# Patient Record
Sex: FEMALE | Race: WHITE | Employment: UNEMPLOYED | URBAN - METROPOLITAN AREA
[De-identification: names, ages, dates, MRNs, and addresses within clinical notes are randomized per-mention and may not be internally consistent; named-entity substitution may affect disease eponyms.]

---

## 2017-01-05 ENCOUNTER — CLINICAL SUPPORT (OUTPATIENT)
Dept: ONCOLOGY | Age: 37
End: 2017-01-05

## 2017-01-05 ENCOUNTER — HOSPITAL ENCOUNTER (OUTPATIENT)
Dept: ONCOLOGY | Age: 37
Discharge: HOME | End: 2017-01-05

## 2017-01-05 ENCOUNTER — HOSPITAL ENCOUNTER (OUTPATIENT)
Dept: INFUSION THERAPY | Age: 37
Discharge: HOME | End: 2017-01-05
Payer: COMMERCIAL

## 2017-01-05 VITALS
SYSTOLIC BLOOD PRESSURE: 128 MMHG | RESPIRATION RATE: 16 BRPM | TEMPERATURE: 98.9 F | HEART RATE: 87 BPM | DIASTOLIC BLOOD PRESSURE: 85 MMHG

## 2017-01-05 DIAGNOSIS — D75.1 SECONDARY POLYCYTHEMIA: ICD-10-CM

## 2017-01-05 DIAGNOSIS — D75.1 SECONDARY POLYCYTHEMIA: Primary | ICD-10-CM

## 2017-01-05 LAB
BASO+EOS+MONOS # BLD AUTO: 0.6 K/UL (ref 0–2.3)
BASO+EOS+MONOS # BLD AUTO: 11 % (ref 0.1–17)
DIFFERENTIAL METHOD BLD: NORMAL
ERYTHROCYTE [DISTWIDTH] IN BLOOD BY AUTOMATED COUNT: 12.1 % (ref 11.5–14.5)
HCT VFR BLD AUTO: 45.6 % (ref 36–48)
HGB BLD-MCNC: 15.3 G/DL (ref 12–16)
LYMPHOCYTES # BLD AUTO: 35 % (ref 14–44)
LYMPHOCYTES # BLD: 2 K/UL (ref 1.1–5.9)
MCH RBC QN AUTO: 31.3 PG (ref 25–35)
MCHC RBC AUTO-ENTMCNC: 33.6 G/DL (ref 31–37)
MCV RBC AUTO: 93.3 FL (ref 78–102)
NEUTS SEG # BLD: 3.1 K/UL (ref 1.8–9.5)
NEUTS SEG NFR BLD AUTO: 55 % (ref 40–70)
PLATELET # BLD AUTO: 178 K/UL (ref 140–440)
RBC # BLD AUTO: 4.89 M/UL (ref 4.1–5.1)
WBC # BLD AUTO: 5.7 K/UL (ref 4.5–13)

## 2017-01-05 PROCEDURE — 74011250636 HC RX REV CODE- 250/636: Performed by: INTERNAL MEDICINE

## 2017-01-05 PROCEDURE — 99195 PHLEBOTOMY: CPT

## 2017-01-05 PROCEDURE — 36415 COLL VENOUS BLD VENIPUNCTURE: CPT

## 2017-01-05 RX ORDER — SODIUM CHLORIDE 9 MG/ML
500 INJECTION, SOLUTION INTRAVENOUS CONTINUOUS
Status: DISPENSED | OUTPATIENT
Start: 2017-01-05 | End: 2017-01-06

## 2017-01-05 RX ADMIN — SODIUM CHLORIDE 500 ML: 0.9 INJECTION, SOLUTION INTRAVENOUS at 14:52

## 2017-01-05 NOTE — PROGRESS NOTES
[address] [facility] Progress Note    Date: 2017    Name: Chirag Ruiz    MRN: [id number]         : 1980      Ms. Luis Arriaga was assessed and education was provided. Ms. Christina Gonzáles vitals were reviewed and patient was observed for 5 minutes prior to treatment. Visit Vitals    /84 (BP 1 Location: Left arm, BP Patient Position: At rest;Sitting)    Pulse 86    Temp 97 °F (36.1 °C)    Resp 16    Breastfeeding No       Lab results were obtained and reviewed. Recent Results (from the past 12 hour(s))   CBC WITH 3 PART DIFF    Collection Time: 17  2:12 PM   Result Value Ref Range    WBC 5.7 4.5 - 13.0 K/uL    RBC 4.89 4. 10 - 5.10 M/uL    HGB 15.3 12.0 - 16.0 g/dL    HCT 45.6 36 - 48 %    MCV 93.3 78 - 102 FL    MCH 31.3 25.0 - 35.0 PG    MCHC 33.6 31 - 37 g/dL    RDW 12.1 11.5 - 14.5 %    PLATELET 402 960 - 324 K/uL    NEUTROPHILS 55 40 - 70 %    MIXED CELLS 11 0.1 - 17 %    LYMPHOCYTES 35 14 - 44 %    ABS. NEUTROPHILS 3.1 1.8 - 9.5 K/UL    ABS. MIXED CELLS 0.6 0.0 - 2.3 K/uL    ABS. LYMPHOCYTES 2.0 1.1 - 5.9 K/UL    DF AUTOMATED         Therapeutic phlebotomy started at 1430 and ended at 1452 for HCT 45.6. Approx 500 ml blood removed from patient and 500 ml NS given IV. Patient armband removed and shredded. Ms. Luis Arriaga was discharged from [place] [address] in stable condition at 1530. She is to return on 17 at 1300 for her next appointment for CBC/phlebotomy (weekly status).     Maria Gabriel RN  2017

## 2017-01-12 ENCOUNTER — HOSPITAL ENCOUNTER (OUTPATIENT)
Dept: INFUSION THERAPY | Age: 37
Discharge: HOME | End: 2017-01-12
Payer: COMMERCIAL

## 2017-01-12 ENCOUNTER — CLINICAL SUPPORT (OUTPATIENT)
Dept: ONCOLOGY | Age: 37
End: 2017-01-12

## 2017-01-12 ENCOUNTER — HOSPITAL ENCOUNTER (OUTPATIENT)
Dept: ONCOLOGY | Age: 37
Discharge: HOME | End: 2017-01-12

## 2017-01-12 VITALS
HEART RATE: 86 BPM | TEMPERATURE: 97.9 F | SYSTOLIC BLOOD PRESSURE: 117 MMHG | RESPIRATION RATE: 16 BRPM | DIASTOLIC BLOOD PRESSURE: 79 MMHG

## 2017-01-12 DIAGNOSIS — D75.1 POLYCYTHEMIA, SECONDARY: Primary | ICD-10-CM

## 2017-01-12 DIAGNOSIS — D75.1 POLYCYTHEMIA, SECONDARY: ICD-10-CM

## 2017-01-12 LAB
BASOPHILS # BLD AUTO: 0.1 K/UL (ref 0–0.1)
BASOPHILS # BLD: 1 % (ref 0–2)
DIFFERENTIAL METHOD BLD: NORMAL
EOSINOPHIL # BLD: 0.4 K/UL (ref 0–0.4)
EOSINOPHIL NFR BLD: 5 % (ref 0–5)
ERYTHROCYTE [DISTWIDTH] IN BLOOD BY AUTOMATED COUNT: 12.6 % (ref 11.6–14.5)
HCT VFR BLD AUTO: 39.7 % (ref 35–45)
HGB BLD-MCNC: 13.4 G/DL (ref 12–16)
LYMPHOCYTES # BLD AUTO: 27 % (ref 21–52)
LYMPHOCYTES # BLD: 1.9 K/UL (ref 0.9–3.6)
MCH RBC QN AUTO: 31.8 PG (ref 24–34)
MCHC RBC AUTO-ENTMCNC: 33.8 G/DL (ref 31–37)
MCV RBC AUTO: 94.3 FL (ref 74–97)
MONOCYTES # BLD: 0.5 K/UL (ref 0.05–1.2)
MONOCYTES NFR BLD AUTO: 7 % (ref 3–10)
NEUTS SEG # BLD: 4.1 K/UL (ref 1.8–8)
NEUTS SEG NFR BLD AUTO: 60 % (ref 40–73)
PLATELET # BLD AUTO: 199 K/UL (ref 135–420)
PMV BLD AUTO: 10.2 FL (ref 9.2–11.8)
RBC # BLD AUTO: 4.21 M/UL (ref 4.2–5.3)
WBC # BLD AUTO: 6.8 K/UL (ref 4.6–13.2)

## 2017-01-12 PROCEDURE — 36415 COLL VENOUS BLD VENIPUNCTURE: CPT

## 2017-01-12 NOTE — PROGRESS NOTES
[facility] Progress Note    Date: 2017    Name: Deon Frye    MRN: [id number]         : 1980      Ms. Srinath Gallardo was assessed and education was provided. Ms. Gui Avery vitals were reviewed and patient was observed for 5 minutes prior to treatment. Visit Vitals    /79 (BP 1 Location: Right arm, BP Patient Position: At rest;Sitting)    Pulse 86    Temp 97.9 °F (36.6 °C)    Resp 16       Lab results were obtained and reviewed. Recent Results (from the past 12 hour(s))   CBC WITH AUTOMATED DIFF    Collection Time: 17  1:21 PM   Result Value Ref Range    WBC 6.8 4.6 - 13.2 K/uL    RBC 4.21 4.20 - 5.30 M/uL    HGB 13.4 12.0 - 16.0 g/dL    HCT 39.7 35.0 - 45.0 %    MCV 94.3 74.0 - 97.0 FL    MCH 31.8 24.0 - 34.0 PG    MCHC 33.8 31.0 - 37.0 g/dL    RDW 12.6 11.6 - 14.5 %    PLATELET 217 717 - 095 K/uL    MPV 10.2 9.2 - 11.8 FL    NEUTROPHILS 60 40 - 73 %    LYMPHOCYTES 27 21 - 52 %    MONOCYTES 7 3 - 10 %    EOSINOPHILS 5 0 - 5 %    BASOPHILS 1 0 - 2 %    ABS. NEUTROPHILS 4.1 1.8 - 8.0 K/UL    ABS. LYMPHOCYTES 1.9 0.9 - 3.6 K/UL    ABS. MONOCYTES 0.5 0.05 - 1.2 K/UL    ABS. EOSINOPHILS 0.4 0.0 - 0.4 K/UL    ABS. BASOPHILS 0.1 0.0 - 0.1 K/UL    DF AUTOMATED         Therapeutic phlebotomyheld for HCT 39.7. Patient armband removed and shredded. Ms. Srinath Gallardo was discharged from [place] [address] in stable condition at 1402. She is to return on 17 at 1300 for her next appointment for CBC/phlebotomy (monthly status).     Dangelo Llanos RN  2017  3:16 PM

## 2017-02-01 ENCOUNTER — OFFICE VISIT (OUTPATIENT)
Dept: ORTHOPEDIC SURGERY | Facility: CLINIC | Age: 37
End: 2017-02-01

## 2017-02-01 VITALS
DIASTOLIC BLOOD PRESSURE: 83 MMHG | SYSTOLIC BLOOD PRESSURE: 129 MMHG | HEART RATE: 93 BPM | BODY MASS INDEX: 23.49 KG/M2 | TEMPERATURE: 97.4 F | WEIGHT: 155 LBS | HEIGHT: 68 IN

## 2017-02-01 DIAGNOSIS — M65.4 DE QUERVAIN'S DISEASE (TENOSYNOVITIS): ICD-10-CM

## 2017-02-01 DIAGNOSIS — M79.641 RIGHT HAND PAIN: Primary | ICD-10-CM

## 2017-02-01 RX ORDER — PREDNISONE 10 MG/1
TABLET ORAL
Qty: 20 TAB | Refills: 0 | Status: SHIPPED | OUTPATIENT
Start: 2017-02-01 | End: 2017-06-13 | Stop reason: ALTCHOICE

## 2017-02-01 NOTE — PROGRESS NOTES
Patient: Divya Alvarez                MRN: [id number]       SSN: [id number]  YOB: 1980        AGE: 43 y.o. SEX: female  There is no height or weight on file to calculate BMI. PCP: Micaela Ellis DO  02/01/17      No chief complaint on file. HISTORY OF PRESENT ILLNESS: Divya Alvarez is a 43 y.o. female who presents to the office for bilateral hand pain greater in the right. She works typing all day long. She denies any numbness or paresthesia but she is right hand dominant. Her main concern came from whether or not she had carpal tunnel. She has tried Motrin with no relief. Review of Systems   Constitutional: Negative. HENT: Negative. Eyes: Negative. Respiratory: Negative. Cardiovascular: Negative. Gastrointestinal: Negative. Genitourinary: Negative. Musculoskeletal: Positive for myalgias (bilateral hand). Skin: Negative. Neurological: Negative. Endo/Heme/Allergies: Negative. Psychiatric/Behavioral: Negative. Social History     Social History    Marital status:      Spouse name: N/A    Number of children: N/A    Years of education: N/A     Occupational History          Social History Main Topics    Smoking status: Current Every Day Smoker     Packs/day: 0.50     Years: 15.00     Types: Cigarettes    Smokeless tobacco: Never Used      Comment: 1998    Alcohol use 0.0 oz/week     1 - 4 Glasses of wine per week      Comment: occ    Drug use: No    Sexual activity: Yes     Partners: Male     Other Topics Concern    Not on file     Social History Narrative        Past Medical History   Diagnosis Date    Discoid lupus      skin lupus    Hypertension      during pregnancy/6 months after    Menorrhagia      hysterectomy    Polycythemia (HCC)         No Known Allergies      Current Outpatient Prescriptions   Medication Sig    montelukast (SINGULAIR) 10 mg tablet Take 10 mg by mouth daily.     ALPRAZolam (XANAX) 0.25 mg tablet Take 1 Tab by mouth daily as needed for Anxiety.  escitalopram oxalate (LEXAPRO) 10 mg tablet Take 1 Tab by mouth daily.  hyoscyamine sulfate (CYSTOSPAZ) 0.125 mg tablet 0.125 mg every four (4) hours as needed.  naproxen (NAPROSYN) 500 mg tablet Take 1 Tab by mouth two (2) times daily (with meals).  fexofenadine (ALLEGRA) 180 mg tablet Take 1 Tab by mouth daily. No current facility-administered medications for this visit. Physical Exam  Constitutional: she is oriented to person, place, and time and well-developed, well-nourished, and in no distress. No distress. HENT:   Head: Normocephalic and atraumatic. Right Ear: Hearing normal.   Left Ear: Hearing normal.   Nose: Nose normal.   Eyes: Conjunctivae, EOM and lids are normal. Pupils are equal, round, and reactive to light. Neck: Trachea normal.   Pulmonary/Chest: Effort normal and breath sounds normal. No respiratory distress. Abdominal: Soft. Neurological: she is alert and oriented to person, place, and time. Skin: Skin is warm, dry and intact. she is not diaphoretic. Psychiatric: Affect normal.   Nursing note and vitals reviewed. Ortho Exam   Majority of her pain was along the course of the thumb extensor tendons. She had a positive finkelstein's test. She had no TTP over the MCP joint of the thumb. There was no swelling or skin discoloration at any part of the left or right hands.  strength was normal and pain free of both hands. Sensation and circulation were normal. There was a negative Tinel sign when tapping over the right wrist.     RADIOGRAPHS: X-rays show 3 views show no arthritis or bony abnormalities. 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 She was fitted with a splint and give na modified medrol satnam and Aleve 2 times a day and apply moist heat 2 times daily. She will call us with an update on her response.        Scribed by [facility] ([facility]) as dictated by Giles Acosta MD.

## 2017-02-09 ENCOUNTER — HOSPITAL ENCOUNTER (OUTPATIENT)
Dept: ONCOLOGY | Age: 37
Discharge: HOME | End: 2017-02-09

## 2017-02-09 ENCOUNTER — CLINICAL SUPPORT (OUTPATIENT)
Dept: ONCOLOGY | Age: 37
End: 2017-02-09

## 2017-02-09 ENCOUNTER — HOSPITAL ENCOUNTER (OUTPATIENT)
Dept: INFUSION THERAPY | Age: 37
Discharge: HOME | End: 2017-02-09
Payer: COMMERCIAL

## 2017-02-09 VITALS
TEMPERATURE: 96.6 F | RESPIRATION RATE: 16 BRPM | HEART RATE: 86 BPM | SYSTOLIC BLOOD PRESSURE: 120 MMHG | DIASTOLIC BLOOD PRESSURE: 80 MMHG

## 2017-02-09 DIAGNOSIS — D75.1 SECONDARY POLYCYTHEMIA: Primary | ICD-10-CM

## 2017-02-09 DIAGNOSIS — D75.1 SECONDARY POLYCYTHEMIA: ICD-10-CM

## 2017-02-09 LAB
BASO+EOS+MONOS # BLD AUTO: 0.8 K/UL (ref 0–2.3)
BASO+EOS+MONOS # BLD AUTO: 13 % (ref 0.1–17)
DIFFERENTIAL METHOD BLD: NORMAL
ERYTHROCYTE [DISTWIDTH] IN BLOOD BY AUTOMATED COUNT: 11.9 % (ref 11.5–14.5)
HCT VFR BLD AUTO: 42.4 % (ref 36–48)
HGB BLD-MCNC: 14.3 G/DL (ref 12–16)
LYMPHOCYTES # BLD AUTO: 31 % (ref 14–44)
LYMPHOCYTES # BLD: 1.9 K/UL (ref 1.1–5.9)
MCH RBC QN AUTO: 31.2 PG (ref 25–35)
MCHC RBC AUTO-ENTMCNC: 33.7 G/DL (ref 31–37)
MCV RBC AUTO: 92.4 FL (ref 78–102)
NEUTS SEG # BLD: 3.6 K/UL (ref 1.8–9.5)
NEUTS SEG NFR BLD AUTO: 56 % (ref 40–70)
PLATELET # BLD AUTO: 204 K/UL (ref 140–440)
RBC # BLD AUTO: 4.59 M/UL (ref 4.1–5.1)
WBC # BLD AUTO: 6.3 K/UL (ref 4.5–13)

## 2017-02-09 PROCEDURE — 36415 COLL VENOUS BLD VENIPUNCTURE: CPT

## 2017-02-09 NOTE — PROGRESS NOTES
[facility] Progress Note    Date: 2017    Name: May Anton    MRN: [id number]         : 1980      Ms. Judith Oh was assessed and education was provided. Ms. Franchesca Rodríguez vitals were reviewed and patient was observed for 5 minutes prior to treatment. Visit Vitals    /80 (BP 1 Location: Right arm, BP Patient Position: At rest;Sitting)    Pulse 86    Temp 96.6 °F (35.9 °C)    Resp 16    Breastfeeding No       Lab results were obtained and reviewed. Recent Results (from the past 12 hour(s))   CBC WITH 3 PART DIFF    Collection Time: 17  1:12 PM   Result Value Ref Range    WBC 6.3 4.5 - 13.0 K/uL    RBC 4.59 4. 10 - 5.10 M/uL    HGB 14.3 12.0 - 16.0 g/dL    HCT 42.4 36 - 48 %    MCV 92.4 78 - 102 FL    MCH 31.2 25.0 - 35.0 PG    MCHC 33.7 31 - 37 g/dL    RDW 11.9 11.5 - 14.5 %    PLATELET 134 254 - 917 K/uL    NEUTROPHILS 56 40 - 70 %    MIXED CELLS 13 0.1 - 17 %    LYMPHOCYTES 31 14 - 44 %    ABS. NEUTROPHILS 3.6 1.8 - 9.5 K/UL    ABS. MIXED CELLS 0.8 0.0 - 2.3 K/uL    ABS. LYMPHOCYTES 1.9 1.1 - 5.9 K/UL    DF AUTOMATED         Therapeutic phlebotomy held for HCT 42.4. Patient armband removed and shredded. Ms. Judith Oh was discharged from [place] [address] in stable condition at 1335. She is to return on 3/9/17 at 1300 for her next appointment for CBC/phlebotomy (monthly).     Riri Saab RN  2017  1:55 PM

## 2017-03-09 ENCOUNTER — HOSPITAL ENCOUNTER (OUTPATIENT)
Dept: LAB | Age: 37
Discharge: HOME | End: 2017-03-09
Payer: COMMERCIAL

## 2017-03-09 ENCOUNTER — CLINICAL SUPPORT (OUTPATIENT)
Dept: ONCOLOGY | Age: 37
End: 2017-03-09

## 2017-03-09 ENCOUNTER — HOSPITAL ENCOUNTER (OUTPATIENT)
Dept: ONCOLOGY | Age: 37
Discharge: HOME | End: 2017-03-09

## 2017-03-09 ENCOUNTER — HOSPITAL ENCOUNTER (OUTPATIENT)
Dept: INFUSION THERAPY | Age: 37
Discharge: HOME | End: 2017-03-09
Payer: COMMERCIAL

## 2017-03-09 ENCOUNTER — OFFICE VISIT (OUTPATIENT)
Dept: FAMILY MEDICINE CLINIC | Age: 37
End: 2017-03-09

## 2017-03-09 VITALS
HEART RATE: 90 BPM | TEMPERATURE: 98.5 F | HEIGHT: 68 IN | OXYGEN SATURATION: 99 % | SYSTOLIC BLOOD PRESSURE: 125 MMHG | BODY MASS INDEX: 25.58 KG/M2 | WEIGHT: 168.8 LBS | RESPIRATION RATE: 18 BRPM | DIASTOLIC BLOOD PRESSURE: 86 MMHG

## 2017-03-09 VITALS
RESPIRATION RATE: 16 BRPM | DIASTOLIC BLOOD PRESSURE: 77 MMHG | HEART RATE: 90 BPM | SYSTOLIC BLOOD PRESSURE: 125 MMHG | TEMPERATURE: 97.3 F

## 2017-03-09 DIAGNOSIS — F41.9 ANXIETY: Primary | ICD-10-CM

## 2017-03-09 DIAGNOSIS — D75.1 SECONDARY POLYCYTHEMIA: ICD-10-CM

## 2017-03-09 DIAGNOSIS — Z13.220 SCREENING FOR LIPID DISORDERS: ICD-10-CM

## 2017-03-09 DIAGNOSIS — R53.83 FATIGUE, UNSPECIFIED TYPE: ICD-10-CM

## 2017-03-09 DIAGNOSIS — F41.9 ANXIETY: ICD-10-CM

## 2017-03-09 DIAGNOSIS — D75.1 SECONDARY POLYCYTHEMIA: Primary | ICD-10-CM

## 2017-03-09 LAB
BASO+EOS+MONOS # BLD AUTO: 0.6 K/UL (ref 0–2.3)
BASO+EOS+MONOS # BLD AUTO: 10 % (ref 0.1–17)
DIFFERENTIAL METHOD BLD: NORMAL
ERYTHROCYTE [DISTWIDTH] IN BLOOD BY AUTOMATED COUNT: 11.6 % (ref 11.5–14.5)
HCT VFR BLD AUTO: 43.7 % (ref 36–48)
HGB BLD-MCNC: 14.8 G/DL (ref 12–16)
LYMPHOCYTES # BLD AUTO: 28 % (ref 14–44)
LYMPHOCYTES # BLD: 1.7 K/UL (ref 1.1–5.9)
MCH RBC QN AUTO: 30.6 PG (ref 25–35)
MCHC RBC AUTO-ENTMCNC: 33.9 G/DL (ref 31–37)
MCV RBC AUTO: 90.5 FL (ref 78–102)
NEUTS SEG # BLD: 3.7 K/UL (ref 1.8–9.5)
NEUTS SEG NFR BLD AUTO: 63 % (ref 40–70)
PLATELET # BLD AUTO: 191 K/UL (ref 140–440)
RBC # BLD AUTO: 4.83 M/UL (ref 4.1–5.1)
WBC # BLD AUTO: 6 K/UL (ref 4.5–13)

## 2017-03-09 PROCEDURE — 80307 DRUG TEST PRSMV CHEM ANLYZR: CPT | Performed by: PHYSICIAN ASSISTANT

## 2017-03-09 PROCEDURE — 36415 COLL VENOUS BLD VENIPUNCTURE: CPT

## 2017-03-09 RX ORDER — ALPRAZOLAM 0.5 MG/1
TABLET ORAL
Qty: 30 TAB | Refills: 0 | Status: SHIPPED | OUTPATIENT
Start: 2017-03-09 | End: 2017-10-26 | Stop reason: SDUPTHER

## 2017-03-09 RX ORDER — DICYCLOMINE HYDROCHLORIDE 20 MG/1
20 TABLET ORAL
COMMUNITY
End: 2019-07-26

## 2017-03-09 NOTE — PROGRESS NOTES
[facility] Progress Note    Date: 2017    Name: Diana Gamez    MRN: [id number]         : 1980      Ms. Andrea Garcias was assessed and education was provided. Ms. Jeanne Leslie vitals were reviewed and patient was observed for 5 minutes prior to treatment. Visit Vitals    /77 (BP 1 Location: Left arm, BP Patient Position: At rest;Sitting)    Pulse 90    Temp 97.3 °F (36.3 °C)    Resp 16    Breastfeeding No       Lab results were obtained and reviewed. Recent Results (from the past 12 hour(s))   CBC WITH 3 PART DIFF    Collection Time: 17  1:14 PM   Result Value Ref Range    WBC 6.0 4.5 - 13.0 K/uL    RBC 4.83 4.10 - 5.10 M/uL    HGB 14.8 12.0 - 16.0 g/dL    HCT 43.7 36 - 48 %    MCV 90.5 78 - 102 FL    MCH 30.6 25.0 - 35.0 PG    MCHC 33.9 31 - 37 g/dL    RDW 11.6 11.5 - 14.5 %    PLATELET 471 915 - 056 K/uL    NEUTROPHILS 63 40 - 70 %    MIXED CELLS 10 0.1 - 17 %    LYMPHOCYTES 28 14 - 44 %    ABS. NEUTROPHILS 3.7 1.8 - 9.5 K/UL    ABS. MIXED CELLS 0.6 0.0 - 2.3 K/uL    ABS. LYMPHOCYTES 1.7 1.1 - 5.9 K/UL    DF AUTOMATED         Phlebotomy held for HCT 43.7. Patient armband removed and shredded. Ms. Andrea Garcias was discharged from [place] [address] in stable condition at 1336. She is to return on 17 at 1300 for her next appointment for CBC/phlebotomy (monthly status).     Mayra Carrillo RN  2017  1:51 PM

## 2017-03-09 NOTE — PROGRESS NOTES
Patient: Shavon Rice MRN: [id number]  SSN: [id number]    YOB: 1980  Age: 43 y.o. Sex: female      Date of Service: 3/9/2017   Provider: MARKUS Garcia         REASON FOR VISIT:   Chief Complaint   Patient presents with    Anxiety     follow up, lexapro she doesn't like the way it makes her filled  so she stopped it    Abdominal Pain     on going issue has IBS, dairy upsets her     Establish Care        VITALS:   Visit Vitals    /86 (BP 1 Location: Right arm, BP Patient Position: Sitting)    Pulse 90    Temp 98.5 °F (36.9 °C) (Oral)    Resp 18    Ht 5' 8\" (1.727 m)    Wt 168 lb 12.8 oz (76.6 kg)    LMP 10/29/2014    SpO2 99%    BMI 25.67 kg/m2       MEDICATIONS:   Current Outpatient Prescriptions on File Prior to Visit   Medication Sig Dispense Refill    montelukast (SINGULAIR) 10 mg tablet Take 10 mg by mouth daily.  ALPRAZolam (XANAX) 0.25 mg tablet Take 1 Tab by mouth daily as needed for Anxiety. 10 Tab 0    naproxen (NAPROSYN) 500 mg tablet Take 1 Tab by mouth two (2) times daily (with meals). 60 Tab 3    fexofenadine (ALLEGRA) 180 mg tablet Take 1 Tab by mouth daily. 90 Tab 3    predniSONE (DELTASONE) 10 mg tablet Day 1: Take 4 tablets  Day 2,3,4: Take 3 tablets per day  Day 5,6,7: Take 2 tablets per day  Day 8: Take 1 tablet    Total: 20 tablets 20 Tab 0    escitalopram oxalate (LEXAPRO) 10 mg tablet Take 1 Tab by mouth daily. 30 Tab 1    hyoscyamine sulfate (CYSTOSPAZ) 0.125 mg tablet 0.125 mg every four (4) hours as needed. No current facility-administered medications on file prior to visit.          ALLERGIES:   No Known Allergies     ACTIVE MEDICAL PROBLEMS:  Patient Active Problem List   Diagnosis Code    Difficulty concentrating R41.840    Tobacco abuse Z72.0    Tobacco abuse counseling Z71.6    Fatigue R53.83    Allergic rhinitis, seasonal J30.2    Polycythemia ([address] D75.1    Adjustment disorder with mixed anxiety and depressed mood F43.23        MEDICAL/SURGICAL HISTORY:  Past Medical History:   Diagnosis Date    Discoid lupus     skin lupus    Hypertension     during pregnancy/6 months after    IBS (irritable bowel syndrome) 2016    Menorrhagia     hysterectomy    Polycythemia ([address]       Past Surgical History:   Procedure Laterality Date    DELIVERY       HX DILATION AND CURETTAGE      HX GYN      laproscopy    HX HYSTERECTOMY          FAMILY HISTORY:  Family History   Problem Relation Age of Onset    Alcohol abuse Mother     Drug Abuse Mother    [employer] Arthritis-rheumatoid Mother     Hypertension Father     Hypertension Sister     High Cholesterol Sister     Dementia Sister     Stroke Sister     Arthritis-rheumatoid Maternal Grandmother     Alcohol abuse Maternal Grandfather     Arthritis-rheumatoid Maternal Grandfather     Other Maternal Grandfather      ruptured bowel    Stroke Sister         SOCIAL HISTORY:  Social History   Substance Use Topics    Smoking status: Current Every Day Smoker     Packs/day: 0.50     Years: 15.00     Types: Cigarettes    Smokeless tobacco: Never Used      Comment:     Alcohol use 0.0 oz/week     1 - 4 Glasses of wine per week      Comment: occ             HISTORY OF PRESENT ILLNESS: Adrian Dougherty is a 43 y.o. female who presents to the office for a routine follow up visit. Anxiety -   Patient was started on Lexapro at her last visit, but admits she stopped the medication after one dose due to an adverse reaction. She describes that her pupils became dilated and she felt very anxious after taking the medication. Anxiety has overall been stable. Patient reports she typically is able to manage her anxiety, but when symptoms become severe, she uses Xanax . 0.25 mg. Has been using Xanax infrequently - once a week or less - however she notes that she generally has to take 2 to achieve adequate relief of symptoms. She is requesting a prescription for 0.5 mg.    Patient is nervous about trying another daily medication, but states she is now open minded to considering therapy as well for management of anxiety. IBS -   Diagnosed by GI last year following an extensive workup precipitated by an acute episode of diarrhea that landed her in the emergency room. Patient reports she has been having issues with intermittent abdominal cramping and diarrhea ever since. She uses Bentyl as needed, but tries to use this sparingly as it makes her tired. She also wonders about possible lactose intolerance, as she has noticed an association of her symptoms with eating cheese. She has not yet tried any dietary modifications     REVIEW OF SYSTEMS:  Review of Systems   Constitutional: Positive for malaise/fatigue. Negative for chills and fever. Respiratory: Negative for shortness of breath. Cardiovascular: Negative for chest pain. Gastrointestinal: Positive for abdominal pain and diarrhea. Negative for blood in stool, constipation, nausea and vomiting. Psychiatric/Behavioral: Negative for depression and substance abuse. The patient is nervous/anxious. PHYSICAL EXAMINATION:  Physical Exam not performed. Counseling visit only. RESULTS:  No results found for this visit on 03/09/17. ASSESSMENT/PLAN:  Sam Lopez was seen today for anxiety, abdominal pain and establish care. Diagnoses and all orders for this visit:    Anxiety  - Have agreed to prescribe patient's Xanax provided that she continues to use it properly (sparingly and only as needed for severe symptoms)  - Controlled med agreement signed today, sent to be scanned into chart  -  reviewed  Orders:  -     ALPRAZolam (XANAX) 0.5 mg tablet; Take 1/2 to 1 tab daily as needed for anxiety MDD: 1 tab  -     TSH 3RD GENERATION; Future  -     COMPLIANCE DRUG SCREEN/PRESCRIPTION MONITORING;  Future    Fatigue, unspecified type  - Check routine labs as below to rule out underlying causes of fatigue and GI upset   -     TSH 3RD GENERATION; Future  -     VITAMIN D, 25 HYDROXY; Future  -     METABOLIC PANEL, COMPREHENSIVE; Future    Screening for lipid disorders  -     LIPID PANEL; Future    Follow up in 3 months or sooner as needed  No medication refills without visit    Patient expresses understanding and is agreeable with the above plan. A total of 20 minutes was spent with the patient, of which >50% was spent in counseling/coordinating care regarding anxiety and IBS symptoms and management.         MARKUS Gay   March 9, 2017    1:09 PM

## 2017-03-10 ENCOUNTER — HOSPITAL ENCOUNTER (OUTPATIENT)
Dept: LAB | Age: 37
Discharge: HOME | End: 2017-03-10
Payer: COMMERCIAL

## 2017-03-10 DIAGNOSIS — R53.83 FATIGUE, UNSPECIFIED TYPE: ICD-10-CM

## 2017-03-10 DIAGNOSIS — Z13.220 SCREENING FOR LIPID DISORDERS: ICD-10-CM

## 2017-03-10 DIAGNOSIS — F41.9 ANXIETY: ICD-10-CM

## 2017-03-10 LAB
25(OH)D3 SERPL-MCNC: 13.3 NG/ML (ref 30–100)
ALBUMIN SERPL BCP-MCNC: 4 G/DL (ref 3.4–5)
ALBUMIN/GLOB SERPL: 1.2 {RATIO} (ref 0.8–1.7)
ALP SERPL-CCNC: 78 U/L (ref 45–117)
ALT SERPL-CCNC: 18 U/L (ref 13–56)
ANION GAP BLD CALC-SCNC: 9 MMOL/L (ref 3–18)
AST SERPL W P-5'-P-CCNC: 18 U/L (ref 15–37)
BILIRUB SERPL-MCNC: 0.4 MG/DL (ref 0.2–1)
BUN SERPL-MCNC: 8 MG/DL (ref 7–18)
BUN/CREAT SERPL: 11 (ref 12–20)
CALCIUM SERPL-MCNC: 8.9 MG/DL (ref 8.5–10.1)
CHLORIDE SERPL-SCNC: 102 MMOL/L (ref 100–108)
CHOLEST SERPL-MCNC: 224 MG/DL
CO2 SERPL-SCNC: 26 MMOL/L (ref 21–32)
CREAT SERPL-MCNC: 0.72 MG/DL (ref 0.6–1.3)
GLOBULIN SER CALC-MCNC: 3.3 G/DL (ref 2–4)
GLUCOSE SERPL-MCNC: 68 MG/DL (ref 74–99)
HDLC SERPL-MCNC: 122 MG/DL (ref 40–60)
HDLC SERPL: 1.8 {RATIO} (ref 0–5)
LDLC SERPL CALC-MCNC: 92.8 MG/DL (ref 0–100)
LIPID PROFILE,FLP: ABNORMAL
POTASSIUM SERPL-SCNC: 5 MMOL/L (ref 3.5–5.5)
PROT SERPL-MCNC: 7.3 G/DL (ref 6.4–8.2)
SODIUM SERPL-SCNC: 137 MMOL/L (ref 136–145)
TRIGL SERPL-MCNC: 46 MG/DL (ref ?–150)
TSH SERPL DL<=0.05 MIU/L-ACNC: 2.5 UIU/ML (ref 0.36–3.74)
VLDLC SERPL CALC-MCNC: 9.2 MG/DL

## 2017-03-10 PROCEDURE — 36415 COLL VENOUS BLD VENIPUNCTURE: CPT | Performed by: PHYSICIAN ASSISTANT

## 2017-03-10 PROCEDURE — 80061 LIPID PANEL: CPT | Performed by: PHYSICIAN ASSISTANT

## 2017-03-10 PROCEDURE — 82306 VITAMIN D 25 HYDROXY: CPT | Performed by: PHYSICIAN ASSISTANT

## 2017-03-10 PROCEDURE — 84443 ASSAY THYROID STIM HORMONE: CPT | Performed by: PHYSICIAN ASSISTANT

## 2017-03-10 PROCEDURE — 80053 COMPREHEN METABOLIC PANEL: CPT | Performed by: PHYSICIAN ASSISTANT

## 2017-03-13 ENCOUNTER — TELEPHONE (OUTPATIENT)
Dept: FAMILY MEDICINE CLINIC | Age: 37
End: 2017-03-13

## 2017-03-13 RX ORDER — ERGOCALCIFEROL 1.25 MG/1
50000 CAPSULE ORAL
Qty: 4 CAP | Refills: 2 | Status: SHIPPED | OUTPATIENT
Start: 2017-03-13 | End: 2017-04-11 | Stop reason: SDUPTHER

## 2017-03-13 NOTE — TELEPHONE ENCOUNTER
Please call patient to notify her that labs were all normal, with the exception of her Vitamin D level. I will send rx for Vitamin D 50,000 units once weekly to her pharmacy. We can repeat this test at her next office visit. thanks!

## 2017-03-13 NOTE — TELEPHONE ENCOUNTER
Call made to Pt using two identifiers. Pt made aware that her labs were normal with the exception of the Vit D, and that the provider called in Vit D 50,000 units weekly to her Pharmacy and she verbalized understanding.

## 2017-03-15 LAB
DRUGS UR: NORMAL
PDF, 451356: NORMAL

## 2017-04-11 RX ORDER — ERGOCALCIFEROL 1.25 MG/1
50000 CAPSULE ORAL
Qty: 4 CAP | Refills: 2 | Status: SHIPPED | OUTPATIENT
Start: 2017-04-11 | End: 2018-09-13

## 2017-04-13 ENCOUNTER — CLINICAL SUPPORT (OUTPATIENT)
Dept: ONCOLOGY | Age: 37
End: 2017-04-13

## 2017-04-13 ENCOUNTER — HOSPITAL ENCOUNTER (OUTPATIENT)
Dept: ONCOLOGY | Age: 37
Discharge: HOME | End: 2017-04-13

## 2017-04-13 ENCOUNTER — HOSPITAL ENCOUNTER (OUTPATIENT)
Dept: INFUSION THERAPY | Age: 37
Discharge: HOME | End: 2017-04-13
Payer: COMMERCIAL

## 2017-04-13 VITALS
SYSTOLIC BLOOD PRESSURE: 138 MMHG | RESPIRATION RATE: 16 BRPM | DIASTOLIC BLOOD PRESSURE: 77 MMHG | TEMPERATURE: 97.6 F | HEART RATE: 92 BPM

## 2017-04-13 DIAGNOSIS — D75.1 POLYCYTHEMIA, SECONDARY: Primary | ICD-10-CM

## 2017-04-13 DIAGNOSIS — D75.1 POLYCYTHEMIA, SECONDARY: ICD-10-CM

## 2017-04-13 LAB
BASO+EOS+MONOS # BLD AUTO: 0.8 K/UL (ref 0–2.3)
BASO+EOS+MONOS # BLD AUTO: 14 % (ref 0.1–17)
DIFFERENTIAL METHOD BLD: NORMAL
ERYTHROCYTE [DISTWIDTH] IN BLOOD BY AUTOMATED COUNT: 12.1 % (ref 11.5–14.5)
HCT VFR BLD AUTO: 44.2 % (ref 36–48)
HGB BLD-MCNC: 15 G/DL (ref 12–16)
LYMPHOCYTES # BLD AUTO: 32 % (ref 14–44)
LYMPHOCYTES # BLD: 1.9 K/UL (ref 1.1–5.9)
MCH RBC QN AUTO: 31.1 PG (ref 25–35)
MCHC RBC AUTO-ENTMCNC: 33.9 G/DL (ref 31–37)
MCV RBC AUTO: 91.5 FL (ref 78–102)
NEUTS SEG # BLD: 3.3 K/UL (ref 1.8–9.5)
NEUTS SEG NFR BLD AUTO: 54 % (ref 40–70)
PLATELET # BLD AUTO: 180 K/UL (ref 140–440)
RBC # BLD AUTO: 4.83 M/UL (ref 4.1–5.1)
WBC # BLD AUTO: 6 K/UL (ref 4.5–13)

## 2017-04-13 PROCEDURE — 36415 COLL VENOUS BLD VENIPUNCTURE: CPT

## 2017-05-18 ENCOUNTER — HOSPITAL ENCOUNTER (OUTPATIENT)
Dept: ONCOLOGY | Age: 37
Discharge: HOME | End: 2017-05-18

## 2017-05-18 ENCOUNTER — HOSPITAL ENCOUNTER (OUTPATIENT)
Dept: INFUSION THERAPY | Age: 37
Discharge: HOME | End: 2017-05-18
Payer: COMMERCIAL

## 2017-05-18 ENCOUNTER — CLINICAL SUPPORT (OUTPATIENT)
Dept: ONCOLOGY | Age: 37
End: 2017-05-18

## 2017-05-18 VITALS
DIASTOLIC BLOOD PRESSURE: 83 MMHG | HEART RATE: 93 BPM | SYSTOLIC BLOOD PRESSURE: 130 MMHG | RESPIRATION RATE: 16 BRPM | TEMPERATURE: 98.1 F

## 2017-05-18 DIAGNOSIS — D75.1 POLYCYTHEMIA, SECONDARY: Primary | ICD-10-CM

## 2017-05-18 DIAGNOSIS — D75.1 POLYCYTHEMIA, SECONDARY: ICD-10-CM

## 2017-05-18 LAB
BASO+EOS+MONOS # BLD AUTO: 0.8 K/UL (ref 0–2.3)
BASO+EOS+MONOS # BLD AUTO: 12 % (ref 0.1–17)
DIFFERENTIAL METHOD BLD: NORMAL
ERYTHROCYTE [DISTWIDTH] IN BLOOD BY AUTOMATED COUNT: 12.2 % (ref 11.5–14.5)
HCT VFR BLD AUTO: 41.7 % (ref 36–48)
HGB BLD-MCNC: 14.5 G/DL (ref 12–16)
LYMPHOCYTES # BLD AUTO: 29 % (ref 14–44)
LYMPHOCYTES # BLD: 1.8 K/UL (ref 1.1–5.9)
MCH RBC QN AUTO: 32.5 PG (ref 25–35)
MCHC RBC AUTO-ENTMCNC: 34.8 G/DL (ref 31–37)
MCV RBC AUTO: 93.5 FL (ref 78–102)
NEUTS SEG # BLD: 3.8 K/UL (ref 1.8–9.5)
NEUTS SEG NFR BLD AUTO: 59 % (ref 40–70)
PLATELET # BLD AUTO: 161 K/UL (ref 140–440)
RBC # BLD AUTO: 4.46 M/UL (ref 4.1–5.1)
WBC # BLD AUTO: 6.4 K/UL (ref 4.5–13)

## 2017-05-18 PROCEDURE — 36415 COLL VENOUS BLD VENIPUNCTURE: CPT

## 2017-05-18 NOTE — PROGRESS NOTES
[facility] Progress Note    Date: May 18, 2017    Name: Felisha Taylor    MRN: [id number]         : 1980      Ms. Kang Jaeger arrived in the [facility] today, at , in stable condition, here for CBC/Phlebotomy. She was assessed and education was provided. Ms. Leyva Feeling vitals were reviewed. Visit Vitals    /83 (BP 1 Location: Left arm, BP Patient Position: At rest;Sitting)    Pulse 93    Temp 98.1 °F (36.7 °C)    Resp 16    Breastfeeding No           CBC was drawn from her left AC, at 1518, without incident. Lab results were obtained and reviewed. Recent Results (from the past 12 hour(s))   CBC WITH 3 PART DIFF    Collection Time: 17  3:18 PM   Result Value Ref Range    WBC 6.4 4.5 - 13.0 K/uL    RBC 4.46 4.10 - 5.10 M/uL    HGB 14.5 12.0 - 16.0 g/dL    HCT 41.7 36 - 48 %    MCV 93.5 78 - 102 FL    MCH 32.5 25.0 - 35.0 PG    MCHC 34.8 31 - 37 g/dL    RDW 12.2 11.5 - 14.5 %    PLATELET 888 014 - 840 K/uL    NEUTROPHILS 59 40 - 70 %    MIXED CELLS 12 0.1 - 17 %    LYMPHOCYTES 29 14 - 44 %    ABS. NEUTROPHILS 3.8 1.8 - 9.5 K/UL    ABS. MIXED CELLS 0.8 0.0 - 2.3 K/uL    ABS. LYMPHOCYTES 1.8 1.1 - 5.9 K/UL    DF AUTOMATED               Phlebotomy was HELD today, per order, for HCT < 45.0. Ms. Kang Jaeger tolerated well, and had no complaints. Ms. Kang Jaeger was discharged from [place] [address] in stable condition at 1530. Laurita Kim She is to return in 1 month, on Thursday, 6-15-17, at 1300,  for her next appointment, for CBC/Phlebotomy.      Kat Mast RN  May 18, 2017  3:26 PM

## 2017-06-13 ENCOUNTER — OFFICE VISIT (OUTPATIENT)
Dept: FAMILY MEDICINE CLINIC | Age: 37
End: 2017-06-13

## 2017-06-13 VITALS
SYSTOLIC BLOOD PRESSURE: 134 MMHG | TEMPERATURE: 98.1 F | HEIGHT: 68 IN | WEIGHT: 168 LBS | RESPIRATION RATE: 18 BRPM | BODY MASS INDEX: 25.46 KG/M2 | OXYGEN SATURATION: 98 % | HEART RATE: 90 BPM | DIASTOLIC BLOOD PRESSURE: 80 MMHG

## 2017-06-13 DIAGNOSIS — F41.9 ANXIETY: Primary | ICD-10-CM

## 2017-06-13 DIAGNOSIS — E55.9 VITAMIN D DEFICIENCY: ICD-10-CM

## 2017-06-13 RX ORDER — ALPRAZOLAM 0.5 MG/1
TABLET ORAL
Qty: 30 TAB | Refills: 0 | Status: CANCELLED | OUTPATIENT
Start: 2017-06-13

## 2017-06-13 NOTE — PROGRESS NOTES
Patient: Aretha Robbins MRN: [id number]  SSN: [id number]    YOB: 1980  Age: 42 y.o. Sex: female      Date of Service: 6/13/2017   Provider: MARKUS Fernandez         REASON FOR VISIT:   Chief Complaint   Patient presents with    Follow-up     pt presents for follow up for Anxiety  pt is currently taking Sallyann Blonder which was prescribe by Dermatology yesterday pt has no concerns today     Medication Refill     Xanax         VITALS:   Visit Vitals    /80    Pulse 90    Temp 98.1 °F (36.7 °C) (Oral)    Resp 18    Ht 5' 8\" (1.727 m)    Wt 168 lb (76.2 kg)    LMP 10/29/2014    SpO2 98%    BMI 25.54 kg/m2       MEDICATIONS:   Current Outpatient Prescriptions on File Prior to Visit   Medication Sig Dispense Refill    ergocalciferol (VITAMIN D2) 50,000 unit capsule Take 1 Cap by mouth every seven (7) days. 4 Cap 2    dicyclomine (BENTYL) 20 mg tablet Take 20 mg by mouth every six (6) hours.  ALPRAZolam (XANAX) 0.5 mg tablet Take 1/2 to 1 tab daily as needed for anxiety MDD: 1 tab 30 Tab 0    montelukast (SINGULAIR) 10 mg tablet Take 10 mg by mouth daily.  naproxen (NAPROSYN) 500 mg tablet Take 1 Tab by mouth two (2) times daily (with meals). 60 Tab 3    fexofenadine (ALLEGRA) 180 mg tablet Take 1 Tab by mouth daily. 90 Tab 3    escitalopram oxalate (LEXAPRO) 10 mg tablet Take 1 Tab by mouth daily. 30 Tab 1     No current facility-administered medications on file prior to visit.          ALLERGIES:   No Known Allergies     ACTIVE MEDICAL PROBLEMS:  Patient Active Problem List   Diagnosis Code    Difficulty concentrating R41.840    Tobacco abuse Z72.0    Tobacco abuse counseling Z71.6    Fatigue R53.83    Allergic rhinitis, seasonal J30.2    Polycythemia ([address] D75.1    Adjustment disorder with mixed anxiety and depressed mood F43.23        MEDICAL/SURGICAL HISTORY:  Past Medical History:   Diagnosis Date    Discoid lupus     skin lupus    Hypertension     during pregnancy/6 months after    IBS (irritable bowel syndrome) 2016    Menorrhagia     hysterectomy    Polycythemia ([address]       Past Surgical History:   Procedure Laterality Date    DELIVERY       HX DILATION AND CURETTAGE      HX GYN      laproscopy    HX HYSTERECTOMY          FAMILY HISTORY:  Family History   Problem Relation Age of Onset    Alcohol abuse Mother     Drug Abuse Mother    [facility] Arthritis-rheumatoid Mother     Hypertension Father     Hypertension Sister     High Cholesterol Sister     Dementia Sister     Stroke Sister     Arthritis-rheumatoid Maternal Grandmother     Alcohol abuse Maternal Grandfather     Arthritis-rheumatoid Maternal Grandfather     Other Maternal Grandfather      ruptured bowel    Stroke Sister         SOCIAL HISTORY:  Social History   Substance Use Topics    Smoking status: Current Every Day Smoker     Packs/day: 0.50     Years: 15.00     Types: Cigarettes    Smokeless tobacco: Never Used      Comment:     Alcohol use 0.0 oz/week     1 - 4 Glasses of wine per week      Comment: occ             HISTORY OF PRESENT ILLNESS: Chicho Mcginnis is a 42 y.o. female who presents to the office for a routine follow up visit. Anxiety -   Doing well on Xanax PRN. Has 0.5 mg tablets, but reports more often than not, she only takes 1/2 tab as needed. Xanax use varies in frequency from once every 2-3 weeks, to once daily for 2-3 days at a time if she is feeling particularly stressed. She still has several tablets left from when her prescription was last filled 3 months ago and does not need a refill yet. She was unable to tolerate Lexapro due to adverse side effects. Does not feel that she really needs a daily medication at this time. Vitamin D Deficiency -   Started on weekly Vitamin D supplementation 50,000 units a few months ago  Due for re-check today       REVIEW OF SYSTEMS:  Review of Systems   Constitutional: Negative for chills and fever. Respiratory: Negative for cough, shortness of breath and wheezing. Cardiovascular: Negative for chest pain and palpitations. Gastrointestinal: Negative for abdominal pain, nausea and vomiting. Psychiatric/Behavioral: Negative for depression, substance abuse and suicidal ideas. The patient is nervous/anxious ( intermittently, resolves with xanax). The patient does not have insomnia. PHYSICAL EXAMINATION:  Physical Exam   Constitutional: She is oriented to person, place, and time and well-developed, well-nourished, and in no distress. Cardiovascular: Normal rate, regular rhythm and normal heart sounds. Exam reveals no gallop and no friction rub. No murmur heard. Pulmonary/Chest: Effort normal and breath sounds normal. She has no wheezes. She has no rales. Neurological: She is alert and oriented to person, place, and time. Skin: Skin is warm and dry. Psychiatric: Mood, memory and affect normal.        RESULTS:  No results found for this visit on 06/13/17. ASSESSMENT/PLAN:  Laura Sandy was seen today for follow-up and medication refill. Diagnoses and all orders for this visit:    Anxiety  - Stable on current regimen  -  reviewed. No controlled medications other than xanax prescribed by me  - Advised patient that she may call for a refill if needed before her next appointment     Vitamin D deficiency  - Will re-check vitamin D level to assess need for ongoing supplementation   -     VITAMIN D, 25 HYDROXY; Future    Other orders  -     Cancel: ALPRAZolam (XANAX) 0.5 mg tablet;  Take 1/2 to 1 tab daily as needed for anxiety MDD: 1 tab    Follow up 3 months  sooner MARKUS Hinton   June 13, 2017    11:57 AM

## 2017-06-15 ENCOUNTER — HOSPITAL ENCOUNTER (OUTPATIENT)
Dept: LAB | Age: 37
Discharge: HOME | End: 2017-06-15
Payer: COMMERCIAL

## 2017-06-15 ENCOUNTER — HOSPITAL ENCOUNTER (OUTPATIENT)
Dept: INFUSION THERAPY | Age: 37
Discharge: HOME | End: 2017-06-15
Payer: COMMERCIAL

## 2017-06-15 ENCOUNTER — HOSPITAL ENCOUNTER (OUTPATIENT)
Dept: ONCOLOGY | Age: 37
Discharge: HOME | End: 2017-06-15

## 2017-06-15 ENCOUNTER — CLINICAL SUPPORT (OUTPATIENT)
Dept: ONCOLOGY | Age: 37
End: 2017-06-15

## 2017-06-15 VITALS
TEMPERATURE: 97.7 F | DIASTOLIC BLOOD PRESSURE: 72 MMHG | HEART RATE: 87 BPM | SYSTOLIC BLOOD PRESSURE: 128 MMHG | RESPIRATION RATE: 16 BRPM | OXYGEN SATURATION: 100 %

## 2017-06-15 DIAGNOSIS — E55.9 VITAMIN D DEFICIENCY: ICD-10-CM

## 2017-06-15 DIAGNOSIS — D75.1 POLYCYTHEMIA, SECONDARY: Primary | ICD-10-CM

## 2017-06-15 DIAGNOSIS — D75.1 POLYCYTHEMIA, SECONDARY: ICD-10-CM

## 2017-06-15 LAB
BASO+EOS+MONOS # BLD AUTO: 0.4 K/UL (ref 0–2.3)
BASO+EOS+MONOS # BLD AUTO: 8 % (ref 0.1–17)
DIFFERENTIAL METHOD BLD: NORMAL
ERYTHROCYTE [DISTWIDTH] IN BLOOD BY AUTOMATED COUNT: 12.4 % (ref 11.5–14.5)
HCT VFR BLD AUTO: 43.1 % (ref 36–48)
HGB BLD-MCNC: 15.1 G/DL (ref 12–16)
LYMPHOCYTES # BLD AUTO: 32 % (ref 14–44)
LYMPHOCYTES # BLD: 1.7 K/UL (ref 1.1–5.9)
MCH RBC QN AUTO: 33 PG (ref 25–35)
MCHC RBC AUTO-ENTMCNC: 35 G/DL (ref 31–37)
MCV RBC AUTO: 94.3 FL (ref 78–102)
NEUTS SEG # BLD: 3.2 K/UL (ref 1.8–9.5)
NEUTS SEG NFR BLD AUTO: 61 % (ref 40–70)
PLATELET # BLD AUTO: 165 K/UL (ref 140–440)
RBC # BLD AUTO: 4.57 M/UL (ref 4.1–5.1)
WBC # BLD AUTO: 5.3 K/UL (ref 4.5–13)

## 2017-06-15 PROCEDURE — 82306 VITAMIN D 25 HYDROXY: CPT | Performed by: PHYSICIAN ASSISTANT

## 2017-06-15 PROCEDURE — 36415 COLL VENOUS BLD VENIPUNCTURE: CPT

## 2017-06-15 NOTE — PROGRESS NOTES
[facility] Progress Note    Date: Kierra 15, 2017    Name: Mayte Jama    MRN: [id number]         : 1980      Ms. Fuad Porter was assessed and education was provided. Ms. Steph Ge vitals were reviewed and patient was observed for 5 minutes prior to treatment. Visit Vitals    /72 (BP 1 Location: Left arm, BP Patient Position: At rest;Sitting)    Pulse 87    Temp 97.7 °F (36.5 °C)    Resp 16    SpO2 100%    Breastfeeding No     CBC venipuncture to left AC x 1 attempt    Lab results were obtained and reviewed. Recent Results (from the past 12 hour(s))   CBC WITH 3 PART DIFF    Collection Time: 06/15/17  1:28 PM   Result Value Ref Range    WBC 5.3 4.5 - 13.0 K/uL    RBC 4.57 4.10 - 5.10 M/uL    HGB 15.1 12.0 - 16.0 g/dL    HCT 43.1 36 - 48 %    MCV 94.3 78 - 102 FL    MCH 33.0 25.0 - 35.0 PG    MCHC 35.0 31 - 37 g/dL    RDW 12.4 11.5 - 14.5 %    PLATELET 971 429 - 596 K/uL    NEUTROPHILS 61 40 - 70 %    MIXED CELLS 8 0.1 - 17 %    LYMPHOCYTES 32 14 - 44 %    ABS. NEUTROPHILS 3.2 1.8 - 9.5 K/UL    ABS. MIXED CELLS 0.4 0.0 - 2.3 K/uL    ABS. LYMPHOCYTES 1.7 1.1 - 5.9 K/UL    DF AUTOMATED       Therapeutic phlebotomy held for HCT 43.1. Patient armband removed and shredded. Ms. Fuad Porter was discharged from [place] [address] in stable condition at 1340. She is to return on 16 at 1000 for her next appointment for CBC phlebotomy.     Pura Gerber RN  Kierra 15, 2017

## 2017-06-16 LAB — 25(OH)D3 SERPL-MCNC: 40.6 NG/ML (ref 30–100)

## 2017-07-13 ENCOUNTER — HOSPITAL ENCOUNTER (OUTPATIENT)
Dept: ONCOLOGY | Age: 37
Discharge: HOME | End: 2017-07-13

## 2017-07-13 ENCOUNTER — CLINICAL SUPPORT (OUTPATIENT)
Dept: ONCOLOGY | Age: 37
End: 2017-07-13

## 2017-07-13 ENCOUNTER — HOSPITAL ENCOUNTER (OUTPATIENT)
Dept: INFUSION THERAPY | Age: 37
Discharge: HOME | End: 2017-07-13
Payer: COMMERCIAL

## 2017-07-13 VITALS
DIASTOLIC BLOOD PRESSURE: 81 MMHG | RESPIRATION RATE: 16 BRPM | TEMPERATURE: 98 F | HEART RATE: 87 BPM | SYSTOLIC BLOOD PRESSURE: 138 MMHG

## 2017-07-13 DIAGNOSIS — D75.1 SECONDARY POLYCYTHEMIA: ICD-10-CM

## 2017-07-13 DIAGNOSIS — D75.1 SECONDARY POLYCYTHEMIA: Primary | ICD-10-CM

## 2017-07-13 LAB
BASO+EOS+MONOS # BLD AUTO: 0.6 K/UL (ref 0–2.3)
BASO+EOS+MONOS # BLD AUTO: 10 % (ref 0.1–17)
DIFFERENTIAL METHOD BLD: NORMAL
ERYTHROCYTE [DISTWIDTH] IN BLOOD BY AUTOMATED COUNT: 11.8 % (ref 11.5–14.5)
HCT VFR BLD AUTO: 44.9 % (ref 36–48)
HGB BLD-MCNC: 15.2 G/DL (ref 12–16)
LYMPHOCYTES # BLD AUTO: 26 % (ref 14–44)
LYMPHOCYTES # BLD: 1.6 K/UL (ref 1.1–5.9)
MCH RBC QN AUTO: 32.3 PG (ref 25–35)
MCHC RBC AUTO-ENTMCNC: 33.9 G/DL (ref 31–37)
MCV RBC AUTO: 95.5 FL (ref 78–102)
NEUTS SEG # BLD: 3.9 K/UL (ref 1.8–9.5)
NEUTS SEG NFR BLD AUTO: 64 % (ref 40–70)
PLATELET # BLD AUTO: 177 K/UL (ref 140–440)
RBC # BLD AUTO: 4.7 M/UL (ref 4.1–5.1)
WBC # BLD AUTO: 6.1 K/UL (ref 4.5–13)

## 2017-07-13 PROCEDURE — 36415 COLL VENOUS BLD VENIPUNCTURE: CPT

## 2017-08-10 ENCOUNTER — CLINICAL SUPPORT (OUTPATIENT)
Dept: ONCOLOGY | Age: 37
End: 2017-08-10

## 2017-08-10 ENCOUNTER — HOSPITAL ENCOUNTER (OUTPATIENT)
Dept: ONCOLOGY | Age: 37
Discharge: HOME | End: 2017-08-10

## 2017-08-10 ENCOUNTER — HOSPITAL ENCOUNTER (OUTPATIENT)
Dept: INFUSION THERAPY | Age: 37
Discharge: HOME | End: 2017-08-10
Payer: COMMERCIAL

## 2017-08-10 VITALS
TEMPERATURE: 97.5 F | SYSTOLIC BLOOD PRESSURE: 128 MMHG | HEART RATE: 82 BPM | RESPIRATION RATE: 18 BRPM | OXYGEN SATURATION: 100 % | DIASTOLIC BLOOD PRESSURE: 79 MMHG

## 2017-08-10 DIAGNOSIS — D75.1 POLYCYTHEMIA, SECONDARY: ICD-10-CM

## 2017-08-10 DIAGNOSIS — D75.1 POLYCYTHEMIA, SECONDARY: Primary | ICD-10-CM

## 2017-08-10 LAB
BASO+EOS+MONOS # BLD AUTO: 0.8 K/UL (ref 0–2.3)
BASO+EOS+MONOS # BLD AUTO: 11 % (ref 0.1–17)
DIFFERENTIAL METHOD BLD: NORMAL
ERYTHROCYTE [DISTWIDTH] IN BLOOD BY AUTOMATED COUNT: 11.5 % (ref 11.5–14.5)
HCT VFR BLD AUTO: 42.9 % (ref 36–48)
HGB BLD-MCNC: 15 G/DL (ref 12–16)
LYMPHOCYTES # BLD AUTO: 26 % (ref 14–44)
LYMPHOCYTES # BLD: 1.9 K/UL (ref 1.1–5.9)
MCH RBC QN AUTO: 33.1 PG (ref 25–35)
MCHC RBC AUTO-ENTMCNC: 35 G/DL (ref 31–37)
MCV RBC AUTO: 94.7 FL (ref 78–102)
NEUTS SEG # BLD: 4.5 K/UL (ref 1.8–9.5)
NEUTS SEG NFR BLD AUTO: 63 % (ref 40–70)
PLATELET # BLD AUTO: 170 K/UL (ref 140–440)
RBC # BLD AUTO: 4.53 M/UL (ref 4.1–5.1)
WBC # BLD AUTO: 7.2 K/UL (ref 4.5–13)

## 2017-08-10 PROCEDURE — 36415 COLL VENOUS BLD VENIPUNCTURE: CPT

## 2017-08-10 RX ORDER — SODIUM CHLORIDE 0.9 % (FLUSH) 0.9 %
10-40 SYRINGE (ML) INJECTION AS NEEDED
Status: DISCONTINUED | OUTPATIENT
Start: 2017-08-10 | End: 2017-08-14 | Stop reason: HOSPADM

## 2017-08-10 RX ORDER — SODIUM CHLORIDE 9 MG/ML
500 INJECTION, SOLUTION INTRAVENOUS AS NEEDED
Status: DISCONTINUED | OUTPATIENT
Start: 2017-08-10 | End: 2017-08-14 | Stop reason: HOSPADM

## 2017-09-01 DIAGNOSIS — Z76.0 ENCOUNTER FOR MEDICATION REFILL: ICD-10-CM

## 2017-09-01 RX ORDER — NAPROXEN 500 MG/1
500 TABLET ORAL
Qty: 60 TAB | Refills: 0 | Status: SHIPPED | OUTPATIENT
Start: 2017-09-01 | End: 2017-10-02 | Stop reason: SDUPTHER

## 2017-09-01 NOTE — TELEPHONE ENCOUNTER
9/1/2017  5:55 PM    Chief Complaint   Patient presents with    Medication Refill       Noted refill request for Naproxen. PCP THOMAS Hastings PA-C. Refill completed. Noted upcoming and last visits.

## 2017-09-01 NOTE — TELEPHONE ENCOUNTER
Requested Prescriptions     Pending Prescriptions Disp Refills    naproxen (NAPROSYN) 500 mg tablet 60 Tab 3     Sig: Take 1 Tab by mouth two (2) times daily (with meals).      Patient is completely out of medication

## 2017-09-14 ENCOUNTER — HOSPITAL ENCOUNTER (OUTPATIENT)
Dept: ONCOLOGY | Age: 37
Discharge: HOME | End: 2017-09-14

## 2017-09-14 ENCOUNTER — CLINICAL SUPPORT (OUTPATIENT)
Dept: ONCOLOGY | Age: 37
End: 2017-09-14

## 2017-09-14 ENCOUNTER — HOSPITAL ENCOUNTER (OUTPATIENT)
Dept: INFUSION THERAPY | Age: 37
Discharge: HOME | End: 2017-09-14
Payer: COMMERCIAL

## 2017-09-14 VITALS
SYSTOLIC BLOOD PRESSURE: 120 MMHG | RESPIRATION RATE: 16 BRPM | DIASTOLIC BLOOD PRESSURE: 86 MMHG | TEMPERATURE: 98.1 F | HEART RATE: 90 BPM

## 2017-09-14 DIAGNOSIS — D75.1 SECONDARY POLYCYTHEMIA: Primary | ICD-10-CM

## 2017-09-14 DIAGNOSIS — D75.1 SECONDARY POLYCYTHEMIA: ICD-10-CM

## 2017-09-14 LAB
BASO+EOS+MONOS # BLD AUTO: 0.7 K/UL (ref 0–2.3)
BASO+EOS+MONOS # BLD AUTO: 11 % (ref 0.1–17)
DIFFERENTIAL METHOD BLD: NORMAL
ERYTHROCYTE [DISTWIDTH] IN BLOOD BY AUTOMATED COUNT: 11.6 % (ref 11.5–14.5)
HCT VFR BLD AUTO: 42.7 % (ref 36–48)
HGB BLD-MCNC: 14.8 G/DL (ref 12–16)
LYMPHOCYTES # BLD: 1.7 K/UL (ref 1.1–5.9)
LYMPHOCYTES NFR BLD: 27 % (ref 14–44)
MCH RBC QN AUTO: 32.7 PG (ref 25–35)
MCHC RBC AUTO-ENTMCNC: 34.7 G/DL (ref 31–37)
MCV RBC AUTO: 94.3 FL (ref 78–102)
NEUTS SEG # BLD: 4 K/UL (ref 1.8–9.5)
NEUTS SEG NFR BLD: 62 % (ref 40–70)
PLATELET # BLD AUTO: 166 K/UL (ref 140–440)
RBC # BLD AUTO: 4.53 M/UL (ref 4.1–5.1)
WBC # BLD AUTO: 6.4 K/UL (ref 4.5–13)

## 2017-09-14 PROCEDURE — 36415 COLL VENOUS BLD VENIPUNCTURE: CPT

## 2017-09-14 NOTE — PROGRESS NOTES
[facility] Progress Note    Date: 2017    Name: Felisha Taylor    MRN: [id number]         : 1980      Ms. Kang Jaeger was assessed and education was provided. Ms. Leyva Feeling vitals were reviewed and patient was observed for 5 minutes prior to treatment. Visit Vitals    /86 (BP 1 Location: Right arm, BP Patient Position: At rest;Sitting)    Pulse 90    Temp 98.1 °F (36.7 °C)    Resp 16    Breastfeeding No       Lab results were obtained and reviewed. Recent Results (from the past 12 hour(s))   CBC WITH 3 PART DIFF    Collection Time: 17 11:24 AM   Result Value Ref Range    WBC 6.4 4.5 - 13.0 K/uL    RBC 4.53 4.10 - 5.10 M/uL    HGB 14.8 12.0 - 16.0 g/dL    HCT 42.7 36 - 48 %    MCV 94.3 78 - 102 FL    MCH 32.7 25.0 - 35.0 PG    MCHC 34.7 31 - 37 g/dL    RDW 11.6 11.5 - 14.5 %    PLATELET 116 100 - 902 K/uL    NEUTROPHILS 62 40 - 70 %    MIXED CELLS 11 0.1 - 17 %    LYMPHOCYTES 27 14 - 44 %    ABS. NEUTROPHILS 4.0 1.8 - 9.5 K/UL    ABS. MIXED CELLS 0.7 0.0 - 2.3 K/uL    ABS. LYMPHOCYTES 1.7 1.1 - 5.9 K/UL    DF AUTOMATED         Therapeutic phlebotomy held for HCT 42.7. Patient armband removed and shredded. Ms. Kang Jaeger was discharged from [place] [address] in stable condition at 1155. She is to return on 10/12/17 at 1300 for her next appointment for CBC/phlebotomy monthly status.     Aneesh Marcus RN  2017  12:02 PM

## 2017-09-21 DIAGNOSIS — F43.23 ADJUSTMENT DISORDER WITH MIXED ANXIETY AND DEPRESSED MOOD: ICD-10-CM

## 2017-09-21 RX ORDER — MONTELUKAST SODIUM 10 MG/1
10 TABLET ORAL DAILY
Qty: 30 TAB | Refills: 1 | Status: SHIPPED | OUTPATIENT
Start: 2017-09-21 | End: 2017-10-26 | Stop reason: SDUPTHER

## 2017-09-21 NOTE — TELEPHONE ENCOUNTER
Requested Prescriptions     Pending Prescriptions Disp Refills    montelukast (SINGULAIR) 10 mg tablet       Sig: Take 1 Tab by mouth daily. [facility] [address]  Patient aware of 24-48 hour turn around.

## 2017-09-21 NOTE — TELEPHONE ENCOUNTER
I'll approve the refill, but I had wanted to see her back in about 3 months (around now) so please have her schedule a routine follow up with me at her convenience. thanks!

## 2017-09-29 ENCOUNTER — OFFICE VISIT (OUTPATIENT)
Dept: ONCOLOGY | Age: 37
End: 2017-09-29

## 2017-09-29 ENCOUNTER — HOSPITAL ENCOUNTER (OUTPATIENT)
Dept: ONCOLOGY | Age: 37
Discharge: HOME | End: 2017-09-29

## 2017-09-29 ENCOUNTER — HOSPITAL ENCOUNTER (OUTPATIENT)
Dept: LAB | Age: 37
Discharge: HOME | End: 2017-09-29
Payer: COMMERCIAL

## 2017-09-29 VITALS
DIASTOLIC BLOOD PRESSURE: 80 MMHG | HEART RATE: 79 BPM | SYSTOLIC BLOOD PRESSURE: 124 MMHG | WEIGHT: 174 LBS | BODY MASS INDEX: 26.46 KG/M2 | TEMPERATURE: 98.2 F

## 2017-09-29 DIAGNOSIS — E55.9 VITAMIN D DEFICIENCY: ICD-10-CM

## 2017-09-29 DIAGNOSIS — F41.9 ANXIETY: ICD-10-CM

## 2017-09-29 DIAGNOSIS — D75.1 POLYCYTHEMIA: Primary | ICD-10-CM

## 2017-09-29 DIAGNOSIS — D75.1 POLYCYTHEMIA: ICD-10-CM

## 2017-09-29 LAB
ALBUMIN SERPL-MCNC: 4 G/DL (ref 3.4–5)
ALBUMIN/GLOB SERPL: 1.4 {RATIO} (ref 0.8–1.7)
ALP SERPL-CCNC: 79 U/L (ref 45–117)
ALT SERPL-CCNC: 21 U/L (ref 13–56)
ANION GAP SERPL CALC-SCNC: 8 MMOL/L (ref 3–18)
AST SERPL-CCNC: 20 U/L (ref 15–37)
BASO+EOS+MONOS # BLD AUTO: 0.5 K/UL (ref 0–2.3)
BASO+EOS+MONOS # BLD AUTO: 9 % (ref 0.1–17)
BILIRUB SERPL-MCNC: 0.6 MG/DL (ref 0.2–1)
BUN SERPL-MCNC: 7 MG/DL (ref 7–18)
BUN/CREAT SERPL: 10 (ref 12–20)
CALCIUM SERPL-MCNC: 9 MG/DL (ref 8.5–10.1)
CHLORIDE SERPL-SCNC: 104 MMOL/L (ref 100–108)
CO2 SERPL-SCNC: 25 MMOL/L (ref 21–32)
CREAT SERPL-MCNC: 0.71 MG/DL (ref 0.6–1.3)
DIFFERENTIAL METHOD BLD: NORMAL
ERYTHROCYTE [DISTWIDTH] IN BLOOD BY AUTOMATED COUNT: 11.5 % (ref 11.5–14.5)
FERRITIN SERPL-MCNC: 20 NG/ML (ref 8–388)
GLOBULIN SER CALC-MCNC: 2.9 G/DL (ref 2–4)
GLUCOSE SERPL-MCNC: 78 MG/DL (ref 74–99)
HCT VFR BLD AUTO: 44 % (ref 36–48)
HGB BLD-MCNC: 15.4 G/DL (ref 12–16)
IRON SATN MFR SERPL: 33 %
IRON SERPL-MCNC: 125 UG/DL (ref 50–175)
LYMPHOCYTES # BLD: 1.4 K/UL (ref 1.1–5.9)
LYMPHOCYTES NFR BLD: 24 % (ref 14–44)
MCH RBC QN AUTO: 32.9 PG (ref 25–35)
MCHC RBC AUTO-ENTMCNC: 35 G/DL (ref 31–37)
MCV RBC AUTO: 94 FL (ref 78–102)
NEUTS SEG # BLD: 4 K/UL (ref 1.8–9.5)
NEUTS SEG NFR BLD: 67 % (ref 40–70)
PLATELET # BLD AUTO: 170 K/UL (ref 140–440)
POTASSIUM SERPL-SCNC: 5.1 MMOL/L (ref 3.5–5.5)
PROT SERPL-MCNC: 6.9 G/DL (ref 6.4–8.2)
RBC # BLD AUTO: 4.68 M/UL (ref 4.1–5.1)
SODIUM SERPL-SCNC: 137 MMOL/L (ref 136–145)
TIBC SERPL-MCNC: 378 UG/DL (ref 250–450)
WBC # BLD AUTO: 5.9 K/UL (ref 4.5–13)

## 2017-09-29 PROCEDURE — 36415 COLL VENOUS BLD VENIPUNCTURE: CPT | Performed by: NURSE PRACTITIONER

## 2017-09-29 PROCEDURE — 82728 ASSAY OF FERRITIN: CPT | Performed by: NURSE PRACTITIONER

## 2017-09-29 PROCEDURE — 80053 COMPREHEN METABOLIC PANEL: CPT | Performed by: NURSE PRACTITIONER

## 2017-09-29 PROCEDURE — 82306 VITAMIN D 25 HYDROXY: CPT | Performed by: NURSE PRACTITIONER

## 2017-09-29 PROCEDURE — 83540 ASSAY OF IRON: CPT | Performed by: NURSE PRACTITIONER

## 2017-09-29 NOTE — MR AVS SNAPSHOT
Visit Information Date & Time Provider Department Dept. Phone Encounter #  
 9/29/2017 10:00 AM Marybeth Cruz [address] Office [phone] [id number] Follow-up Instructions Return in about 4 months (around 1/29/2018). Your Appointments 1/26/2018 10:15 AM  
Office Visit with MD Jolene Vallesgriffin 77 [address] [facility]) Appt Note: 4 mo fu  
 [facility] [address] [facility] [address] [place] [address]  
[phone]  
  
   
 [facility] [address] Upcoming Health Maintenance Date Due Pneumococcal 19-64 Medium Risk (1 of 1 - PPSV23) 5/30/1997 INFLUENZA AGE 9 TO ADULT 8/1/2017 PAP AKA CERVICAL CYTOLOGY 6/13/2020 DTaP/Tdap/Td series (2 - Td) 12/20/2022 Allergies as of 9/29/2017  Review Complete On: 9/14/2017 By: Savanah Kate RN No Known Allergies Current Immunizations  Reviewed on 9/14/2017 Name Date Influenza Vaccine 9/12/2015 Tdap 12/20/2012 Not reviewed this visit You Were Diagnosed With   
  
 Codes Comments Polycythemia    -  Primary ICD-10-CM: D75.1 ICD-9-CM: 238.4 Anxiety     ICD-10-CM: F41.9 ICD-9-CM: 300.00 Vitamin D deficiency     ICD-10-CM: E55.9 ICD-9-CM: 268.9 Vitals BP Pulse Temp Weight(growth percentile) LMP BMI  
 124/80 (BP 1 Location: Left arm, BP Patient Position: Sitting) 79 98.2 °F (36.8 °C) (Oral) 174 lb (78.9 kg) 10/29/2014 26.46 kg/m2 OB Status Smoking Status Hysterectomy Current Every Day Smoker BMI and BSA Data Body Mass Index Body Surface Area  
 26.46 kg/m 2 1.95 m 2 Preferred Pharmacy Pharmacy Name Phone [id number] [place] [address], [address] [facility] [facility] [phone] Your Updated Medication List  
  
   
This list is accurate as of: 9/29/17 10:57 AM.  Always use your most recent med list.  
  
  
  
  
 ALPRAZolam 0.5 mg tablet Commonly known as:  Billy Powell Take 1/2 to 1 tab daily as needed for anxiety MDD: 1 tab  
  
 dicyclomine 20 mg tablet Commonly known as:  BENTYL Take 20 mg by mouth every six (6) hours. ergocalciferol 50,000 unit capsule Commonly known as:  VITAMIN D2 Take 1 Cap by mouth every seven (7) days. fexofenadine 180 mg tablet Commonly known as:  Gucci Dix Take 1 Tab by mouth daily. montelukast 10 mg tablet Commonly known as:  SINGULAIR Take 1 Tab by mouth daily. naproxen 500 mg tablet Commonly known as:  NAPROSYN Take 1 Tab by mouth every twelve (12) hours as needed. OTEZLA STARTER PO Take  by mouth. We Performed the Following COMPLETE CBC & AUTO DIFF WBC [24555 CPT(R)] Follow-up Instructions Return in about 4 months (around 1/29/2018). To-Do List   
 09/29/2017 Lab:  CBC WITH 3 PART DIFF   
  
 10/12/2017 1:00 PM  
  Appointment with [address] at [place] [address] ([phone]) Patient Instructions Polycythemia: Care Instructions Your Care Instructions Polycythemia (say \"paw-serge-sy-THEE-idalia-uh) is an abnormal increase in red blood cells. It happens when the tissue inside your bones (bone marrow) makes too much blood. It also can occur if your blood does not have enough liquid, or plasma. This can make the number of red blood cells seem higher than normal. The extra red blood cells make your blood thicker than normal. This may raise your risk for blood clots that can cause heart attacks or strokes. Clots can form in the deep veins of the body, a condition called deep vein thrombosis. Or, a clot can travel through the blood to a lung (a pulmonary embolism). Your doctor may treat you by taking out some of your blood (phlebotomy). The process is like donating blood. Your doctor may even recommend that you donate blood.  You may take pills to stop your body from making red blood cells. You also will get treatment for any other conditions that may cause your body to make too many red blood cells. Follow-up care is a key part of your treatment and safety. Be sure to make and go to all appointments, and call your doctor if you are having problems. It's also a good idea to know your test results and keep a list of the medicines you take. How can you care for yourself at home? · Take your medicines exactly as prescribed. Call your doctor if you think you are having a problem with your medicine. · Drink plenty of fluids, enough so that your urine is light yellow or clear like water, before and after you have blood removed. If you have kidney, heart, or liver disease and have to limit fluids, talk with your doctor before you increase the amount of fluids you drink. · Take it easy after you have had blood removed. Do not do vigorous exercise. · If your doctor recommends aspirin, take it exactly as prescribed. Call your doctor if you think you are having a problem with your medicine. · Do not smoke. Smoking increases the risk of blood clots and may reduce the amount of oxygen in your blood. If you need help quitting, talk to your doctor about stop-smoking programs and medicines. These can increase your chances of quitting for good. · Take an antihistamine such as diphenhydramine (Benadryl) if your skin is itchy. Some people who have this condition have itching. · Wear medical alert jewelry that lists your clotting problem. You can buy this at most drugstores. When should you call for help? Call 911 anytime you think you may need emergency care. For example, call if: 
· You have sudden chest pain and shortness of breath, or you cough up blood. · You have symptoms of a stroke. These may include: 
¨ Sudden numbness, tingling, weakness, or loss of movement in your face, arm, or leg, especially on only one side of your body. ¨ Sudden vision changes. ¨ Sudden trouble speaking. ¨ Sudden confusion or trouble understanding simple statements. ¨ Sudden problems with walking or balance. ¨ A sudden, severe headache that is different from past headaches. · You have symptoms of a heart attack. These may include: ¨ Chest pain or pressure, or a strange feeling in the chest. 
¨ Sweating. ¨ Shortness of breath. ¨ Nausea or vomiting. ¨ Pain, pressure, or a strange feeling in the back, neck, jaw, or upper belly or in one or both shoulders or arms. ¨ Lightheadedness or sudden weakness. ¨ A fast or irregular heartbeat. After you call 911, the  may tell you to chew 1 adult-strength or 2 to 4 low-dose aspirin. Wait for an ambulance. Do not try to drive yourself. Call your doctor now or seek immediate medical care if: 
· You have signs of a blood clot, such as: 
¨ Pain in your calf, back of knee, thigh, or groin. ¨ Redness and swelling in your leg or groin. Watch closely for changes in your health, and be sure to contact your doctor if you have any problems. Where can you learn more? Go to http://naviValkyrie Computer Systemszofia.info/. Enter T001 in the search box to learn more about \"Polycythemia: Care Instructions. \" Current as of: October 13, 2016 Content Version: 11.3 © 7591-5150 [employer]. Care instructions adapted under license by [employer] (which disclaims liability or warranty for this information). If you have questions about a medical condition or this instruction, always ask your healthcare professional. [place] [address] any warranty or liability for your use of this information. Learning About Vitamin D Why is it important to get enough vitamin D? Your body needs vitamin D to absorb calcium. Calcium keeps your bones and muscles, including your heart, healthy and strong. If your muscles don't get enough calcium, they can cramp, hurt, or feel weak. You may have long-term (chronic) muscle aches and pains. If you don't get enough vitamin D throughout life, you have an increased chance of having thin and brittle bones (osteoporosis) in your later years. Children who don't get enough vitamin D may not grow as much as others their age. They also have a chance of getting a rare disease called rickets. It causes weak bones. Vitamin D and calcium are added to many foods. And your body uses sunshine to make its own vitamin D. How much vitamin D do you need? The [place] of Medicine recommends that people ages 3 through 79 get 600 IU (international units) every day. Adults 71 and older need 800 IU every day. Blood tests for vitamin D can check your vitamin D level. But there is no standard normal range used by all laboratories. The [place] of Medicine recommends a blood level of 20 ng/mL of vitamin D for healthy bones. And most people in the [place] and [facility] ([facility]) meet this goal. 
How can you get more vitamin D? Foods that contain vitamin D include: 
· [place], tuna, and mackerel. These are some of the best foods to eat when you need to get more vitamin D. 
· Cheese, egg yolks, and beef liver. These foods have vitamin D in small amounts. · Milk, soy drinks, orange juice, yogurt, margarine, and some kinds of cereal have vitamin D added to them. Some people don't make vitamin D as well as others. They may have to take extra care in getting enough vitamin D. Things that reduce how much vitamin D your body makes include: · Dark skin, such as many  Americans have. · Age, especially if you are older than 72. · Digestive problems, such as Crohn's or celiac disease. · Liver and kidney disease. Some people who do not get enough vitamin D may need supplements. Are there any risks from taking vitamin D? 
· Too much vitamin D: 
¨ Can damage your kidneys. ¨ Can cause nausea and vomiting, constipation, and weakness. ¨ Raises the amount of calcium in your blood.  If this happens, you can get confused or have an irregular heart rhythm. · Vitamin D may interact with other medicines. Tell your doctor about all of the medicines you take, including over-the-counter drugs, herbs, and pills. Tell your doctor about all of your current medical problems. Where can you learn more? Go to http://castro.info/. Enter [id number] in the search box to learn more about \"Learning About Vitamin D.\" 
Current as of: July 26, 2016 Content Version: 11.3 © 1648-7697 [email or web address]. Care instructions adapted under license by [employer] (which disclaims liability or warranty for this information). If you have questions about a medical condition or this instruction, always ask your healthcare professional. [address] any warranty or liability for your use of this information. Introducing [facility]! Dear [address] [place] [address]: Thank you for requesting a [employer] account. Our records indicate that you already have an active [employer] account. You can access your account anytime at [email or web address] Did you know that you can access your hospital and ER discharge instructions at any time in [employer]? You can also review all of your test results from your hospital stay or ER visit. Additional Information If you have questions, please visit the Frequently Asked Questions section of the [employer] website at [email or web address] Remember, [employer] is NOT to be used for urgent needs. For medical emergencies, dial 911. Now available from your iPhone and Android! Please provide this summary of care documentation to your next provider. Your primary care clinician is listed as Aileen Erickson. If you have any questions after today's visit, please call [phone].

## 2017-09-29 NOTE — PATIENT INSTRUCTIONS
Polycythemia: Care Instructions  Your Care Instructions    Polycythemia (say \"paw-serge-sy-THEE-idalia-uh) is an abnormal increase in red blood cells. It happens when the tissue inside your bones (bone marrow) makes too much blood. It also can occur if your blood does not have enough liquid, or plasma. This can make the number of red blood cells seem higher than normal. The extra red blood cells make your blood thicker than normal. This may raise your risk for blood clots that can cause heart attacks or strokes. Clots can form in the deep veins of the body, a condition called deep vein thrombosis. Or, a clot can travel through the blood to a lung (a pulmonary embolism). Your doctor may treat you by taking out some of your blood (phlebotomy). The process is like donating blood. Your doctor may even recommend that you donate blood. You may take pills to stop your body from making red blood cells. You also will get treatment for any other conditions that may cause your body to make too many red blood cells. Follow-up care is a key part of your treatment and safety. Be sure to make and go to all appointments, and call your doctor if you are having problems. It's also a good idea to know your test results and keep a list of the medicines you take. How can you care for yourself at home? · Take your medicines exactly as prescribed. Call your doctor if you think you are having a problem with your medicine. · Drink plenty of fluids, enough so that your urine is light yellow or clear like water, before and after you have blood removed. If you have kidney, heart, or liver disease and have to limit fluids, talk with your doctor before you increase the amount of fluids you drink. · Take it easy after you have had blood removed. Do not do vigorous exercise. · If your doctor recommends aspirin, take it exactly as prescribed. Call your doctor if you think you are having a problem with your medicine. · Do not smoke.  Smoking increases the risk of blood clots and may reduce the amount of oxygen in your blood. If you need help quitting, talk to your doctor about stop-smoking programs and medicines. These can increase your chances of quitting for good. · Take an antihistamine such as diphenhydramine (Benadryl) if your skin is itchy. Some people who have this condition have itching. · Wear medical alert jewelry that lists your clotting problem. You can buy this at most drugstores. When should you call for help? Call 911 anytime you think you may need emergency care. For example, call if:  · You have sudden chest pain and shortness of breath, or you cough up blood. · You have symptoms of a stroke. These may include:  ¨ Sudden numbness, tingling, weakness, or loss of movement in your face, arm, or leg, especially on only one side of your body. ¨ Sudden vision changes. ¨ Sudden trouble speaking. ¨ Sudden confusion or trouble understanding simple statements. ¨ Sudden problems with walking or balance. ¨ A sudden, severe headache that is different from past headaches. · You have symptoms of a heart attack. These may include:  ¨ Chest pain or pressure, or a strange feeling in the chest.  ¨ Sweating. ¨ Shortness of breath. ¨ Nausea or vomiting. ¨ Pain, pressure, or a strange feeling in the back, neck, jaw, or upper belly or in one or both shoulders or arms. ¨ Lightheadedness or sudden weakness. ¨ A fast or irregular heartbeat. After you call 911, the  may tell you to chew 1 adult-strength or 2 to 4 low-dose aspirin. Wait for an ambulance. Do not try to drive yourself. Call your doctor now or seek immediate medical care if:  · You have signs of a blood clot, such as:  ¨ Pain in your calf, back of knee, thigh, or groin. ¨ Redness and swelling in your leg or groin. Watch closely for changes in your health, and be sure to contact your doctor if you have any problems. Where can you learn more?   Go to http://navi-zofia.info/. Enter B491 in the search box to learn more about \"Polycythemia: Care Instructions. \"  Current as of: October 13, 2016  Content Version: 11.3  © 9416-9335 [employer] Naked Song. Care instructions adapted under license by [employer]. (which disclaims liability or warranty for this information). If you have questions about a medical condition or this instruction, always ask your healthcare professional. [address] any warranty or liability for your use of this information. Learning About Vitamin D  Why is it important to get enough vitamin D? Your body needs vitamin D to absorb calcium. Calcium keeps your bones and muscles, including your heart, healthy and strong. If your muscles don't get enough calcium, they can cramp, hurt, or feel weak. You may have long-term (chronic) muscle aches and pains. If you don't get enough vitamin D throughout life, you have an increased chance of having thin and brittle bones (osteoporosis) in your later years. Children who don't get enough vitamin D may not grow as much as others their age. They also have a chance of getting a rare disease called rickets. It causes weak bones. Vitamin D and calcium are added to many foods. And your body uses sunshine to make its own vitamin D. How much vitamin D do you need? The [place] of Medicine recommends that people ages 3 through 79 get 600 IU (international units) every day. Adults 71 and older need 800 IU every day. Blood tests for vitamin D can check your vitamin D level. But there is no standard normal range used by all laboratories. The [place] of Medicine recommends a blood level of 20 ng/mL of vitamin D for healthy bones. And most people in the [place] and [facility] ([facility]) meet this goal.  How can you get more vitamin D? Foods that contain vitamin D include:  · [place], tuna, and mackerel.  These are some of the best foods to eat when you need to get more vitamin D.  · Cheese, egg yolks, and beef liver. These foods have vitamin D in small amounts. · Milk, soy drinks, orange juice, yogurt, margarine, and some kinds of cereal have vitamin D added to them. Some people don't make vitamin D as well as others. They may have to take extra care in getting enough vitamin D. Things that reduce how much vitamin D your body makes include:  · Dark skin, such as many  Americans have. · Age, especially if you are older than 72. · Digestive problems, such as Crohn's or celiac disease. · Liver and kidney disease. Some people who do not get enough vitamin D may need supplements. Are there any risks from taking vitamin D?  · Too much vitamin D:  ¨ Can damage your kidneys. ¨ Can cause nausea and vomiting, constipation, and weakness. ¨ Raises the amount of calcium in your blood. If this happens, you can get confused or have an irregular heart rhythm. · Vitamin D may interact with other medicines. Tell your doctor about all of the medicines you take, including over-the-counter drugs, herbs, and pills. Tell your doctor about all of your current medical problems. Where can you learn more? Go to http://navi-zofia.info/. Enter [id number] in the search box to learn more about \"Learning About Vitamin D.\"  Current as of: July 26, 2016  Content Version: 11.3  © 0286-6650 [employer]ric [employer]. Care instructions adapted under license by QuarterSpot (which disclaims liability or warranty for this information). If you have questions about a medical condition or this instruction, always ask your healthcare professional. [place] [address] any warranty or liability for your use of this information.

## 2017-09-29 NOTE — PROGRESS NOTES
Hematology/Oncology  Progress Note    Name: Jared Correa  Date : 2017  : 1980    PCP: Emani Vargas DO     Ms. Andrew Tinoco is a 42year old female who was seen for management of her secondary polycythemia. Current therapy: Therapeutic phlebotomy whenever the hematocrit is in excess of 45%. 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 She reports that she has significantly decreased her tobacco consumption and she is feeling much better. She has no new complaints or concerns to report at this time. The patient did not need therapeutic phlebotomy a week ago . The patient is requesting her frequency between infusion appointments be increased. Past medical history, family history, and social history: these were reviewed and remains unchanged.     Past Medical History:   Diagnosis Date    Discoid lupus     skin lupus    Hypertension     during pregnancy/6 months after    IBS (irritable bowel syndrome) 2016    Menorrhagia     hysterectomy    Polycythemia      Past Surgical History:   Procedure Laterality Date    DELIVERY       HX DILATION AND CURETTAGE      HX GYN      laproscopy    HX HYSTERECTOMY       Social History     Social History    Marital status:      Spouse name: N/A    Number of children: N/A    Years of education: N/A     Occupational History          Social History Main Topics    Smoking status: Current Every Day Smoker     Packs/day: 0.50     Years: 15.00     Types: Cigarettes    Smokeless tobacco: Never Used      Comment:     Alcohol use 0.0 oz/week     1 - 4 Glasses of wine per week      Comment: occ    Drug use: No    Sexual activity: Yes     Partners: Male     Birth control/ protection: None     Other Topics Concern    Not on file     Social History Narrative     Family History   Problem Relation Age of Onset    Alcohol abuse Mother     Drug Abuse Mother Rosemary Valle Arthritis-rheumatoid Mother     Hypertension Father     Hypertension Sister     High Cholesterol Sister     Dementia Sister     Stroke Sister     Arthritis-rheumatoid Maternal Grandmother     Alcohol abuse Maternal Grandfather     Arthritis-rheumatoid Maternal Grandfather     Other Maternal Grandfather      ruptured bowel    Stroke Sister      Current Outpatient Prescriptions   Medication Sig Dispense Refill    montelukast (SINGULAIR) 10 mg tablet Take 1 Tab by mouth daily. 30 Tab 1    naproxen (NAPROSYN) 500 mg tablet Take 1 Tab by mouth every twelve (12) hours as needed. 60 Tab 0    APREMILAST (OTEZLA STARTER PO) Take  by mouth.  ergocalciferol (VITAMIN D2) 50,000 unit capsule Take 1 Cap by mouth every seven (7) days. 4 Cap 2    dicyclomine (BENTYL) 20 mg tablet Take 20 mg by mouth every six (6) hours.  ALPRAZolam (XANAX) 0.5 mg tablet Take 1/2 to 1 tab daily as needed for anxiety MDD: 1 tab 30 Tab 0    fexofenadine (ALLEGRA) 180 mg tablet Take 1 Tab by mouth daily. 90 Tab 3       Review of Systems  Constitutional: The patient has no acute distress or discomfort. HEENT: The patient denies recent head trauma, eye pain, blurred vision,  hearing deficit, oropharyngeal mucosal pain or lesions, and the patient denies throat pain or discomfort. Lymphatics: The patient denies palpable peripheral lymphadenopathy. Hematologic: The patient denies having bruising, bleeding, or progressive fatigue. Respiratory: Patient denies having shortness of breath, cough, sputum production, fever, or dyspnea on exertion. Cardiovascular: The patient denies having leg pain, leg swelling, heart palpitations, chest permit, chest pain, or lightheadedness. The patient denies having dyspnea on exertion. Gastrointestinal: The patient denies having nausea, emesis, or diarrhea. The patient denies having any hematemesis or blood in the stool.   Genitourinary: Patient denies having urinary urgency, frequency, or dysuria. The patient denies having blood in the urine. Psychological: The patient denies having symptoms of nervousness, anxiety, depression, or thoughts of harming himself some of this. Skin: Patient denies having skin rashes, skin, ulcerations, or unexplained itching or pruritus. Musculoskeletal: The patient denies having pain in the joints, tenderness, or bones. Objective:     Visit Vitals    /80 (BP 1 Location: Left arm, BP Patient Position: Sitting)    Pulse 79    Temp 98.2 °F (36.8 °C) (Oral)    Wt 78.9 kg (174 lb)    LMP 10/29/2014    BMI 26.46 kg/m2     ECOG 0  Physical Exam:   Gen. Appearance: The patient is in no acute distress. Skin: There is no bruise or rash. HEENT: The exam is unremarkable. Neck: Supple without lymphadenopathy or thyromegaly. Lungs: Clear to auscultation and percussion; there are no wheezes or rhonchi. Heart: Regular rate and rhythm; there are no murmurs, gallops, or rubs. Abdomen: Bowel sounds are present and normal.  There is no guarding, tenderness, or hepatosplenomegaly. Extremities: There is no clubbing, cyanosis, or edema. Neurologic: There are no focal neurologic deficits. Lymphatics: There is no palpable peripheral lymphadenopathy. Musculoskeletal: The patient has full range of motion at all joints. There is no evidence of joint deformity or effusions. There is no focal joint tenderness. Psychological/psychiatric: There is no clinical evidence of anxiety, depression, or melancholy.     Lab data:      Results for orders placed or performed during the hospital encounter of 09/29/17   CBC WITH 3 PART DIFF     Status: None   Result Value Ref Range Status    WBC 5.9 4.5 - 13.0 K/uL Final    RBC 4.68 4.10 - 5.10 M/uL Final    HGB 15.4 12.0 - 16.0 g/dL Final    HCT 44.0 36 - 48 % Final    MCV 94.0 78 - 102 FL Final    MCH 32.9 25.0 - 35.0 PG Final    MCHC 35.0 31 - 37 g/dL Final    RDW 11.5 11.5 - 14.5 % Final    PLATELET 730 885 - 399 K/uL Final NEUTROPHILS 67 40 - 70 % Final    MIXED CELLS 9 0.1 - 17 % Final    LYMPHOCYTES 24 14 - 44 % Final    ABS. NEUTROPHILS 4.0 1.8 - 9.5 K/UL Final    ABS. MIXED CELLS 0.5 0.0 - 2.3 K/uL Final    ABS. LYMPHOCYTES 1.4 1.1 - 5.9 K/UL Final     Comment: Test performed at [place] [address] Location. Results Reviewed by Medical Director. DF AUTOMATED   Final           Assessment:     1. Polycythemia    2. Anxiety    3. Vitamin D deficiency        Plan:   Polycythemia: I have explained to the patient that the CBC from today shows a WBC count  is 5.9  hemoglobin is 15.4 g/dL, and the hematocrit is 44.0%. She did not require therapeutic Phlebolotemy  At the last [facility] appointment a week ago. The patient has been unable to  stopped using tobacco at this time and she states she has decrease the amount she smokes. I have explained to the patient that as long as her hematocrit stays below 45%, she will not need to undergo therapeutic phlebotomy. The comprehensive metabolic panel, iron profile, ferritin level, and liver function tests will be obtained at this time. We will increase her CBC/phlebotomy orders from every 4 weeks to every 6 weeks begin with the next appointment. Vitamin D Deficiency: the patient has a history of vitamin D deficiency. I will repeat a vitamin D level today. She will be started on oral therapy if indicated. Anxiety: The patient will continue Xanax as needed. The patient reports decreased use of this medication    I will have her return to the clinic again in 4 months.   Orders Placed This Encounter    COMPLETE CBC & AUTO DIFF WBC    InHouse CBC ([email or web address])     Standing Status:   Future     Number of Occurrences:   1     Standing Expiration Date:   10/6/2017    VITAMIN D, 25 HYDROXY     Standing Status:   Future     Standing Expiration Date:   0/07/3448    METABOLIC PANEL, COMPREHENSIVE     Standing Status:   Future     Standing Expiration Date:   9/30/2018    IRON PROFILE Standing Status:   Future     Standing Expiration Date:   9/30/2018    FERRITIN     Standing Status:   Future     Standing Expiration Date:   9/30/2018       Joanne Sewell MD  9/29/2017

## 2017-09-30 LAB — 25(OH)D3 SERPL-MCNC: 32 NG/ML (ref 30–100)

## 2017-10-02 DIAGNOSIS — Z76.0 ENCOUNTER FOR MEDICATION REFILL: ICD-10-CM

## 2017-10-02 RX ORDER — NAPROXEN 500 MG/1
500 TABLET ORAL
Qty: 180 TAB | Refills: 0 | Status: SHIPPED | OUTPATIENT
Start: 2017-10-02 | End: 2019-07-26

## 2017-10-12 ENCOUNTER — HOSPITAL ENCOUNTER (OUTPATIENT)
Dept: ONCOLOGY | Age: 37
Discharge: HOME | End: 2017-10-12

## 2017-10-12 ENCOUNTER — HOSPITAL ENCOUNTER (OUTPATIENT)
Dept: INFUSION THERAPY | Age: 37
Discharge: HOME | End: 2017-10-12
Payer: COMMERCIAL

## 2017-10-12 ENCOUNTER — CLINICAL SUPPORT (OUTPATIENT)
Dept: ONCOLOGY | Age: 37
End: 2017-10-12

## 2017-10-12 VITALS
SYSTOLIC BLOOD PRESSURE: 121 MMHG | DIASTOLIC BLOOD PRESSURE: 76 MMHG | RESPIRATION RATE: 16 BRPM | HEART RATE: 88 BPM | TEMPERATURE: 97 F

## 2017-10-12 DIAGNOSIS — D75.1 SECONDARY POLYCYTHEMIA: Primary | ICD-10-CM

## 2017-10-12 DIAGNOSIS — D75.1 SECONDARY POLYCYTHEMIA: ICD-10-CM

## 2017-10-12 LAB
BASO+EOS+MONOS # BLD AUTO: 0.7 K/UL (ref 0–2.3)
BASO+EOS+MONOS # BLD AUTO: 11 % (ref 0.1–17)
DIFFERENTIAL METHOD BLD: NORMAL
ERYTHROCYTE [DISTWIDTH] IN BLOOD BY AUTOMATED COUNT: 12.1 % (ref 11.5–14.5)
HCT VFR BLD AUTO: 40.9 % (ref 36–48)
HGB BLD-MCNC: 14.4 G/DL (ref 12–16)
LYMPHOCYTES # BLD: 1.4 K/UL (ref 1.1–5.9)
LYMPHOCYTES NFR BLD: 21 % (ref 14–44)
MCH RBC QN AUTO: 33 PG (ref 25–35)
MCHC RBC AUTO-ENTMCNC: 35.2 G/DL (ref 31–37)
MCV RBC AUTO: 93.8 FL (ref 78–102)
NEUTS SEG # BLD: 4.6 K/UL (ref 1.8–9.5)
NEUTS SEG NFR BLD: 68 % (ref 40–70)
PLATELET # BLD AUTO: 171 K/UL (ref 140–440)
RBC # BLD AUTO: 4.36 M/UL (ref 4.1–5.1)
WBC # BLD AUTO: 6.7 K/UL (ref 4.5–13)

## 2017-10-12 PROCEDURE — 36415 COLL VENOUS BLD VENIPUNCTURE: CPT

## 2017-10-12 NOTE — PROGRESS NOTES
[facility] Progress Note    Date: 2017    Name: May Anton    MRN: [id number]         : 1980      Ms. Judith Oh was assessed and education was provided. Ms. Franchesca Rodríguez vitals were reviewed and patient was observed for 5 minutes prior to treatment. Visit Vitals    /76 (BP 1 Location: Right arm, BP Patient Position: At rest;Sitting)    Pulse 88    Temp 97 °F (36.1 °C)    Resp 16    Breastfeeding No       Lab results were obtained and reviewed. Recent Results (from the past 12 hour(s))   CBC WITH 3 PART DIFF    Collection Time: 10/12/17  1:35 PM   Result Value Ref Range    WBC 6.7 4.5 - 13.0 K/uL    RBC 4.36 4.10 - 5.10 M/uL    HGB 14.4 12.0 - 16.0 g/dL    HCT 40.9 36 - 48 %    MCV 93.8 78 - 102 FL    MCH 33.0 25.0 - 35.0 PG    MCHC 35.2 31 - 37 g/dL    RDW 12.1 11.5 - 14.5 %    PLATELET 423 290 - 566 K/uL    NEUTROPHILS 68 40 - 70 %    MIXED CELLS 11 0.1 - 17 %    LYMPHOCYTES 21 14 - 44 %    ABS. NEUTROPHILS 4.6 1.8 - 9.5 K/UL    ABS. MIXED CELLS 0.7 0.0 - 2.3 K/uL    ABS. LYMPHOCYTES 1.4 1.1 - 5.9 K/UL    DF AUTOMATED         Therapeutic phlebotomy held for HCT 40.9. Patient armband removed and shredded. Ms. Judith Oh was discharged from [place] [address] in stable condition at 1405. She is to return on 17 at 1300 for her next appointment for CBC/phlebotomy.       Riri Saab RN  2017  2:17 PM

## 2017-10-13 ENCOUNTER — TELEPHONE (OUTPATIENT)
Dept: FAMILY MEDICINE CLINIC | Age: 37
End: 2017-10-13

## 2017-10-13 DIAGNOSIS — F41.1 GENERALIZED ANXIETY DISORDER: Primary | ICD-10-CM

## 2017-10-13 NOTE — TELEPHONE ENCOUNTER
I returned a call to pt and pt states \" that she would like a referral to Psych. I informed pt that MARKUS Fernandez is out of the office today but I would send her the message and she may require pt to have a visit . Pt had some concerns about being referred to [facility] pt states \" that she tried calling them but could never get an answer\" Pt just didn't know if you would refer her to them or another office. \" I old pt once referral is done someone will contact her with date and time of the appointment. Pt verbalized understanding and before hanging up pt states \" that pt was told at last visit that pt could just call when running low on medication Xanax pt is requesting a refill on this medication. I told pt I will forward this message . Pt verbalized understanding. \"

## 2017-10-13 NOTE — TELEPHONE ENCOUNTER
Patient called in reference to possibly having a referral to a Ps doc. She is requesting a callback.

## 2017-10-16 NOTE — TELEPHONE ENCOUNTER
Psych referral ordered. Will make note about concerns with [facility] for referral coordinator. I can refill the Xanax in the meantime, but she will need an office visit.  Looks like she cancelled her follow up that was scheduled for 9/13 and never rescheduled

## 2017-10-17 NOTE — TELEPHONE ENCOUNTER
I returned a call to pt to let pt know that the referral for Psych has been placed and someone should be contacting pt with date and time of appointment. And Pt will need to have an appointment for refill for Xanax pt verbalized understanding and was transferred to  to schedule and appointment.

## 2017-10-26 ENCOUNTER — OFFICE VISIT (OUTPATIENT)
Dept: FAMILY MEDICINE CLINIC | Age: 37
End: 2017-10-26

## 2017-10-26 VITALS
HEART RATE: 103 BPM | DIASTOLIC BLOOD PRESSURE: 84 MMHG | BODY MASS INDEX: 26.67 KG/M2 | OXYGEN SATURATION: 97 % | RESPIRATION RATE: 20 BRPM | WEIGHT: 176 LBS | TEMPERATURE: 98 F | SYSTOLIC BLOOD PRESSURE: 127 MMHG | HEIGHT: 68 IN

## 2017-10-26 DIAGNOSIS — J30.1 CHRONIC SEASONAL ALLERGIC RHINITIS DUE TO POLLEN: ICD-10-CM

## 2017-10-26 DIAGNOSIS — F41.9 ANXIETY: Primary | ICD-10-CM

## 2017-10-26 RX ORDER — ALPRAZOLAM 0.5 MG/1
TABLET ORAL
Qty: 30 TAB | Refills: 0 | Status: SHIPPED | OUTPATIENT
Start: 2017-10-26 | End: 2018-09-13 | Stop reason: SDUPTHER

## 2017-10-26 RX ORDER — MONTELUKAST SODIUM 10 MG/1
10 TABLET ORAL DAILY
Qty: 30 TAB | Refills: 1 | Status: SHIPPED | OUTPATIENT
Start: 2017-10-26 | End: 2018-03-30 | Stop reason: SDUPTHER

## 2017-10-26 NOTE — MR AVS SNAPSHOT
Visit Information Date & Time Provider Department Dept. Phone Encounter #  
 10/26/2017  3:30 PM Jesica Worthington [place] [address] [phone] [id number] Your Appointments 1/26/2018 10:15 AM  
Office Visit with MD Joanne Vee [phone] [facility]) Appt Note: 4 mo fu  
 [facility] [address] [facility] [address] [facility] [id number] [phone]  
  
   
 [facility] [address] Upcoming Health Maintenance Date Due Pneumococcal 19-64 Medium Risk (1 of 1 - PPSV23) 5/30/1997 PAP AKA CERVICAL CYTOLOGY 6/13/2020 DTaP/Tdap/Td series (2 - Td) 12/20/2022 Allergies as of 10/26/2017  Review Complete On: 10/26/2017 By: Agustín Ray No Known Allergies Current Immunizations  Reviewed on 10/12/2017 Name Date Influenza Vaccine 9/12/2015 Tdap 12/20/2012 Not reviewed this visit You Were Diagnosed With   
  
 Codes Comments Adjustment disorder with mixed anxiety and depressed mood     ICD-10-CM: F43.23 
ICD-9-CM: 309.28 Vitals BP Pulse Temp Resp Height(growth percentile) Weight(growth percentile) 127/84 (!) 103 98 °F (36.7 °C) (Oral) 20 5' 8\" (1.727 m) 176 lb (79.8 kg) LMP SpO2 BMI OB Status Smoking Status 10/29/2014 97% 26.76 kg/m2 Hysterectomy Current Every Day Smoker Vitals History BMI and BSA Data Body Mass Index Body Surface Area  
 26.76 kg/m 2 1.96 m 2 Preferred Pharmacy Pharmacy Name Phone [id number] [place] [facility],[address] [facility] [phone] Your Updated Medication List  
  
   
This list is accurate as of: 10/26/17  3:46 PM.  Always use your most recent med list.  
  
  
  
  
 ALPRAZolam 0.5 mg tablet Commonly known as:  Laurtabatha Kaska Take 1/2 to 1 tab daily as needed for anxiety MDD: 1 tab  
  
 dicyclomine 20 mg tablet Commonly known as:  BENTYL Take 20 mg by mouth every six (6) hours. ergocalciferol 50,000 unit capsule Commonly known as:  VITAMIN D2 Take 1 Cap by mouth every seven (7) days. fexofenadine 180 mg tablet Commonly known as:  Seretha Lever Take 1 Tab by mouth daily. montelukast 10 mg tablet Commonly known as:  SINGULAIR Take 1 Tab by mouth daily. naproxen 500 mg tablet Commonly known as:  NAPROSYN Take 1 Tab by mouth every twelve (12) hours as needed. OTEZLA STARTER PO Take  by mouth. Prescriptions Printed Refills ALPRAZolam (XANAX) 0.5 mg tablet 0 Sig: Take 1/2 to 1 tab daily as needed for anxiety MDD: 1 tab Class: Print Prescriptions Sent to Pharmacy Refills  
 montelukast (SINGULAIR) 10 mg tablet 1 Sig: Take 1 Tab by mouth daily. Class: Normal  
 Pharmacy: [facility]/pharmacy [address] [place] [address],[address] #: [phone] Route: Oral  
  
To-Do List   
 11/30/2017 1:00 PM  
  Appointment with [address] [place] [address] [address] at [place] [address] ([phone]) [facility]! Dear Ramesh Gamez: Thank you for requesting a [employer] account. Our records indicate that you already have an active Meebler account. You can access your account anytime at [email or web address] Did you know that you can access your hospital and ER discharge instructions at any time in Meebler? You can also review all of your test results from your hospital stay or ER visit. Additional Information If you have questions, please visit the Frequently Asked Questions section of the Meebler website at [email or web address] Remember, Meebler is NOT to be used for urgent needs. For medical emergencies, dial 911. Now available from your iPhone and Android! Please provide this summary of care documentation to your next provider. Your primary care clinician is listed as Jacqueline Mak.  If you have any questions after today's visit, please call [phone].

## 2017-10-26 NOTE — PROGRESS NOTES
Patient: Katty Brown MRN: [id number]  SSN: [id number]    YOB: 1980  Age: 42 y.o. Sex: female      Date of Service: 10/26/2017   Provider: MARKUS Mayer         REASON FOR VISIT:   Chief Complaint   Patient presents with    Follow-up     pt presents for follow up for Anxiety     Medication Refill     Xanax         VITALS:   Visit Vitals    /84    Pulse (!) 103    Temp 98 °F (36.7 °C) (Oral)    Resp 20    Ht 5' 8\" (1.727 m)    Wt 176 lb (79.8 kg)    LMP 10/29/2014    SpO2 97%    BMI 26.76 kg/m2       MEDICATIONS:   Current Outpatient Prescriptions on File Prior to Visit   Medication Sig Dispense Refill    naproxen (NAPROSYN) 500 mg tablet Take 1 Tab by mouth every twelve (12) hours as needed. 180 Tab 0    montelukast (SINGULAIR) 10 mg tablet Take 1 Tab by mouth daily. 30 Tab 1    APREMILAST (OTEZLA STARTER PO) Take  by mouth.  dicyclomine (BENTYL) 20 mg tablet Take 20 mg by mouth every six (6) hours.  ALPRAZolam (XANAX) 0.5 mg tablet Take 1/2 to 1 tab daily as needed for anxiety MDD: 1 tab 30 Tab 0    fexofenadine (ALLEGRA) 180 mg tablet Take 1 Tab by mouth daily. 90 Tab 3    ergocalciferol (VITAMIN D2) 50,000 unit capsule Take 1 Cap by mouth every seven (7) days. 4 Cap 2     No current facility-administered medications on file prior to visit.          ALLERGIES:   No Known Allergies     ACTIVE MEDICAL PROBLEMS:  Patient Active Problem List   Diagnosis Code    Difficulty concentrating R41.840    Tobacco abuse Z72.0    Tobacco abuse counseling Z71.6    Fatigue R53.83    Allergic rhinitis, seasonal J30.2    Polycythemia D75.1    Adjustment disorder with mixed anxiety and depressed mood F43.23    Anxiety F41.9    Vitamin D deficiency E55.9        MEDICAL/SURGICAL HISTORY:  Past Medical History:   Diagnosis Date    Discoid lupus     skin lupus    Hypertension     during pregnancy/6 months after    IBS (irritable bowel syndrome) 2016    Menorrhagia     hysterectomy    Polycythemia       Past Surgical History:   Procedure Laterality Date    DELIVERY       HX DILATION AND CURETTAGE      HX GYN      laproscopy    HX HYSTERECTOMY          FAMILY HISTORY:  Family History   Problem Relation Age of Onset    Alcohol abuse Mother     Drug Abuse Mother    Gail Carr Arthritis-rheumatoid Mother     Hypertension Father     Hypertension Sister     High Cholesterol Sister     Dementia Sister     Stroke Sister     Arthritis-rheumatoid Maternal Grandmother     Alcohol abuse Maternal Grandfather     Arthritis-rheumatoid Maternal Grandfather     Other Maternal Grandfather      ruptured bowel    Stroke Sister         SOCIAL HISTORY:  Social History   Substance Use Topics    Smoking status: Current Every Day Smoker     Packs/day: 0.50     Years: 15.00     Types: Cigarettes    Smokeless tobacco: Never Used      Comment:     Alcohol use 0.0 oz/week     1 - 4 Glasses of wine per week      Comment: occ           HISTORY OF PRESENT ILLNESS: Delmy Almodovar is a 42 y.o. female who presents to the office for a routine follow up visit. Anxiety -   Doing well on Xanax PRN. Has 0.5 mg tablets, but reports more often than not, she only takes 1/2 tab as needed. Xanax use varies in frequency from once every 2-3 weeks, to once daily for 2-3 days at a time if she is feeling particularly stressed. She notes that anxiety seems to come in waves. She tends to experience heightened anxiety for a few days to a week at a time, and then symptoms will improve without any significant change in her life stressors. She is beginning to wonder if there may be an association with her hormones, as she no longer gets a menstrual period (s/p hysterectomy) to correlate symptoms. Symptoms include excessive worry/nervousness, chest tightness, heart palpitations.   She was recently referred to [facility] for counseling and potentially med management if she so chooses. Appointment is not until next month. REVIEW OF SYSTEMS:  Review of Systems   Constitutional: Negative for chills and fever. Respiratory: Negative for cough, shortness of breath and wheezing. Cardiovascular: Positive for chest pain and palpitations. Neurological: Negative for dizziness and headaches. Psychiatric/Behavioral: The patient is nervous/anxious. PHYSICAL EXAMINATION:  Physical Exam   Constitutional: She is well-developed, well-nourished, and in no distress. Skin: Skin is warm and dry. Psychiatric: Mood, memory and affect normal.        RESULTS:  No results found for this visit on 10/26/17. ASSESSMENT/PLAN:  Diagnoses and all orders for this visit:    1. Anxiety  -  reviewed and appropriate, patient has been using Xanax sparingly as directed   - Xanax refilled today   -     ALPRAZolam (XANAX) 0.5 mg tablet; Take 1/2 to 1 tab daily as needed for anxiety MDD: 1 tab    2. Chronic seasonal allergic rhinitis due to pollen  - Refill provided at patient's request   -     montelukast (SINGULAIR) 10 mg tablet; Take 1 Tab by mouth daily.       A total of 15 minutes was spent with the patient, of which >50% was spent in counseling/coordinating care regarding anxiety symptoms and management, psych referral.      MARKUS Carcamo   October 26, 2017    4:14 PM

## 2017-11-30 ENCOUNTER — APPOINTMENT (OUTPATIENT)
Dept: INFUSION THERAPY | Age: 37
End: 2017-11-30

## 2017-12-05 ENCOUNTER — TELEPHONE (OUTPATIENT)
Dept: FAMILY MEDICINE CLINIC | Age: 37
End: 2017-12-05

## 2017-12-06 ENCOUNTER — TELEPHONE (OUTPATIENT)
Dept: FAMILY MEDICINE CLINIC | Age: 37
End: 2017-12-06

## 2017-12-06 ENCOUNTER — OFFICE VISIT (OUTPATIENT)
Dept: FAMILY MEDICINE CLINIC | Age: 37
End: 2017-12-06

## 2017-12-06 VITALS
WEIGHT: 173 LBS | HEART RATE: 99 BPM | HEIGHT: 68 IN | OXYGEN SATURATION: 98 % | DIASTOLIC BLOOD PRESSURE: 85 MMHG | SYSTOLIC BLOOD PRESSURE: 138 MMHG | RESPIRATION RATE: 18 BRPM | BODY MASS INDEX: 26.22 KG/M2 | TEMPERATURE: 98.2 F

## 2017-12-06 DIAGNOSIS — H65.93 OTITIS MEDIA WITH EFFUSION, BILATERAL: ICD-10-CM

## 2017-12-06 DIAGNOSIS — J01.90 ACUTE BACTERIAL SINUSITIS: Primary | ICD-10-CM

## 2017-12-06 DIAGNOSIS — B96.89 ACUTE BACTERIAL SINUSITIS: Primary | ICD-10-CM

## 2017-12-06 RX ORDER — AMOXICILLIN 500 MG/1
500 CAPSULE ORAL 2 TIMES DAILY
Qty: 10 CAP | Refills: 0 | Status: SHIPPED | OUTPATIENT
Start: 2017-12-06 | End: 2017-12-13

## 2017-12-06 NOTE — MR AVS SNAPSHOT
Visit Information Date & Time Provider Department Dept. Phone Encounter #  
 12/6/2017 10:30 AM Lorice Galeazzi, [address] [id number] Follow-up Instructions Return if symptoms worsen or fail to improve. Your Appointments 12/6/2017 10:30 AM  
SAME DAY with Lorice Galeazzi, PA [facility] ([address]) Appt Note: COUGH AND CONGESTION  
 [address] [facility] [address] [address]  
  
    
 1/26/2018 10:15 AM  
Office Visit with MD Jolene Garciagriffin Chaparro ([address]) Appt Note: 4 mo fu  
 [facility] [address] [facility] [address]  
[phone]  
  
   
 [facility] [address] Upcoming Health Maintenance Date Due Pneumococcal 19-64 Medium Risk (1 of 1 - PPSV23) 5/30/1997 PAP AKA CERVICAL CYTOLOGY 6/13/2020 DTaP/Tdap/Td series (2 - Td) 12/20/2022 Allergies as of 12/6/2017  Review Complete On: 12/6/2017 By: Lorice Galeazzi, PA No Known Allergies Current Immunizations  Reviewed on 10/12/2017 Name Date Influenza Vaccine 9/12/2015 Tdap 12/20/2012 Not reviewed this visit You Were Diagnosed With   
  
 Codes Comments Acute bacterial sinusitis    -  Primary ICD-10-CM: J01.90, B96.89 
ICD-9-CM: 461.9 Otitis media with effusion, bilateral     ICD-10-CM: H65.93 
ICD-9-CM: 381. 4 Vitals BP Pulse Temp Resp Height(growth percentile) Weight(growth percentile) 138/85 99 98.2 °F (36.8 °C) (Oral) 18 5' 8\" (1.727 m) 173 lb (78.5 kg) LMP SpO2 BMI OB Status Smoking Status 10/29/2014 98% 26.3 kg/m2 Hysterectomy Current Every Day Smoker Vitals History BMI and BSA Data Body Mass Index Body Surface Area  
 26.3 kg/m 2 1.94 m 2 Preferred Pharmacy Pharmacy Name Phone [facility] #[id number] [place], [address] [facility] [facility] [phone] Your Updated Medication List  
  
   
This list is accurate as of: 12/6/17 10:21 AM.  Always use your most recent med list.  
  
  
  
  
 ALPRAZolam 0.5 mg tablet Commonly known as:  Griggs Amend Take 1/2 to 1 tab daily as needed for anxiety MDD: 1 tab  
  
 amoxicillin 500 mg capsule Commonly known as:  AMOXIL Take 1 Cap by mouth two (2) times a day. dicyclomine 20 mg tablet Commonly known as:  BENTYL Take 20 mg by mouth every six (6) hours. ergocalciferol 50,000 unit capsule Commonly known as:  VITAMIN D2 Take 1 Cap by mouth every seven (7) days. fexofenadine 180 mg tablet Commonly known as:  Ltanya Cochran Take 1 Tab by mouth daily. montelukast 10 mg tablet Commonly known as:  SINGULAIR Take 1 Tab by mouth daily. naproxen 500 mg tablet Commonly known as:  NAPROSYN Take 1 Tab by mouth every twelve (12) hours as needed. OTEZLA STARTER PO Take  by mouth. Prescriptions Sent to Pharmacy Refills  
 amoxicillin (AMOXIL) 500 mg capsule 0 Sig: Take 1 Cap by mouth two (2) times a day. Class: Normal  
 Pharmacy: [facility] [id number] [facility], [address] [facility] #: [phone] Route: Oral  
  
Follow-up Instructions Return if symptoms worsen or fail to improve. To-Do List   
 12/08/2017  3:00 PM  
  Appointment with [address] [place] [address] [facility] at [place] [address] ([phone]) Patient Instructions Sinusitis: Care Instructions Your Care Instructions Sinusitis is an infection of the lining of the sinus cavities in your head. Sinusitis often follows a cold. It causes pain and pressure in your head and face. In most cases, sinusitis gets better on its own in 1 to 2 weeks. But some mild symptoms may last for several weeks. Sometimes antibiotics are needed. Follow-up care is a key part of your treatment and safety. Be sure to make and go to all appointments, and call your doctor if you are having problems. It's also a good idea to know your test results and keep a list of the medicines you take. How can you care for yourself at home? · Take an over-the-counter pain medicine, such as acetaminophen (Tylenol), ibuprofen (Advil, Motrin), or naproxen (Aleve). Read and follow all instructions on the label. · If the doctor prescribed antibiotics, take them as directed. Do not stop taking them just because you feel better. You need to take the full course of antibiotics. · Be careful when taking over-the-counter cold or flu medicines and Tylenol at the same time. Many of these medicines have acetaminophen, which is Tylenol. Read the labels to make sure that you are not taking more than the recommended dose. Too much acetaminophen (Tylenol) can be harmful. · Breathe warm, moist air from a steamy shower, a hot bath, or a sink filled with hot water. Avoid cold, dry air. Using a humidifier in your home may help. Follow the directions for cleaning the machine. · Use saline (saltwater) nasal washes to help keep your nasal passages open and wash out mucus and bacteria. You can buy saline nose drops at a grocery store or drugstore. Or you can make your own at home by adding 1 teaspoon of salt and 1 teaspoon of baking soda to 2 cups of distilled water. If you make your own, fill a bulb syringe with the solution, insert the tip into your nostril, and squeeze gently. Ozzy Diane your nose. · Put a hot, wet towel or a warm gel pack on your face 3 or 4 times a day for 5 to 10 minutes each time. · Try a decongestant nasal spray like oxymetazoline (Afrin). Do not use it for more than 3 days in a row. Using it for more than 3 days can make your congestion worse. When should you call for help? Call your doctor now or seek immediate medical care if: ? · You have new or worse swelling or redness in your face or around your eyes. ? · You have a new or higher fever. ? Watch closely for changes in your health, and be sure to contact your doctor if: 
? · You have new or worse facial pain. ? · The mucus from your nose becomes thicker (like pus) or has new blood in it. ? · You are not getting better as expected. Where can you learn more? Go to http://navi-zofia.info/. Enter P553 in the search box to learn more about \"Sinusitis: Care Instructions. \" Current as of: May 12, 2017 Content Version: 11.4 © 8537-3597 [employer]. Care instructions adapted under license by [employer] (which disclaims liability or warranty for this information). If you have questions about a medical condition or this instruction, always ask your healthcare professional. [address] any warranty or liability for your use of this information. Introducing [facility] & HEALTH SERVICES! Dear Crescencio Valdivia: Thank you for requesting a Hivel[employer] account. Our records indicate that you already have an active Hivel[employer] account. You can access your account anytime at [email or web address] Did you know that you can access your hospital and ER discharge instructions at any time in Hivelocity? You can also review all of your test results from your hospital stay or ER visit. Additional Information If you have questions, please visit the Frequently Asked Questions section of the [employer] website at [email or web address] Remember, Hivel[employer] is NOT to be used for urgent needs. For medical emergencies, dial 911. Now available from your iPhone and Android! Please provide this summary of care documentation to your next provider. Your primary care clinician is listed as Calixto Brown. If you have any questions after today's visit, please call [phone].

## 2017-12-06 NOTE — TELEPHONE ENCOUNTER
Patient called this morning. She states she failed to mention to Garfield Salinas that she saw [facility] last week. She states Dr.Tiffany Brionna Olsen prescribed prozac and xanax 0.5 mg to her. Please call patient with any questions.

## 2017-12-06 NOTE — PROGRESS NOTES
Patient: Damien Mendoza MRN: [id number]  SSN: [id number]    YOB: 1980  Age: 42 y.o. Sex: female      Date of Service: 12/6/2017   Provider: MARKUS Kilgore   Office Location:   [address] Guillaume Gu [facility], [address] [place] [address], [address]  Office Phone: [phone]  Office Fax: [phone]        REASON FOR VISIT:   Chief Complaint   Patient presents with    Nasal Congestion     pt presents for congestion and cough pt states \" that pt has had this for about 1 week pt has been using Singulair     Ear Pain     pt presents for left ear pain as well         VITALS:   Visit Vitals    /85    Pulse 99    Temp 98.2 °F (36.8 °C) (Oral)    Resp 18    Ht 5' 8\" (1.727 m)    Wt 173 lb (78.5 kg)    LMP 10/29/2014    SpO2 98%    BMI 26.3 kg/m2       MEDICATIONS:   Current Outpatient Prescriptions   Medication Sig Dispense Refill    ALPRAZolam (XANAX) 0.5 mg tablet Take 1/2 to 1 tab daily as needed for anxiety MDD: 1 tab 30 Tab 0    montelukast (SINGULAIR) 10 mg tablet Take 1 Tab by mouth daily. 30 Tab 1    naproxen (NAPROSYN) 500 mg tablet Take 1 Tab by mouth every twelve (12) hours as needed. 180 Tab 0    dicyclomine (BENTYL) 20 mg tablet Take 20 mg by mouth every six (6) hours.  fexofenadine (ALLEGRA) 180 mg tablet Take 1 Tab by mouth daily. 90 Tab 3    APREMILAST (OTEZLA STARTER PO) Take  by mouth.  ergocalciferol (VITAMIN D2) 50,000 unit capsule Take 1 Cap by mouth every seven (7) days. 4 Cap 2        ALLERGIES:   No Known Allergies     ACTIVE MEDICAL PROBLEMS:  Patient Active Problem List   Diagnosis Code    Tobacco abuse Z72.0    Allergic rhinitis, seasonal J30.2    Polycythemia D75.1    Anxiety F41.9    Vitamin D deficiency E55.9          HISTORY OF PRESENT ILLNESS:   Damien Mendoza is a 42 y.o. female who presents to the office for acute care.     Patient complains of persistent and progressively worsening nasal congestion, sinus pressure, and pain in b/l ears x about 1 week. Also with scratchy throat and dry cough. She has been taking her regular allergy medications with minimal relief. Admits to feeling tired, achy, and feverish, but no true fevers. Denies chest pain, SOB, wheezing, n/v/d    REVIEW OF SYSTEMS:  Review of Systems   Constitutional: Positive for malaise/fatigue. Negative for chills and fever. HENT: Positive for congestion, ear pain and sinus pain. Negative for ear discharge. Eyes: Negative for pain, discharge and redness. Respiratory: Positive for cough. Negative for hemoptysis, sputum production, shortness of breath, wheezing and stridor. Cardiovascular: Negative for chest pain, palpitations and leg swelling. Gastrointestinal: Negative for diarrhea, nausea and vomiting. PHYSICAL EXAMINATION:  Physical Exam   Constitutional: She is well-developed, well-nourished, and in no distress. HENT:   Head: Normocephalic and atraumatic. Right Ear: External ear and ear canal normal. No drainage. Tympanic membrane is not erythematous and not bulging. A middle ear effusion is present. Left Ear: External ear and ear canal normal. No drainage. Tympanic membrane is erythematous. Tympanic membrane is not bulging. A middle ear effusion is present. Nose: Mucosal edema present. No rhinorrhea. Right sinus exhibits maxillary sinus tenderness. Right sinus exhibits no frontal sinus tenderness. Left sinus exhibits maxillary sinus tenderness. Left sinus exhibits no frontal sinus tenderness. Mouth/Throat: Uvula is midline, oropharynx is clear and moist and mucous membranes are normal.   Neck: Neck supple. Cardiovascular: Normal rate, regular rhythm and normal heart sounds. Exam reveals no gallop and no friction rub. No murmur heard. Pulmonary/Chest: Effort normal and breath sounds normal. She has no wheezes. She has no rales. Lymphadenopathy:     She has cervical adenopathy ( anterior cervical).    Skin: Skin is warm and dry. No rash noted. RESULTS:  No results found for this visit on 12/06/17. ASSESSMENT/PLAN:  Diagnoses and all orders for this visit:    1. Acute bacterial sinusitis  2. Otitis media with effusion, bilateral  - Ear pain/effusion likely secondary to acute sinusitis  - Given duration of symptoms and relatively lack of improvement with supportive measures, will try abx as below  - Encouraged rest, hydration with clear liquids, and OTC Mucinex +/- decongestants  Orders:    -     amoxicillin (AMOXIL) 500 mg capsule; Take 1 Cap by mouth two (2) times a day. Follow up as scheduled or sooner as needed if symptoms persist or worsen    Patient expresses understanding and agrees to the above plan.       PATIENT CARE TEAM:   Patient Care Team:  MARKUS Evans as PCP - Stefania Camarillo MD (Obstetrics & Gynecology)  Phi Antonio MD (Orthopedic Surgery)  Jada Silveira MD (Surgical Oncology)  Josefina Jimenez MD as Physician (Dermatology)       Nigel Sam, [place]   December 6, 2017    10:25 AM

## 2017-12-06 NOTE — PATIENT INSTRUCTIONS
Sinusitis: Care Instructions  Your Care Instructions    Sinusitis is an infection of the lining of the sinus cavities in your head. Sinusitis often follows a cold. It causes pain and pressure in your head and face. In most cases, sinusitis gets better on its own in 1 to 2 weeks. But some mild symptoms may last for several weeks. Sometimes antibiotics are needed. Follow-up care is a key part of your treatment and safety. Be sure to make and go to all appointments, and call your doctor if you are having problems. It's also a good idea to know your test results and keep a list of the medicines you take. How can you care for yourself at home? · Take an over-the-counter pain medicine, such as acetaminophen (Tylenol), ibuprofen (Advil, Motrin), or naproxen (Aleve). Read and follow all instructions on the label. · If the doctor prescribed antibiotics, take them as directed. Do not stop taking them just because you feel better. You need to take the full course of antibiotics. · Be careful when taking over-the-counter cold or flu medicines and Tylenol at the same time. Many of these medicines have acetaminophen, which is Tylenol. Read the labels to make sure that you are not taking more than the recommended dose. Too much acetaminophen (Tylenol) can be harmful. · Breathe warm, moist air from a steamy shower, a hot bath, or a sink filled with hot water. Avoid cold, dry air. Using a humidifier in your home may help. Follow the directions for cleaning the machine. · Use saline (saltwater) nasal washes to help keep your nasal passages open and wash out mucus and bacteria. You can buy saline nose drops at a grocery store or drugstore. Or you can make your own at home by adding 1 teaspoon of salt and 1 teaspoon of baking soda to 2 cups of distilled water. If you make your own, fill a bulb syringe with the solution, insert the tip into your nostril, and squeeze gently. Lorrin Fraction your nose.   · Put a hot, wet towel or a warm gel pack on your face 3 or 4 times a day for 5 to 10 minutes each time. · Try a decongestant nasal spray like oxymetazoline (Afrin). Do not use it for more than 3 days in a row. Using it for more than 3 days can make your congestion worse. When should you call for help? Call your doctor now or seek immediate medical care if:  ? · You have new or worse swelling or redness in your face or around your eyes. ? · You have a new or higher fever. ? Watch closely for changes in your health, and be sure to contact your doctor if:  ? · You have new or worse facial pain. ? · The mucus from your nose becomes thicker (like pus) or has new blood in it. ? · You are not getting better as expected. Where can you learn more? Go to http://navi-zofia.info/. Enter Q552 in the search box to learn more about \"Sinusitis: Care Instructions. \"  Current as of: May 12, 2017  Content Version: 11.4  © 7873-9385 [employer]. Care instructions adapted under license by [employer] (which disclaims liability or warranty for this information). If you have questions about a medical condition or this instruction, always ask your healthcare professional. [address] any warranty or liability for your use of this information.

## 2017-12-07 ENCOUNTER — HOSPITAL ENCOUNTER (OUTPATIENT)
Dept: INFUSION THERAPY | Age: 37
End: 2017-12-07

## 2017-12-11 ENCOUNTER — TELEPHONE (OUTPATIENT)
Dept: FAMILY MEDICINE CLINIC | Age: 37
End: 2017-12-11

## 2017-12-11 DIAGNOSIS — B96.89 ACUTE BACTERIAL SINUSITIS: Primary | ICD-10-CM

## 2017-12-11 DIAGNOSIS — J01.90 ACUTE BACTERIAL SINUSITIS: Primary | ICD-10-CM

## 2017-12-11 RX ORDER — LEVOFLOXACIN 500 MG/1
500 TABLET, FILM COATED ORAL DAILY
Qty: 7 TAB | Refills: 0 | Status: SHIPPED | OUTPATIENT
Start: 2017-12-11 | End: 2017-12-13 | Stop reason: SINTOL

## 2017-12-11 RX ORDER — FLUCONAZOLE 150 MG/1
150 TABLET ORAL DAILY
Qty: 1 TAB | Refills: 0 | Status: SHIPPED | OUTPATIENT
Start: 2017-12-11 | End: 2017-12-12

## 2017-12-11 NOTE — TELEPHONE ENCOUNTER
Shawna Harrell will send a stronger antibiotic (Levaquin) to her pharmacy  If no improvement by the end of this week, she will need to be re-evaluated. thanks!

## 2017-12-11 NOTE — TELEPHONE ENCOUNTER
Pt states that she has finished all the amoxicillin and states that she is not feeling any better. Please advise.

## 2017-12-11 NOTE — TELEPHONE ENCOUNTER
Pt verbalized understanding and wants you to send in Diflucan as well since she has already been on an Antibiotic.

## 2017-12-14 NOTE — PROGRESS NOTES
Ms. Pramod Dyson called the [facility] today, Thursday, 12-14-17, and stated that she will be unable to come for her CBC/Phlebotomy appointment that she had rescheduled for next Thursday, 12-21-17, because she has no  for her children, and asked to reschedule the appointment to the first week in January. Therefore, she was made aware, that the appointment has been rescheduled to Thursday, 1-4-18, at 1400, and she agreed to the appointment.

## 2017-12-21 ENCOUNTER — HOSPITAL ENCOUNTER (OUTPATIENT)
Dept: INFUSION THERAPY | Age: 37
Discharge: HOME | End: 2017-12-21

## 2018-01-04 ENCOUNTER — HOSPITAL ENCOUNTER (OUTPATIENT)
Dept: INFUSION THERAPY | Age: 38
End: 2018-01-04
Payer: COMMERCIAL

## 2018-01-11 ENCOUNTER — CLINICAL SUPPORT (OUTPATIENT)
Dept: ONCOLOGY | Age: 38
End: 2018-01-11

## 2018-01-11 ENCOUNTER — HOSPITAL ENCOUNTER (OUTPATIENT)
Dept: ONCOLOGY | Age: 38
Discharge: HOME | End: 2018-01-11

## 2018-01-11 ENCOUNTER — HOSPITAL ENCOUNTER (OUTPATIENT)
Dept: INFUSION THERAPY | Age: 38
Discharge: HOME | End: 2018-01-11
Payer: COMMERCIAL

## 2018-01-11 VITALS
TEMPERATURE: 97 F | RESPIRATION RATE: 16 BRPM | SYSTOLIC BLOOD PRESSURE: 139 MMHG | HEART RATE: 80 BPM | DIASTOLIC BLOOD PRESSURE: 82 MMHG

## 2018-01-11 DIAGNOSIS — D75.1 POLYCYTHEMIA, SECONDARY: ICD-10-CM

## 2018-01-11 DIAGNOSIS — D75.1 POLYCYTHEMIA, SECONDARY: Primary | ICD-10-CM

## 2018-01-11 LAB
BASO+EOS+MONOS # BLD AUTO: 0.9 K/UL (ref 0–2.3)
BASO+EOS+MONOS # BLD AUTO: 16 % (ref 0.1–17)
DIFFERENTIAL METHOD BLD: NORMAL
ERYTHROCYTE [DISTWIDTH] IN BLOOD BY AUTOMATED COUNT: 12.4 % (ref 11.5–14.5)
HCT VFR BLD AUTO: 40.8 % (ref 36–48)
HGB BLD-MCNC: 14.2 G/DL (ref 12–16)
LYMPHOCYTES # BLD: 1.7 K/UL (ref 1.1–5.9)
LYMPHOCYTES NFR BLD: 32 % (ref 14–44)
MCH RBC QN AUTO: 32.4 PG (ref 25–35)
MCHC RBC AUTO-ENTMCNC: 34.8 G/DL (ref 31–37)
MCV RBC AUTO: 93.2 FL (ref 78–102)
NEUTS SEG # BLD: 2.7 K/UL (ref 1.8–9.5)
NEUTS SEG NFR BLD: 52 % (ref 40–70)
PLATELET # BLD AUTO: 143 K/UL (ref 140–440)
RBC # BLD AUTO: 4.38 M/UL (ref 4.1–5.1)
WBC # BLD AUTO: 5.3 K/UL (ref 4.5–13)

## 2018-01-11 PROCEDURE — 36415 COLL VENOUS BLD VENIPUNCTURE: CPT

## 2018-01-11 NOTE — PROGRESS NOTES
[facility] Progress Note    Date: 2018    Name: Migue Del Castillo    MRN: [id number]         : 1980      Ms. Kostas Cagle arrived in the [facility] today, at 97 70 84, in stable condition, here for CBC/Phlebotomy. She was assessed and education was provided. Ms. Karely Cardozo vitals were reviewed. Visit Vitals    /82 (BP 1 Location: Right arm, BP Patient Position: At rest;Sitting)    Pulse 80    Temp 97 °F (36.1 °C)    Resp 16    Breastfeeding No           CBC was drawn from her right AC at 1518, without incident. Lab results were obtained and reviewed. Recent Results (from the past 12 hour(s))   CBC WITH 3 PART DIFF    Collection Time: 18  3:18 PM   Result Value Ref Range    WBC 5.3 4.5 - 13.0 K/uL    RBC 4.38 4.10 - 5.10 M/uL    HGB 14.2 12.0 - 16.0 g/dL    HCT 40.8 36 - 48 %    MCV 93.2 78 - 102 FL    MCH 32.4 25.0 - 35.0 PG    MCHC 34.8 31 - 37 g/dL    RDW 12.4 11.5 - 14.5 %    PLATELET 252 413 - 435 K/uL    NEUTROPHILS 52 40 - 70 %    MIXED CELLS 16 0.1 - 17 %    LYMPHOCYTES 32 14 - 44 %    ABS. NEUTROPHILS 2.7 1.8 - 9.5 K/UL    ABS. MIXED CELLS 0.9 0.0 - 2.3 K/uL    ABS. LYMPHOCYTES 1.7 1.1 - 5.9 K/UL    DF AUTOMATED             Phlebotomy was HELD today per order, for HCT < 45.0. Ms. Kostas Cagle tolerated well, and had no complaints. Ms. Kostas Cagle was discharged from [place] [address] in stable condition at 1510. Cynthia Dao She is to return in 6 weeks, on Thursday, 18, at 1500,  for her next appointment, for CBC/Phlebotomy.      Eran Gonzalez RN  2018  3:33 PM

## 2018-01-26 ENCOUNTER — HOSPITAL ENCOUNTER (OUTPATIENT)
Dept: ONCOLOGY | Age: 38
Discharge: HOME | End: 2018-01-26

## 2018-01-26 ENCOUNTER — HOSPITAL ENCOUNTER (OUTPATIENT)
Dept: LAB | Age: 38
Discharge: HOME | End: 2018-01-26
Payer: COMMERCIAL

## 2018-01-26 ENCOUNTER — OFFICE VISIT (OUTPATIENT)
Dept: ONCOLOGY | Age: 38
End: 2018-01-26

## 2018-01-26 VITALS
WEIGHT: 176 LBS | TEMPERATURE: 98.5 F | BODY MASS INDEX: 26.76 KG/M2 | SYSTOLIC BLOOD PRESSURE: 119 MMHG | DIASTOLIC BLOOD PRESSURE: 81 MMHG | HEART RATE: 81 BPM

## 2018-01-26 DIAGNOSIS — E55.9 VITAMIN D DEFICIENCY: ICD-10-CM

## 2018-01-26 DIAGNOSIS — D75.1 POLYCYTHEMIA: Primary | ICD-10-CM

## 2018-01-26 DIAGNOSIS — D75.1 POLYCYTHEMIA: ICD-10-CM

## 2018-01-26 DIAGNOSIS — F41.9 ANXIETY: ICD-10-CM

## 2018-01-26 LAB
ALBUMIN SERPL-MCNC: 3.9 G/DL (ref 3.4–5)
ALBUMIN/GLOB SERPL: 1.3 {RATIO} (ref 0.8–1.7)
ALP SERPL-CCNC: 76 U/L (ref 45–117)
ALT SERPL-CCNC: 15 U/L (ref 13–56)
ANION GAP SERPL CALC-SCNC: 7 MMOL/L (ref 3–18)
AST SERPL-CCNC: 16 U/L (ref 15–37)
BASO+EOS+MONOS # BLD AUTO: 0.4 K/UL (ref 0–2.3)
BASO+EOS+MONOS # BLD AUTO: 7 % (ref 0.1–17)
BILIRUB SERPL-MCNC: 0.5 MG/DL (ref 0.2–1)
BUN SERPL-MCNC: 8 MG/DL (ref 7–18)
BUN/CREAT SERPL: 11 (ref 12–20)
CALCIUM SERPL-MCNC: 8.8 MG/DL (ref 8.5–10.1)
CHLORIDE SERPL-SCNC: 102 MMOL/L (ref 100–108)
CO2 SERPL-SCNC: 27 MMOL/L (ref 21–32)
CREAT SERPL-MCNC: 0.7 MG/DL (ref 0.6–1.3)
DIFFERENTIAL METHOD BLD: NORMAL
ERYTHROCYTE [DISTWIDTH] IN BLOOD BY AUTOMATED COUNT: 12.1 % (ref 11.5–14.5)
FERRITIN SERPL-MCNC: 29 NG/ML (ref 8–388)
GLOBULIN SER CALC-MCNC: 3 G/DL (ref 2–4)
GLUCOSE SERPL-MCNC: 87 MG/DL (ref 74–99)
HCT VFR BLD AUTO: 42.6 % (ref 36–48)
HGB BLD-MCNC: 14.8 G/DL (ref 12–16)
IRON SATN MFR SERPL: 36 %
IRON SERPL-MCNC: 124 UG/DL (ref 50–175)
LYMPHOCYTES # BLD: 1.3 K/UL (ref 1.1–5.9)
LYMPHOCYTES NFR BLD: 24 % (ref 14–44)
MCH RBC QN AUTO: 32.3 PG (ref 25–35)
MCHC RBC AUTO-ENTMCNC: 34.7 G/DL (ref 31–37)
MCV RBC AUTO: 93 FL (ref 78–102)
NEUTS SEG # BLD: 3.8 K/UL (ref 1.8–9.5)
NEUTS SEG NFR BLD: 69 % (ref 40–70)
PLATELET # BLD AUTO: 174 K/UL (ref 140–440)
POTASSIUM SERPL-SCNC: 4.2 MMOL/L (ref 3.5–5.5)
PROT SERPL-MCNC: 6.9 G/DL (ref 6.4–8.2)
RBC # BLD AUTO: 4.58 M/UL (ref 4.1–5.1)
SODIUM SERPL-SCNC: 136 MMOL/L (ref 136–145)
TIBC SERPL-MCNC: 347 UG/DL (ref 250–450)
WBC # BLD AUTO: 5.5 K/UL (ref 4.5–13)

## 2018-01-26 PROCEDURE — 83540 ASSAY OF IRON: CPT | Performed by: NURSE PRACTITIONER

## 2018-01-26 PROCEDURE — 82306 VITAMIN D 25 HYDROXY: CPT | Performed by: NURSE PRACTITIONER

## 2018-01-26 PROCEDURE — 82728 ASSAY OF FERRITIN: CPT | Performed by: NURSE PRACTITIONER

## 2018-01-26 PROCEDURE — 36415 COLL VENOUS BLD VENIPUNCTURE: CPT | Performed by: NURSE PRACTITIONER

## 2018-01-26 PROCEDURE — 80053 COMPREHEN METABOLIC PANEL: CPT | Performed by: NURSE PRACTITIONER

## 2018-01-26 NOTE — MR AVS SNAPSHOT
Christa Valentin 
 
 
 [facility] [address] [facility] [address] 
[phone] Patient: Oscar Samuels MRN: [id number] MSE:[id number] Visit Information Date & Time Provider Department Dept. Phone Encounter #  
 1/26/2018 10:15 AM Matteo Rodriguez [facility] Office [phone] [id number] Follow-up Instructions Return in about 4 months (around 5/26/2018). Your Appointments 1/30/2018  9:30 AM  
New Patient with Xenia Platt MD  
[address] [place] [address], [address] [facility] [address] [place] [address]) Appt Note: left cubital tunnel 340 [facility], [facility] 1 83 [facility]  
[phone]  
  
   
 340 [facility], [address] [place] [address] Road [address]  
  
    
 6/1/2018 10:15 AM  
Office Visit with Reinaldo Cedeno MD  
[facility] ([facility]) Appt Note: 4 mo fu  
 [facility] [address] [facility] [address]  
[phone]  
  
   
 [facility] [address] Upcoming Health Maintenance Date Due Pneumococcal 19-64 Medium Risk (1 of 1 - PPSV23) 5/30/1997 PAP AKA CERVICAL CYTOLOGY 6/13/2020 DTaP/Tdap/Td series (2 - Td) 12/20/2022 Allergies as of 1/26/2018  Review Complete On: 1/11/2018 By: Scott Jeffers RN No Known Allergies Current Immunizations  Reviewed on 1/11/2018 Name Date Influenza Vaccine 1/18/2018, 9/12/2015 Tdap 12/20/2012 Not reviewed this visit You Were Diagnosed With   
  
 Codes Comments Polycythemia    -  Primary ICD-10-CM: D75.1 ICD-9-CM: 238.4 Anxiety     ICD-10-CM: F41.9 ICD-9-CM: 300.00 Vitamin D deficiency     ICD-10-CM: E55.9 ICD-9-CM: 268.9 Vitals BP Pulse Temp Weight(growth percentile) LMP BMI  
 119/81 81 98.5 °F (36.9 °C) 176 lb (79.8 kg) 10/29/2014 26.76 kg/m2 OB Status Smoking Status Hysterectomy Current Every Day Smoker BMI and BSA Data Body Mass Index Body Surface Area  
 26.76 kg/m 2 1.96 m 2 Preferred Pharmacy Pharmacy Name Phone [id number] Devante Cano, [address] [facility] [facility] [phone] Your Updated Medication List  
  
   
This list is accurate as of: 1/26/18 11:19 AM.  Always use your most recent med list.  
  
  
  
  
 ALPRAZolam 0.5 mg tablet Commonly known as:  Keven Smaller Take 1/2 to 1 tab daily as needed for anxiety MDD: 1 tab  
  
 dicyclomine 20 mg tablet Commonly known as:  BENTYL Take 20 mg by mouth every six (6) hours. ergocalciferol 50,000 unit capsule Commonly known as:  VITAMIN D2 Take 1 Cap by mouth every seven (7) days. fexofenadine 180 mg tablet Commonly known as:  Erminio Craft Take 1 Tab by mouth daily. montelukast 10 mg tablet Commonly known as:  SINGULAIR Take 1 Tab by mouth daily. naproxen 500 mg tablet Commonly known as:  NAPROSYN Take 1 Tab by mouth every twelve (12) hours as needed. OTEZLA STARTER PO Take  by mouth. We Performed the Following COMPLETE CBC & AUTO DIFF WBC [36915 CPT(R)] Follow-up Instructions Return in about 4 months (around 5/26/2018). To-Do List   
 01/26/2018 Lab:  CBC WITH 3 PART DIFF   
  
 02/22/2018 3:00 PM  
  Appointment with [address] at [place] [address] ([phone]) Patient Instructions Learning About Vitamin D Why is it important to get enough vitamin D? Your body needs vitamin D to absorb calcium. Calcium keeps your bones and muscles, including your heart, healthy and strong. If your muscles don't get enough calcium, they can cramp, hurt, or feel weak. You may have long-term (chronic) muscle aches and pains. If you don't get enough vitamin D throughout life, you have an increased chance of having thin and brittle bones (osteoporosis) in your later years.  Children who don't get enough vitamin D may not grow as much as others their age. They also have a chance of getting a rare disease called rickets. It causes weak bones. Vitamin D and calcium are added to many foods. And your body uses sunshine to make its own vitamin D. How much vitamin D do you need? The [place] of Medicine recommends that people ages 3 through 79 get 600 IU (international units) every day. Adults 71 and older need 800 IU every day. Blood tests for vitamin D can check your vitamin D level. But there is no standard normal range used by all laboratories. The [place] of Medicine recommends a blood level of 20 ng/mL of vitamin D for healthy bones. And most people in the [place] and [facility] ([facility]) meet this goal. 
How can you get more vitamin D? Foods that contain vitamin D include: 
· [place], tuna, and mackerel. These are some of the best foods to eat when you need to get more vitamin D. 
· Cheese, egg yolks, and beef liver. These foods have vitamin D in small amounts. · Milk, soy drinks, orange juice, yogurt, margarine, and some kinds of cereal have vitamin D added to them. Some people don't make vitamin D as well as others. They may have to take extra care in getting enough vitamin D. Things that reduce how much vitamin D your body makes include: · Dark skin, such as many  Americans have. · Age, especially if you are older than 72. · Digestive problems, such as Crohn's or celiac disease. · Liver and kidney disease. Some people who do not get enough vitamin D may need supplements. Are there any risks from taking vitamin D? 
· Too much vitamin D: 
¨ Can damage your kidneys. ¨ Can cause nausea and vomiting, constipation, and weakness. ¨ Raises the amount of calcium in your blood. If this happens, you can get confused or have an irregular heart rhythm. · Vitamin D may interact with other medicines.  Tell your doctor about all of the medicines you take, including over-the-counter drugs, herbs, and pills. Tell your doctor about all of your current medical problems. Where can you learn more? Go to http://navi-zofia.info/. Enter [id number] in the search box to learn more about \"Learning About Vitamin D.\" 
Current as of: May 12, 2017 Content Version: 11.4 © 9514-6350 [employer]. Care instructions adapted under license by [employer] (which disclaims liability or warranty for this information). If you have questions about a medical condition or this instruction, always ask your healthcare professional. [address] any warranty or liability for your use of this information. Introducing [facility]! Dear Mann Gomez: Thank you for requesting a Runner account. Our records indicate that you already have an active Runner account. You can access your account anytime at [email or web address] Did you know that you can access your hospital and ER discharge instructions at any time in Runner? You can also review all of your test results from your hospital stay or ER visit. Additional Information If you have questions, please visit the Frequently Asked Questions section of the Runner website at [email or web address] Remember, Runner is NOT to be used for urgent needs. For medical emergencies, dial 911. Now available from your iPhone and Android! Please provide this summary of care documentation to your next provider. Your primary care clinician is listed as Calvin Fowler. If you have any questions after today's visit, please call [phone].

## 2018-01-26 NOTE — PROGRESS NOTES
Hematology/Oncology  Progress Note    Name: Katharine Bynum  Date : 2018  : 1980    PCP: Elizabeth Barba DO     Ms. Nathalia Hogue is a 42year old female who was seen for management of her secondary polycythemia. Current therapy: Therapeutic phlebotomy whenever the hematocrit is in excess of 45%. 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 She reports that she has significantly decreased her tobacco consumption and she is feeling much better. She has no new complaints or concerns to report at this time. She states she did not need therapeutic phlebotomy in a while. She does not have any complaints to report at this time. Past medical history, family history, and social history: these were reviewed and remains unchanged.     Past Medical History:   Diagnosis Date    Discoid lupus     skin lupus    Hypertension     during pregnancy/6 months after    IBS (irritable bowel syndrome) 2016    Menorrhagia     hysterectomy    Polycythemia      Past Surgical History:   Procedure Laterality Date    DELIVERY       HX DILATION AND CURETTAGE      HX GYN      laproscopy    HX HYSTERECTOMY       Social History     Social History    Marital status:      Spouse name: N/A    Number of children: N/A    Years of education: N/A     Occupational History          Social History Main Topics    Smoking status: Current Every Day Smoker     Packs/day: 0.50     Years: 15.00     Types: Cigarettes    Smokeless tobacco: Never Used      Comment:     Alcohol use 0.0 oz/week     1 - 4 Glasses of wine per week      Comment: occ    Drug use: No    Sexual activity: Yes     Partners: Male     Birth control/ protection: None     Other Topics Concern    Not on file     Social History Narrative     Family History   Problem Relation Age of Onset    Alcohol abuse Mother     Drug Abuse Mother     Arthritis-rheumatoid Mother     Hypertension Father     Hypertension Sister     High Cholesterol Sister     Dementia Sister     Stroke Sister     Arthritis-rheumatoid Maternal Grandmother     Alcohol abuse Maternal Grandfather     Arthritis-rheumatoid Maternal Grandfather     Other Maternal Grandfather      ruptured bowel    Stroke Sister      Current Outpatient Prescriptions   Medication Sig Dispense Refill    ALPRAZolam (XANAX) 0.5 mg tablet Take 1/2 to 1 tab daily as needed for anxiety MDD: 1 tab 30 Tab 0    montelukast (SINGULAIR) 10 mg tablet Take 1 Tab by mouth daily. 30 Tab 1    naproxen (NAPROSYN) 500 mg tablet Take 1 Tab by mouth every twelve (12) hours as needed. 180 Tab 0    APREMILAST (OTEZLA STARTER PO) Take  by mouth.  ergocalciferol (VITAMIN D2) 50,000 unit capsule Take 1 Cap by mouth every seven (7) days. 4 Cap 2    dicyclomine (BENTYL) 20 mg tablet Take 20 mg by mouth every six (6) hours.  fexofenadine (ALLEGRA) 180 mg tablet Take 1 Tab by mouth daily. 90 Tab 3       Review of Systems  Constitutional: The patient has no acute distress or discomfort. HEENT: The patient denies recent head trauma, eye pain, blurred vision,  hearing deficit, oropharyngeal mucosal pain or lesions, and the patient denies throat pain or discomfort. Lymphatics: The patient denies palpable peripheral lymphadenopathy. Hematologic: The patient denies having bruising, bleeding, or progressive fatigue. Respiratory: Patient denies having shortness of breath, cough, sputum production, fever, or dyspnea on exertion. Cardiovascular: The patient denies having leg pain, leg swelling, heart palpitations, chest permit, chest pain, or lightheadedness. The patient denies having dyspnea on exertion. Gastrointestinal: The patient denies having nausea, emesis, or diarrhea. The patient denies having any hematemesis or blood in the stool. Genitourinary: Patient denies having urinary urgency, frequency, or dysuria.   The patient denies having blood in the urine. Psychological: The patient denies having symptoms of nervousness, anxiety, depression, or thoughts of harming himself some of this. Skin: Patient denies having skin rashes, skin, ulcerations, or unexplained itching or pruritus. Musculoskeletal: The patient denies having pain in the joints, tenderness, or bones. Objective:     Visit Vitals    /81    Pulse 81    Temp 98.5 °F (36.9 °C)    Wt 79.8 kg (176 lb)    LMP 10/29/2014    BMI 26.76 kg/m2     ECOG 0  Physical Exam:   Gen. Appearance: The patient is in no acute distress. Skin: There is no bruise or rash. HEENT: The exam is unremarkable. Neck: Supple without lymphadenopathy or thyromegaly. Lungs: Clear to auscultation and percussion; there are no wheezes or rhonchi. Heart: Regular rate and rhythm; there are no murmurs, gallops, or rubs. Abdomen: Bowel sounds are present and normal.  There is no guarding, tenderness, or hepatosplenomegaly. Extremities: There is no clubbing, cyanosis, or edema. Neurologic: There are no focal neurologic deficits. Lymphatics: There is no palpable peripheral lymphadenopathy. Musculoskeletal: The patient has full range of motion at all joints. There is no evidence of joint deformity or effusions. There is no focal joint tenderness. Psychological/psychiatric: There is no clinical evidence of anxiety, depression, or melancholy.     Lab data:      Results for orders placed or performed during the hospital encounter of 01/26/18   CBC WITH 3 PART DIFF     Status: None   Result Value Ref Range Status    WBC 5.5 4.5 - 13.0 K/uL Final    RBC 4.58 4.10 - 5.10 M/uL Final    HGB 14.8 12.0 - 16.0 g/dL Final    HCT 42.6 36 - 48 % Final    MCV 93.0 78 - 102 FL Final    MCH 32.3 25.0 - 35.0 PG Final    MCHC 34.7 31 - 37 g/dL Final    RDW 12.1 11.5 - 14.5 % Final    PLATELET 011 782 - 889 K/uL Final    NEUTROPHILS 69 40 - 70 % Final    MIXED CELLS 7 0.1 - 17 % Final    LYMPHOCYTES 24 14 - 44 % Final    ABS. NEUTROPHILS 3.8 1.8 - 9.5 K/UL Final    ABS. MIXED CELLS 0.4 0.0 - 2.3 K/uL Final    ABS. LYMPHOCYTES 1.3 1.1 - 5.9 K/UL Final     Comment: Test performed at [place] [address] Location. Results Reviewed by Medical Director. DF AUTOMATED   Final         Assessment:     1. Polycythemia    2. Anxiety    3. Vitamin D deficiency      Plan:   Polycythemia: I have explained to the patient that the CBC from today shows a WBC count of 5.5, hemoglobin is 14.8g/dL, and the hematocrit is 42.6%. She will not require therapeutic phlebolotomy at this time. She states that she is working on decreasing tobacco use. I have explained to the patient that as long as her hematocrit stays below 45%, she will not need to undergo therapeutic phlebotomy. The comprehensive metabolic panel, iron profile, ferritin level will be obtained at this time. Anxiety: The patient will continue Xanax as needed. The patient reports decreased use of this medication. Vitamin D Deficiency: the patient has a history of vitamin D deficiency. I will repeat a vitamin D level today. She will be started on oral weekly therapy if indicated. I will have her return to the clinic again in 4 months or sooner if indicated.     Orders Placed This Encounter    COMPLETE CBC & AUTO DIFF WBC    InHouse CBC ([employer])     Standing Status:   Future     Number of Occurrences:   1     Standing Expiration Date:   2/2/2018    IRON PROFILE     Standing Status:   Future     Number of Occurrences:   1     Standing Expiration Date:   1/27/2019    FERRITIN     Standing Status:   Future     Number of Occurrences:   1     Standing Expiration Date:   1/82/2284    METABOLIC PANEL, COMPREHENSIVE     Standing Status:   Future     Number of Occurrences:   1     Standing Expiration Date:   1/27/2019    VITAMIN D, 25 HYDROXY     Standing Status:   Future     Number of Occurrences:   1     Standing Expiration Date:   1/27/2019       Manolo England MD  01/26/2018

## 2018-01-26 NOTE — PATIENT INSTRUCTIONS
Learning About Vitamin D  Why is it important to get enough vitamin D? Your body needs vitamin D to absorb calcium. Calcium keeps your bones and muscles, including your heart, healthy and strong. If your muscles don't get enough calcium, they can cramp, hurt, or feel weak. You may have long-term (chronic) muscle aches and pains. If you don't get enough vitamin D throughout life, you have an increased chance of having thin and brittle bones (osteoporosis) in your later years. Children who don't get enough vitamin D may not grow as much as others their age. They also have a chance of getting a rare disease called rickets. It causes weak bones. Vitamin D and calcium are added to many foods. And your body uses sunshine to make its own vitamin D. How much vitamin D do you need? The [place] of Medicine recommends that people ages 3 through 79 get 600 IU (international units) every day. Adults 71 and older need 800 IU every day. Blood tests for vitamin D can check your vitamin D level. But there is no standard normal range used by all laboratories. The [place] of Medicine recommends a blood level of 20 ng/mL of vitamin D for healthy bones. And most people in the [place] and [facility] ([facility]) meet this goal.  How can you get more vitamin D? Foods that contain vitamin D include:  · [place], tuna, and mackerel. These are some of the best foods to eat when you need to get more vitamin D.  · Cheese, egg yolks, and beef liver. These foods have vitamin D in small amounts. · Milk, soy drinks, orange juice, yogurt, margarine, and some kinds of cereal have vitamin D added to them. Some people don't make vitamin D as well as others. They may have to take extra care in getting enough vitamin D. Things that reduce how much vitamin D your body makes include:  · Dark skin, such as many  Americans have. · Age, especially if you are older than 72. · Digestive problems, such as Crohn's or celiac disease.   · Liver and kidney disease. Some people who do not get enough vitamin D may need supplements. Are there any risks from taking vitamin D?  · Too much vitamin D:  ¨ Can damage your kidneys. ¨ Can cause nausea and vomiting, constipation, and weakness. ¨ Raises the amount of calcium in your blood. If this happens, you can get confused or have an irregular heart rhythm. · Vitamin D may interact with other medicines. Tell your doctor about all of the medicines you take, including over-the-counter drugs, herbs, and pills. Tell your doctor about all of your current medical problems. Where can you learn more? Go to http://naviSquareOne Mailzofia.info/. Enter [id number] in the search box to learn more about \"Learning About Vitamin D.\"  Current as of: May 12, 2017  Content Version: 11.4  © 5416-7074 [employer]. Care instructions adapted under license by [employer] (which disclaims liability or warranty for this information). If you have questions about a medical condition or this instruction, always ask your healthcare professional. [address] any warranty or liability for your use of this information.

## 2018-01-27 LAB — 25(OH)D3 SERPL-MCNC: 16.7 NG/ML (ref 30–100)

## 2018-01-30 ENCOUNTER — OFFICE VISIT (OUTPATIENT)
Dept: ORTHOPEDIC SURGERY | Facility: CLINIC | Age: 38
End: 2018-01-30

## 2018-01-30 VITALS
DIASTOLIC BLOOD PRESSURE: 84 MMHG | RESPIRATION RATE: 18 BRPM | BODY MASS INDEX: 26.86 KG/M2 | OXYGEN SATURATION: 100 % | HEART RATE: 79 BPM | HEIGHT: 68 IN | TEMPERATURE: 96.6 F | SYSTOLIC BLOOD PRESSURE: 137 MMHG | WEIGHT: 177.2 LBS

## 2018-01-30 DIAGNOSIS — M77.8 LEFT WRIST TENDINITIS: ICD-10-CM

## 2018-01-30 DIAGNOSIS — M77.8 RIGHT WRIST TENDINITIS: Primary | ICD-10-CM

## 2018-01-30 DIAGNOSIS — G56.22 CUBITAL TUNNEL SYNDROME, LEFT: ICD-10-CM

## 2018-01-30 NOTE — PROGRESS NOTES
Patient: Altamese Boast                MRN: [id number]       SSN: [id number]  YOB: 1980        AGE: 42 y.o. SEX: female  Body mass index is 26.94 kg/(m^2). PCP: MARKUS Almanza  01/30/18    Chief Complaint: Bilateral wrist pain, left hand numbness/tingling    HPI: Altamese Boast is a 42 y.o. female who comes to the office today with a complaint of HISTORY OF PRESENT ILLNESS: Ms. Supa Orta presents for bilateral pain in her wrists on the extensor side, as well as left numbness and tingling in the ring and small finger. The symptoms have been present for several months. She denies any trauma. She states in the left arm she has pain from the elbow all the way down to the wrist.  The right arm pain is mostly in the wrist, but the pain sometimes goes up from the wrist to the elbow on the right side. She denies any other numbness or tingling. She does have some neck pain. She states that the pain is worse at the end of the day, specifically when she tries to open jars or really use her hands for any activities where she is gripping. She does not report any night symptoms overall, specifically with the numbness and tingling. She states she has almost persistent numbness and tingling at most times of the day with a difference in the sensation in her ulnar nerve distribution on the left side. She thinks most of her symptoms relate back to her posture at work where she works at [employer]. She does notice the symptoms of the nerve pain in her left arm are worse when she is working, resting the medial side of her left elbow on the desk. She has not had any physical therapy or injections. She also reports a sensation of weakness and pain in the thenar eminence of both hands, especially at the end of the day.         Past Medical History:   Diagnosis Date    Discoid lupus     skin lupus    Hypertension     during pregnancy/6 months after    IBS (irritable bowel syndrome) 2016    Menorrhagia     hysterectomy    Polycythemia        Family History   Problem Relation Age of Onset    Alcohol abuse Mother     Drug Abuse Mother    [facility] Arthritis-rheumatoid Mother     Hypertension Father     Hypertension Sister     High Cholesterol Sister     Dementia Sister     Stroke Sister     Arthritis-rheumatoid Maternal Grandmother     Alcohol abuse Maternal Grandfather     Arthritis-rheumatoid Maternal Grandfather     Other Maternal Grandfather      ruptured bowel    Stroke Sister        Current Outpatient Prescriptions   Medication Sig Dispense Refill    ALPRAZolam (XANAX) 0.5 mg tablet Take 1/2 to 1 tab daily as needed for anxiety MDD: 1 tab 30 Tab 0    montelukast (SINGULAIR) 10 mg tablet Take 1 Tab by mouth daily. 30 Tab 1    naproxen (NAPROSYN) 500 mg tablet Take 1 Tab by mouth every twelve (12) hours as needed. 180 Tab 0    dicyclomine (BENTYL) 20 mg tablet Take 20 mg by mouth every six (6) hours.  fexofenadine (ALLEGRA) 180 mg tablet Take 1 Tab by mouth daily. 90 Tab 3    APREMILAST (OTEZLA STARTER PO) Take  by mouth.  ergocalciferol (VITAMIN D2) 50,000 unit capsule Take 1 Cap by mouth every seven (7) days.  4 Cap 2       No Known Allergies    Past Surgical History:   Procedure Laterality Date    DELIVERY       HX DILATION AND CURETTAGE      HX GYN      laproscopy    HX HYSTERECTOMY         Social History     Social History    Marital status:      Spouse name: N/A    Number of children: N/A    Years of education: N/A     Occupational History          Social History Main Topics    Smoking status: Current Every Day Smoker     Packs/day: 0.50     Years: 15.00     Types: Cigarettes    Smokeless tobacco: Never Used      Comment:     Alcohol use 0.0 oz/week     1 - 4 Glasses of wine per week      Comment: occ    Drug use: No    Sexual activity: Yes     Partners: Male     Birth control/ protection: None     Other Topics Concern    Not on file     Social History Narrative       REVIEW OF SYSTEMS:      CON: negative for recent weight loss/gain, fever, or chills  EYE: negative for double or blurry vision  ENT: negative for hoarseness  RS:   negative for cough, URI, SOB  CV:  negative for chest pain, palpitations  GI:    negative for blood in stool, nausea/vomiting  :  negative for blood in urine  MS: As per HPI  Other systems reviewed and noted below. PHYSICAL EXAMINATION:  Visit Vitals    /84    Pulse 79    Temp 96.6 °F (35.9 °C) (Oral)    Resp 18    Ht 5' 8\" (1.727 m)    Wt 177 lb 3.2 oz (80.4 kg)    LMP 10/29/2014    SpO2 100%    BMI 26.94 kg/m2     Body mass index is 26.94 kg/(m^2). GENERAL: Alert and oriented x3, in no acute distress, well-developed, well-nourished. HEENT: Normocephalic, atraumatic. RESP: Non labored breathing with equal chest rise on inspiration. CV: Well perfused extremities. No cyanosis or clubbing noted. ABDOMEN: Soft, non-tender, non-distended. SPINE: Cervical spine with full ROM. No crepitus with ROM testing. Negative spurling's bilaterally. GAIT: Normal tandem gait  MUSCULOSKELETAL: PHYSICAL EXAMINATION:  Examination of the right wrist reveals no obvious deformity or muscle atrophy. There is no tenderness to palpation. She has full wrist range of motion. She has mild pain with resisted wrist extension, but overall a normal wrist and hand examination. Negative Tinels at the cubital tunnel on the right. Examination of the left wrist reveals no obvious deformity. No signs of trauma. She has a negative Tinels at the cubital tunnel. She has a subjective complaint of decreased sensation in the ulnar nerve distribution at the ring and small fingertip. Otherwise sensation is normal.  She has mild pain over the wrist with resisted wrist extension. No weakness. She is neurovascularly intact. No focal neurological deficits.   Capillary refill is brisk in both extremities. RADIOGRAPHS:  X-rays were not obtained today, but an x-ray of the right hand was reviewed which was taken last February and does not show any acute or chronic bony abnormalities. ASSESSMENT/PLAN:  Ms. Bryon Mayer is a 77-year-old female with bilateral wrist pain, specifically on the extensor side of her wrist, as well as a complaint of cubital tunnel. Her physical examination for these complaints is relatively benign today in the office. I think most likely her complaints are related to overuse, as well as resting her elbow while typing. I have recommended a ergonomic assessment. She states that this is not likely to be done in her office, as she works for a [employer] which I understand. Therefore I have written her for occupational therapy to work on wrist extensor tendinitis as well as left cubital tunnel and postural training. I have recommended she take an anti-inflammatory. If her symptoms for cubital tunnel continue, the next step would be to get an EMG and nerve conduction test of her left upper extremity, but I will hold off on that for now as her symptoms are not reproducible in the office today. If her symptoms persist she will come back in to see me but I am hopeful that a short course of occupational therapy as well as some postural training and an elbow brace to help support and pad her ulnar nerve will resolve her symptoms. All of her questions were answered.           Electronically signed by: Marlene Elliott MD

## 2018-02-09 ENCOUNTER — HOSPITAL ENCOUNTER (OUTPATIENT)
Dept: PHYSICAL THERAPY | Age: 38
Discharge: HOME | End: 2018-02-09
Payer: COMMERCIAL

## 2018-02-09 PROCEDURE — 97110 THERAPEUTIC EXERCISES: CPT

## 2018-02-09 PROCEDURE — 97165 OT EVAL LOW COMPLEX 30 MIN: CPT

## 2018-02-09 NOTE — PROGRESS NOTES
[facility]  [address] [facility] [address]  [place], [address]  [phone] fax    Plan of Care/Statement of Necessity for Occupational Therapy Services    Patient name: Kartik Joshua Start of Care: 2018   Referral source: Scottie Hummel MD : 1980    Medical Diagnosis: Other enthesopathies, not elsewhere classified [M77.8]  Lesion of ulnar nerve, left upper limb [G56.22]   Onset Date:2 years    Treatment Diagnosis: B wrist pain   Prior Hospitalization: see medical history Provider#: [id number]   Medications: Verified on Patient summary List    Comorbidities: none reported   Prior Level of Function: Able to perform tasks without numbness and tingling in B wrists. The Plan of Care and following information is based on the information from the initial evaluation. Assessment/ key information: Patient is a 42 y.o. female with a chief complaint  of Eric wrist pain, weakness, and numbness. Pt also reports L elbow pain. 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 Instructed patient on work station modifications and appropriate sitting posture while completing desk work. Pt provided HEP consisting of nerve glides to decrease pain, numbness, and tingling reported in Eric UEs. Pt also reports significant neck pain that radiates down eric UEs, therefore, I recommended pt to follow-up with MD to seek physical therapy order for the neck and pt agreed. Skilled OT services are necessary to address L cubital tunnel syndrome and Eric wrist pain and weakness and to improve quality of life.     Evaluation Complexity: History LOW Complexity : Brief history review  Examination LOW Complexity : 1-3 performance deficits relating to physical, cognitive , or psychosocial skils that result in activity limitations and / or participation restrictions  Clinical Decision Making LOW Complexity : No comorbidities that affect functional and no verbal or physical assistance needed to complete eval tasks   Overall Complexity Rating: LOW     Problem List: Pain effecting function, Decreased range of motion, Decreased strength, Edema effecting function and Decreased flexibility/joint mobility     Treatment Plan may include any combination of the following: Therapeutic exercise, Therapeutic activities, Physical agent/modality, Manual therapy and Patient education    Patient / Family readiness to learn indicated by: interest    Persons(s) to be included in education:   patient (P)    Barriers to Learning/Limitations: None    Patient Goal (s): Stop the pain and numbness.     Patient Self Reported Health Status: good    Rehabilitation Potential: good    Short Term Goals: To be accomplished in 2  weeks:  1. Patient will be compliant with initial home exercise program to take an active role in their rehabilitation process. Status at Eval: Pt provided and instructed in HEP.     2. Patient will demonstrate a good understanding of their condition and strategies for self-management. Status at Eval: Pt educated in minimizing repetitive elbow flexion and extension of the elbow, prolonged flexion of the elbow and direct pressure to the medial-posterior aspect of the elbow. Also avoiding repetitive B wrist flex and ext.     Long Term Goals: To be accomplished in 8 weeks:                        1. Patient will have 60 degrees of left wrist extension in order to increase ease with ADL's such as bathing, eating and dressing and to eventually bear weight thru the left upper extremity as in pushing up from a chair. Status at eval: 48     2. Pt will have 65 pounds of  in the right hand to allow for functional grasp for all ADL activities including dressing, bathing and self care. At Eval: 55     3.  Patient will have improved right lateral pinch strength of  19 pounds in order to perform fine motor tasks such as turning pages, turning ignition and open containers. At Eval: 13     4. Patient will report no difficulty carrying a shopping on FOTO outcome measure in order to demonstrate increased functional performance. Status At Eval: Moderate difficulty     Frequency / Duration: Patient to be seen 1 times per week for 8 weeks:    Patient/ Caregiver education and instruction: Diagnosis, prognosis, self care and activity modification  [x]  Plan of care has been reviewed with Marcia Russo OT 2/9/2018 4:38 PM  ________________________________________________________________________    I certify that the above Therapy Services are being furnished while the patient is under my care. I agree with the treatment plan and certify that this therapy is necessary.     Physician's Signature:____________________  Date:____________Time:__________    Please sign and return to [facility] [address] [place] [address] & [facility]  [address] [facility] [address]  [place], [facility] [address]  [phone] fax

## 2018-02-09 NOTE — PROGRESS NOTES
Hand Therapy Evaluation and Daily Note    Patient Name: Chastity Edwards  [id number]  : 1980  Age: 42 y.o.y/o  [x]  Patient  Verified  Payor: Eugene Wetzel / Plan: Dari Burden / Product Type: PPO /    Referring Provider: MD Sabine Mckinney MD Visit:  None scheduled  Onset Date:  None scheduled  Surgical Date: N/A  Surgical Procedure: N/A    In time: 4:35  Out time: 5:20  Total Treatment Time (min): 45  Visit #: 1 of 8    Treatment Area: Other enthesopathies, not elsewhere classified [M77.8]  Lesion of ulnar nerve, left upper limb [G56.22]    Precautions: none    Hand Dominance: left handed   Hand Involved: left and right  Total Evaluation Time:  30    History of Present Condition:  Patient is a 42 y.o. female with a chief complaint  of Eric wrist pain, weakness, and numbness. Pt also reports L elbow pain. 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 Instructed patient on work station modifications and appropriate sitting posture while completing desk work. Pt provided HEP consisting of nerve glides to decrease pain, numbness, and tingling reported in Eric UEs. Pt also reports significant neck pain that radiates down eric UEs, therefore, I recommended pt to follow-up with MD to seek physical therapy order for the neck and pt agreed. Skilled OT services are necessary to address L cubital tunnel syndrome and Eric wrist pain and weakness and to improve quality of life. Pain Rating:   Current: (0-no pain 10-debilitating pain) severe   At best: (0-no pain 10-debilitating pain) mild  At worst: (0-no pain 10-debilitating pain) severe  Location: bilateral wrist  Type:  constant  Better with: not using my hands  Worse with: working    Medications/Allergies/Past Medical History:  See chart; reviewed with patient.  None reported    Diagnostic Tests: X-ray: MD reported no significant findings, tendintinits    Prior Level of Function: Able to perform tasks without functional limitations    Current Level of Function:  Unable to open jars,     Social History: Pt reports living in home with spouse, 2 children, and dogs    Occupation/Job Requirements: , typing    Observation: Pt presents  With skin intact, minor wrist ROM deficits. Scar/incision:   N/A  Location:  B wrists     Palpation:  No tenderness reported in Panchito hands, wrist, forearm     Range of Motion:   Elbow/Wrist   Wrist 2/9/18  L/R       Flex 0-90/0-90       Ext 0-48/ 0-60       UD 0-30       RD 0-20       FA         Pro 0-80       Sup 0-80       Elbow        Flex 0-150       Ext 0           Strength:   Measurements: Taken with Sujit Dynamometer, in Lbs   Level 2 Date  2/9/18 Date Date Date Date Date Date   Right 55         Left 70         Deficit 15         Change                Pinch Measurements: Taken with Pinch Gauge, in Lbs   (hand) Date  2/9/18 Date Date Date Date Date   Lateral          Right 13        Left  19        Deficit 6        Change         Pad         Right 14        Left 12        Deficit 2        Change         Jett         Right 14        Left 14        Deficit 0        Change           Sensation:    normal    Edema: GIRTH CHART measured in cm  Date: 2/9/18 2/9/18   Side R L   DPC circum.  19.7 19.3   Wrist Crease 15.6 15.7   FA  24 23.4   Elbow 26 26.4     ADLs  Feeding:        []MaxA   []ModA   []Basilia   [] CGA   []SBA   []Kelsie   [x]Independent  UE Dressing:       []MaxA   []ModA   []Basilia   [] CGA   []SBA   []Kelsie   [x]Independent  LE Dressing:       []MaxA   []ModA   []Basilia   [] CGA   []SBA   []Kelsie   [x]Independent  Grooming:       []MaxA   []ModA   []Basilia   [] CGA   []SBA   []Kelsie   [x]Independent  Toileting:       []MaxA   []ModA   []Basilia   [] CGA   []SBA   []Kelsie   [x]Independent  Bathing:       []MaxA   []ModA   []Basilia   [] CGA   []SBA   []Kelsie   [x]Independent  Light Meal Prep:    []MaxA   []ModA   []Basilia   [] CGA   []SBA []Kelsie   [x]Independent  Household/Other: []MaxA   []ModA   []Basilia   [] CGA   []SBA   []Kelsie   [x]Independent  Adaptive Equip:     []MaxA   []ModA   []Basilia   [] CGA   []SBA   []Kelsie   []Independent  Driving:       []MaxA   []ModA   []Basilia   [] CGA   []SBA   []Kelsie   [x]Independent      Todays Treatment:  Patient received an initial evaluation today followed by education as to diagnosis, precautions and treatment plan. Patient was provided with a basic home exercise program including radial, ulnar, and median nerve glides. OBJECTIVE       15 min Therapeutic Exercise:  [x] See flow sheet :   Rationale: decrease numbness and tingling to improve the patients ability to use Panchito UEs for functional tasks without limitations. With   [] TE   [] TA   [] neuro   [] other: Patient Education: [x] Review HEP    [] Progressed/Changed HEP based on:   [] positioning   [] body mechanics   [] transfers   [] heat/ice application   [] Splint wear/care   [] Sensory re-education   [] scar management      [] other:      Pain Level (0-10 scale) post treatment: 7/10    Patient will continue to benefit from skilled OT services to modify and progress therapeutic interventions and analyze and cue movement patterns to attain goals. Assessment: Pt referred back to MD to request physical therapy evaluation for neck due to pt c/o of pain starting at the neck and radiating down into the fingers. Pt educated on cubital tunnel syndrome and to minimize repetitive flexion/extension of elbows and direct pressure to the medial-posterior aspect of the elbow. Also avoiding repetitive B wrist flex and ext. Pt agreed to HEP and will schedule follow-up OT appts for elbow and wrists after seeing MD about neck issue next week. Short Term Goals: To be accomplished in 2  weeks:  1. Patient will be compliant with initial home exercise program to take an active role in their rehabilitation process.   Status at [facility]: Pt provided and instructed in HEP.    2. Patient will demonstrate a good understanding of their condition and strategies for self-management. Status at Eval: Pt educated in minimizing repetitive elbow flexion and extension of the elbow, prolonged flexion of the elbow and direct pressure to the medial-posterior aspect of the elbow. Also avoiding repetitive B wrist flex and ext. Long Term Goals: To be accomplished in 8 weeks:   1. Patient will have 60 degrees of left wrist extension in order to increase ease with ADL's such as bathing, eating and dressing and to eventually bear weight thru the left upper extremity as in pushing up from a chair. Status at eval: 48    2. Pt will have 65 pounds of  in the right hand to allow for functional grasp for all ADL activities including dressing, bathing and self care. At Eval: 55    3. Patient will have improved right lateral pinch strength of  19 pounds in order to perform fine motor tasks such as turning pages, turning ignition and open containers. At Eval: 13    4. Patient will report no difficulty carrying a shopping on FOTO outcome measure in order to demonstrate increased functional performance. Status At Eval: Moderate difficulty    Frequency / Duration: Patient to be seen 1 times per week for 8 weeks:    Patient/ Caregiver education and instruction: Diagnosis, prognosis, self care, activity modification and exercises    Functional Status Measure:  Patient's: 50  FOTO Benchmark: 63  Expected Change: 13  FOTO score based on 0 - 100 point scale, with 100 being no impairment. These scores are determined by patient reported functional assessments compared against national benchmarked data.     Carry   Current  CK= 40-59%    Goal  CJ= 20-39%      Summer Castro OT 2/9/2018 4:38 PM

## 2018-02-22 ENCOUNTER — HOSPITAL ENCOUNTER (OUTPATIENT)
Dept: ONCOLOGY | Age: 38
Discharge: HOME | End: 2018-02-22

## 2018-02-22 ENCOUNTER — HOSPITAL ENCOUNTER (OUTPATIENT)
Dept: INFUSION THERAPY | Age: 38
Discharge: HOME | End: 2018-02-22
Payer: COMMERCIAL

## 2018-02-22 ENCOUNTER — CLINICAL SUPPORT (OUTPATIENT)
Dept: ONCOLOGY | Age: 38
End: 2018-02-22

## 2018-02-22 VITALS
HEART RATE: 85 BPM | TEMPERATURE: 97.3 F | SYSTOLIC BLOOD PRESSURE: 120 MMHG | RESPIRATION RATE: 16 BRPM | DIASTOLIC BLOOD PRESSURE: 77 MMHG

## 2018-02-22 DIAGNOSIS — D75.1 SECONDARY POLYCYTHEMIA: ICD-10-CM

## 2018-02-22 DIAGNOSIS — D75.1 SECONDARY POLYCYTHEMIA: Primary | ICD-10-CM

## 2018-02-22 LAB
BASO+EOS+MONOS # BLD AUTO: 0.8 K/UL (ref 0–2.3)
BASO+EOS+MONOS # BLD AUTO: 14 % (ref 0.1–17)
DIFFERENTIAL METHOD BLD: NORMAL
ERYTHROCYTE [DISTWIDTH] IN BLOOD BY AUTOMATED COUNT: 11.8 % (ref 11.5–14.5)
HCT VFR BLD AUTO: 39.8 % (ref 36–48)
HGB BLD-MCNC: 13.9 G/DL (ref 12–16)
LYMPHOCYTES # BLD: 1.7 K/UL (ref 1.1–5.9)
LYMPHOCYTES NFR BLD: 31 % (ref 14–44)
MCH RBC QN AUTO: 32.7 PG (ref 25–35)
MCHC RBC AUTO-ENTMCNC: 34.9 G/DL (ref 31–37)
MCV RBC AUTO: 93.6 FL (ref 78–102)
NEUTS SEG # BLD: 3.1 K/UL (ref 1.8–9.5)
NEUTS SEG NFR BLD: 55 % (ref 40–70)
PLATELET # BLD AUTO: 173 K/UL (ref 140–440)
RBC # BLD AUTO: 4.25 M/UL (ref 4.1–5.1)
WBC # BLD AUTO: 5.6 K/UL (ref 4.5–13)

## 2018-02-22 PROCEDURE — 36415 COLL VENOUS BLD VENIPUNCTURE: CPT

## 2018-02-22 NOTE — PROGRESS NOTES
[facility] Progress Note    Date: 2018    Name: Nellie Prasad    MRN: [id number]         : 1980      Ms. Eneida Cooley was assessed and education was provided. Ms. Sreedhar Torrez vitals were reviewed and patient was observed for 5 minutes prior to treatment. Visit Vitals    /77 (BP 1 Location: Left arm, BP Patient Position: At rest;Sitting)    Pulse 85    Temp 97.3 °F (36.3 °C)    Resp 16    Breastfeeding No       Lab results were obtained and reviewed. Recent Results (from the past 12 hour(s))   CBC WITH 3 PART DIFF    Collection Time: 18  3:12 PM   Result Value Ref Range    WBC 5.6 4.5 - 13.0 K/uL    RBC 4.25 4. 10 - 5.10 M/uL    HGB 13.9 12.0 - 16.0 g/dL    HCT 39.8 36 - 48 %    MCV 93.6 78 - 102 FL    MCH 32.7 25.0 - 35.0 PG    MCHC 34.9 31 - 37 g/dL    RDW 11.8 11.5 - 14.5 %    PLATELET 671 073 - 552 K/uL    NEUTROPHILS 55 40 - 70 %    MIXED CELLS 14 0.1 - 17 %    LYMPHOCYTES 31 14 - 44 %    ABS. NEUTROPHILS 3.1 1.8 - 9.5 K/UL    ABS. MIXED CELLS 0.8 0.0 - 2.3 K/uL    ABS. LYMPHOCYTES 1.7 1.1 - 5.9 K/UL    DF AUTOMATED         HCT today = 39.8. No therapeutic phlebotomy indicated today. Patient armband removed and shredded. Ms. Eneida Cooley was discharged from [place] [id number] in stable condition at 1530. She is to return on 18 at 1500 for her next appointment for CBC/Phlebotomy.     Rachelle Cameron RN  2018  3:34 PM

## 2018-03-01 ENCOUNTER — OFFICE VISIT (OUTPATIENT)
Dept: ORTHOPEDIC SURGERY | Age: 38
End: 2018-03-01

## 2018-03-01 VITALS
OXYGEN SATURATION: 100 % | TEMPERATURE: 98.1 F | HEART RATE: 95 BPM | DIASTOLIC BLOOD PRESSURE: 85 MMHG | BODY MASS INDEX: 26.83 KG/M2 | HEIGHT: 68 IN | WEIGHT: 177 LBS | SYSTOLIC BLOOD PRESSURE: 135 MMHG | RESPIRATION RATE: 18 BRPM

## 2018-03-01 DIAGNOSIS — M79.18 MYOFASCIAL PAIN: Primary | ICD-10-CM

## 2018-03-01 RX ORDER — LIDOCAINE HYDROCHLORIDE 20 MG/ML
4 INJECTION, SOLUTION EPIDURAL; INFILTRATION; INTRACAUDAL; PERINEURAL ONCE
Qty: 4 ML | Refills: 0
Start: 2018-03-01 | End: 2018-03-01

## 2018-03-01 RX ORDER — BUPIVACAINE HYDROCHLORIDE 2.5 MG/ML
4 INJECTION, SOLUTION INFILTRATION; PERINEURAL ONCE
Qty: 4 ML | Refills: 0
Start: 2018-03-01 | End: 2018-03-01

## 2018-03-01 RX ORDER — BETAMETHASONE SODIUM PHOSPHATE AND BETAMETHASONE ACETATE 3; 3 MG/ML; MG/ML
12 INJECTION, SUSPENSION INTRA-ARTICULAR; INTRALESIONAL; INTRAMUSCULAR; SOFT TISSUE ONCE
Qty: 2 ML | Refills: 0
Start: 2018-03-01 | End: 2018-03-01

## 2018-03-01 NOTE — MR AVS SNAPSHOT
[address] [facility] [address] 
[phone] Patient: Erick Corea MRN: [id number] USH:[id number]/97/[id number] Visit Information Date & Time Provider Department Dept. Phone Encounter #  
 3/1/2018  3:15 PM Tamar Ghotra MD [facility] [place] [phone] [id number] Follow-up Instructions Return if symptoms worsen or fail to improve. Your Appointments 6/1/2018 10:15 AM  
Office Visit with MD Joanne Sanchez [address] [place] [address] [facility] App[facility] Note: 4 mo fu  
 [facility] [address] [facility] [address]  
[phone]  
  
   
 [facility] [address] Upcoming Health Maintenance Date Due Pneumococcal 19-64 Medium Risk (1 of 1 - PPSV23) 5/30/1997 PAP AKA CERVICAL CYTOLOGY 6/13/2020 DTaP/Tdap/Td series (2 - Td) 12/20/2022 Allergies as of 3/1/2018  Review Complete On: 3/1/2018 By: Lexy Aguirre LPN No Known Allergies Current Immunizations  Reviewed on 2/22/2018 Name Date Influenza Vaccine 1/18/2018, 9/12/2015 Tdap 12/20/2012 Not reviewed this visit You Were Diagnosed With   
  
 Codes Comments Myofascial pain    -  Primary ICD-10-CM: M79.1 ICD-9-CM: 729.1 Vitals BP Pulse Temp Resp Height(growth percentile) Weight(growth percentile) 135/85 95 98.1 °F (36.7 °C) (Oral) 18 5' 8\" (1.727 m) 177 lb (80.3 kg) LMP SpO2 BMI OB Status Smoking Status 10/29/2014 100% 26.91 kg/m2 Hysterectomy Current Every Day Smoker Vitals History BMI and BSA Data Body Mass Index Body Surface Area  
 26.91 kg/m 2 1.96 m 2 Preferred Pharmacy Pharmacy Name Phone [id number] [place], [address] [facility] [facility] [phone] Your Updated Medication List  
  
   
This list is accurate as of 3/1/18  5:15 PM.  Always use your most recent med list.  
  
  
 ALPRAZolam 0.5 mg tablet Commonly known as:  Va Dun Take 1/2 to 1 tab daily as needed for anxiety MDD: 1 tab  
  
 dicyclomine 20 mg tablet Commonly known as:  BENTYL Take 20 mg by mouth every six (6) hours. ergocalciferol 50,000 unit capsule Commonly known as:  VITAMIN D2 Take 1 Cap by mouth every seven (7) days. fexofenadine 180 mg tablet Commonly known as:  Veleria Call Take 1 Tab by mouth daily. montelukast 10 mg tablet Commonly known as:  SINGULAIR Take 1 Tab by mouth daily. naproxen 500 mg tablet Commonly known as:  NAPROSYN Take 1 Tab by mouth every twelve (12) hours as needed. OTEZLA STARTER PO Take  by mouth. Follow-up Instructions Return if symptoms worsen or fail to improve. To-Do List   
 04/05/2018 3:00 PM  
  Appointment with [address] [place] [address] [address] at [place] [address] ([phone]) [facility]! Dear Patty Nascimento: Thank you for requesting a [employer] account. Our records indicate that you already have an active [employer] account. You can access your account anytime at [email or web address] Did you know that you can access your hospital and ER discharge instructions at any time in [employer]? You can also review all of your test results from your hospital stay or ER visit. Additional Information If you have questions, please visit the Frequently Asked Questions section of the [employer] website at [email or web address] Remember, [employer] is NOT to be used for urgent needs. For medical emergencies, dial 911. Now available from your iPhone and Android! Please provide this summary of care documentation to your next provider. Your primary care clinician is listed as Sera Pugh. If you have any questions after today's visit, please call [phone].

## 2018-03-01 NOTE — PROGRESS NOTES
[address]  [facility], [address]  Phone: [phone]  Fax: [phone]        Jessica Bartholomew  : 1980  PCP: MARKUS Marte  3/1/2018    NEW PATIENT      ASSESSMENT AND PLAN     Reshma Agarwal comes in to the office today c/o neck pain that radiates into her LUE. On examination, she had a negative Spurling's test bilaterally. Based on her pain and tenderness to palpation of her left trapezius and rhomboids, I suspect her pain is due to a trigger point associated with myofascial pain. She is neurologically intact. I gave trigger point injections in office today that provided relief. There may also be some costotransverse joint pain. There is no detectable ulnar neuropathy. Her paresthesias may be related to conduction block. She deferred PT at this time, but will call if she changes her mind. We discussed she may have a tight cubital tunnel that is not diagnostically relevant at this time. Advised on techniques for proper posture and to maintain an HEP. Pt will f/u PRN. Diagnoses and all orders for this visit:    1. Myofascial pain    Other orders  -     bupivacaine (MARCAINE) 0.25 % (2.5 mg/mL) soln injection; 4 mL by IntraMUSCular route once for 1 dose. -     INJECTION, BUPIVICAINE HYDRO  -     LIDOCAINE INJECTION  -     lidocaine, PF, (XYLOCAINE) 20 mg/mL (2 %) injection; 4 mL by Other route once for 1 dose. -     betamethasone (CELESTONE SOLUSPAN) 6 mg/mL injection; 2 mL by IntraMUSCular route once for 1 dose.  -     BETAMETHASONE ACETATE & SODIUM PHOSPHATE INJECTION 6 MG  -     [address]32 - INJECT TRIGGER POINTS, > 3       Follow-up Disposition: Not on File    CHIEF COMPLAINT  Reshma Agarwal is seen today in consultation at the request of MARKUS Marte for complaints of neck pain.       HISTORY OF PRESENT ILLNESS  Reshma Agarwal is a 42 y.o. female c/o neck pain that radiates into her left arm, especially her elbow, and into her fingers. She says the numbness and tingling is constant. No particular movements make it worse or better. She has seen Dr. Toyin Martínez for elbow and wrist pain. She has worked at a [email or web address] for the last 20 years. She works at an [employer]. Pt denies any fevers, chills, nausea, vomiting. Pt denies any chest pain and shortness of breath. Pt denies any ear, nose, and throat problems. Pt denies any fecal or urinary incontinence. PAST MEDICAL HISTORY   Past Medical History:   Diagnosis Date    Discoid lupus     skin lupus    Hypertension     during pregnancy/6 months after    IBS (irritable bowel syndrome) 2016    Menorrhagia     hysterectomy    Polycythemia        Past Surgical History:   Procedure Laterality Date    DELIVERY       HX DILATION AND CURETTAGE      HX GYN      laproscopy    HX HYSTERECTOMY         MEDICATIONS    Current Outpatient Prescriptions   Medication Sig Dispense Refill    ALPRAZolam (XANAX) 0.5 mg tablet Take 1/2 to 1 tab daily as needed for anxiety MDD: 1 tab 30 Tab 0    montelukast (SINGULAIR) 10 mg tablet Take 1 Tab by mouth daily. 30 Tab 1    naproxen (NAPROSYN) 500 mg tablet Take 1 Tab by mouth every twelve (12) hours as needed. 180 Tab 0    ergocalciferol (VITAMIN D2) 50,000 unit capsule Take 1 Cap by mouth every seven (7) days. 4 Cap 2    dicyclomine (BENTYL) 20 mg tablet Take 20 mg by mouth every six (6) hours.  fexofenadine (ALLEGRA) 180 mg tablet Take 1 Tab by mouth daily. 90 Tab 3    APREMILAST (OTEZLA STARTER PO) Take  by mouth.          ALLERGIES  No Known Allergies       SOCIAL HISTORY    Social History     Social History    Marital status:      Spouse name: N/A    Number of children: N/A    Years of education: N/A     Occupational History          Social History Main Topics    Smoking status: Current Every Day Smoker     Packs/day: 0.50     Years: 15.00     Types: Cigarettes    Smokeless tobacco: Never Used      Comment: 1998    Alcohol use 0.0 oz/week     1 - 4 Glasses of wine per week      Comment: occ    Drug use: No    Sexual activity: Yes     Partners: Male     Birth control/ protection: None     Other Topics Concern    None     Social History Narrative       FAMILY HISTORY  Family History   Problem Relation Age of Onset    Alcohol abuse Mother     Drug Abuse Mother    [address] Arthritis-rheumatoid Mother     Hypertension Father     Hypertension Sister     High Cholesterol Sister     Dementia Sister     Stroke Sister     Arthritis-rheumatoid Maternal Grandmother     Alcohol abuse Maternal Grandfather     Arthritis-rheumatoid Maternal Grandfather     Other Maternal Grandfather      ruptured bowel    Stroke Sister          REVIEW OF SYSTEMS  Review of Systems   Musculoskeletal: Positive for neck pain. LUE paraesthesia         PHYSICAL EXAMINATION  Visit Vitals    /85    Pulse 95    Temp 98.1 °F (36.7 °C) (Oral)    Resp 18    Ht 5' 8\" (1.727 m)    Wt 177 lb (80.3 kg)    LMP 10/29/2014    SpO2 100%    BMI 26.91 kg/m2         Pain Assessment  3/1/2018   Location of Pain Shoulder;Arm;Finger   Location Modifiers Left   Severity of Pain 6   Quality of Pain Dull;Aching   Quality of Pain Comment numbness and tingling   Duration of Pain Persistent   Frequency of Pain Intermittent   Aggravating Factors Other (Comment)   Aggravating Factors Comment as the day goes on   Limiting Behavior Some   Relieving Factors Other (Comment);NSAID   Relieving Factors Comment after sleeping at night   Result of Injury No         Constitutional:  Well developed, well nourished, in no acute distress. Psychiatric: Affect and mood are appropriate. HEENT: Normocephalic, atraumatic. Extraocular movements intact. Integumentary: No rashes or abrasions noted on exposed areas. Cardiovascular: Regular rate and rhythm.    Pulmonary: Clear to auscultation bilaterally. SPINE/MUSCULOSKELETAL EXAM    Cervical spine:  Neck is midline. Normal muscle tone. No focal atrophy is noted. ROM pain free. Shoulder ROM intact. Tenderness to palpation on left. Negative Spurling's sign. Negative Tinel's sign. Negative Ley's sign. Sensation in the bilateral arms grossly intact to light touch. Lumbar spine:  No rash, ecchymosis, or gross obliquity. No fasciculations. No focal atrophy is noted. No pain with hip ROM. Full range of motion. No tenderness to palpation. No tenderness to palpation at the sciatic notch. SI joints non-tender. Trochanters non tender. Sensation in the bilateral legs grossly intact to light touch. MOTOR:      Biceps  Triceps Deltoids Wrist Ext Wrist Flex Hand Intrin   Right 5/5 5/5 5/5 5/5 5/5 5/5   Left 5/5 5/5 5/5 5/5 5/5 5/5             Hip Flex  Quads Hamstrings Ankle DF EHL Ankle PF   Right 5/5 5/5 5/5 5/5 5/5 5/5   Left 5/5 5/5 5/5 5/5 5/5 5/5     DTRs are 1+ biceps, triceps, brachioradialis, patella, and Achilles. Negative Straight Leg raise. Squat not tested. No difficulty with tandem gait. Ambulation without assistive device. FWB.  reviewed    Ms. Bart Bryant has a reminder for a \"due or due soon\" health maintenance. I have asked that she contact her primary care provider for follow-up on this health maintenance. [facility]  OFFICE PROCEDURE PROGRESS NOTE      PROCEDURE: In the office today after informed consent using aseptic technique, the patient was injected with a total of 2 cc of Celestone, 4 cc each of Lidocaine and Marcaine into her left trapezius and rhomboids.      Chart reviewed for the following:   IDr. Luis, have reviewed the History, Physical and updated the Allergic reactions for [address] performed immediately prior to start of procedure:   IDr. Luis, have performed the [facility] to the start of the procedure:            * Patient was identified by name and date of birth   * Agreement on procedure being performed was verified  * Risks and Benefits explained to the patient  * Procedure site verified and marked as necessary  * Patient was positioned for comfort  * Consent was signed and verified     Time: 5:00pm    Date of procedure: 3/1/2018    Procedure performed by:  Geoffrey Gan MD    Provider assisted by: None    Patient assisted by: Self    How tolerated by patient: Pt tolerated the procedure well with no complications. 63 minutes of face-to-face contact were spent with the patient during today's visit extensively discussing symptoms and treatment plan. All questions were answered. More than half of this visit today was spent on counseling. Written by Mirella Huitron, as dictated by Dr. Lloyd Tucker. I, Dr. Lloyd Tucker, confirm that all documentation is accurate.

## 2018-03-20 NOTE — PROGRESS NOTES
[facility]  [address] [place] [address]  [place], [address]  [phone] fax    Occupational Therapy Discharge Summary    Patient name: Israel South Start of Care: 2018   Referral source: Rohan Conde MD : 1980   Medical/Treatment Diagnosis: Other enthesopathies, not elsewhere classified [M77.8]  Lesion of ulnar nerve, left upper limb [G56.22] Onset Date:2 years     Prior Hospitalization: see medical history Provider#: [id number]   Medications: Verified on Patient Summary List    Comorbidities: none reported  Prior Level of Function:Able to perform tasks without numbness and tingling in both wrists. Visits from Start of Care: 1    Missed Visits: 0  Reporting Period : 2018 to 2018  Summary of Care: Short Term Goals: To be accomplished in 2  weeks:  1. Patient will be compliant with initial home exercise program to take an active role in their rehabilitation process. Status at Eval: Pt provided and instructed in HEP. 2. Patient will demonstrate a good understanding of their condition and strategies for self-management. Status at Eval: Pt educated in minimizing repetitive elbow flexion and extension of the elbow, prolonged flexion of the elbow and direct pressure to the medial-posterior aspect of the elbow. Also avoiding repetitive B wrist flex and ext. Long Term Goals: To be accomplished in [address] [facility]  1. Patient will have 60 degrees of left wrist extension in order to increase ease with ADL's such as bathing, eating and dressing and to eventually bear weight thru the left upper extremity as in pushing up from a chair. Status at eval: 48  2. Pt will have 65 pounds of  in the right hand to allow for functional grasp for all ADL activities including dressing, bathing and self care.   At Eval: 55  3. Patient will have improved right lateral pinch strength of  19 pounds in order to perform fine motor tasks such as turning pages, turning ignition and open containers. At Eval: 13  4. Patient will report no difficulty carrying a shopping on FOTO outcome measure in order to demonstrate increased functional performance. Status At Eval: Moderate difficulty  ASSESSMENT/RECOMMENDATIONS: Unable to assess goals due to pt did not attend follow up appts. Pt called on 3 occasions to schedule follow up appts and pt stated she would call clinic back to schedule. Pt did not call back to schedule, will d/c at this time.   [x]Discontinue therapy: []Patient has reached or is progressing toward set goals      [x]Patient is non-compliant or has abdicated      []Due to lack of appreciable progress towards set goals    Gomez Karol OT 3/20/2018 1:21 PM

## 2018-03-20 NOTE — PROGRESS NOTES
MsJoseph Sonia  was called and made aware, [facility]-18 at 1500, at the [facility], has been changed to the [place] [address] location, and she agreed to the appointment location change.

## 2018-03-30 DIAGNOSIS — J30.1 CHRONIC SEASONAL ALLERGIC RHINITIS DUE TO POLLEN: ICD-10-CM

## 2018-03-30 RX ORDER — MONTELUKAST SODIUM 10 MG/1
10 TABLET ORAL DAILY
Qty: 30 TAB | Refills: 0 | Status: SHIPPED | OUTPATIENT
Start: 2018-03-30 | End: 2018-09-13 | Stop reason: SDUPTHER

## 2018-03-30 NOTE — TELEPHONE ENCOUNTER
This pharmacy faxed over request for the following prescriptions to be filled:    Medication requested :   Requested Prescriptions     Pending Prescriptions Disp Refills    montelukast (SINGULAIR) 10 mg tablet 30 Tab 1     Sig: Take 1 Tab by mouth daily. PCP: Rama or Print:  [facility]   Mail order or Local pharmacy [facility]     Scheduled appointment if not seen by current providers in office:  LOV 12/6/2017 No f/u up Scheduled at this time.   no f/u requested at last appt

## 2018-03-30 NOTE — TELEPHONE ENCOUNTER
Refill approved for 30 days. Please contact patient to schedule a routine follow up visit for further refills thanks!

## 2018-04-03 ENCOUNTER — TELEPHONE (OUTPATIENT)
Dept: FAMILY MEDICINE CLINIC | Age: 38
End: 2018-04-03

## 2018-04-03 NOTE — TELEPHONE ENCOUNTER
Called home number. Verified name and . Spoke with patient. Notified of refill on Singulair and no further refills until appointment. Patient stated will call office back to schedule appointment. Patient understood the reason for call and had no further questions or concerns.

## 2018-04-05 ENCOUNTER — HOSPITAL ENCOUNTER (OUTPATIENT)
Dept: INFUSION THERAPY | Age: 38
Discharge: HOME | End: 2018-04-05
Payer: COMMERCIAL

## 2018-04-05 VITALS
RESPIRATION RATE: 16 BRPM | HEART RATE: 82 BPM | TEMPERATURE: 98.1 F | DIASTOLIC BLOOD PRESSURE: 88 MMHG | SYSTOLIC BLOOD PRESSURE: 143 MMHG

## 2018-04-05 LAB
BASO+EOS+MONOS # BLD AUTO: 0.6 K/UL (ref 0–2.3)
BASO+EOS+MONOS # BLD AUTO: 10 % (ref 0.1–17)
DIFFERENTIAL METHOD BLD: NORMAL
ERYTHROCYTE [DISTWIDTH] IN BLOOD BY AUTOMATED COUNT: 11.8 % (ref 11.5–14.5)
HCT VFR BLD AUTO: 40.7 % (ref 36–48)
HGB BLD-MCNC: 14.2 G/DL (ref 12–16)
LYMPHOCYTES # BLD: 2.1 K/UL (ref 1.1–5.9)
LYMPHOCYTES NFR BLD: 36 % (ref 14–44)
MCH RBC QN AUTO: 32.7 PG (ref 25–35)
MCHC RBC AUTO-ENTMCNC: 34.9 G/DL (ref 31–37)
MCV RBC AUTO: 93.8 FL (ref 78–102)
NEUTS SEG # BLD: 3.1 K/UL (ref 1.8–9.5)
NEUTS SEG NFR BLD: 55 % (ref 40–70)
PLATELET # BLD AUTO: 164 K/UL (ref 140–440)
RBC # BLD AUTO: 4.34 M/UL (ref 4.1–5.1)
WBC # BLD AUTO: 5.8 K/UL (ref 4.5–13)

## 2018-04-05 PROCEDURE — 85025 COMPLETE CBC W/AUTO DIFF WBC: CPT | Performed by: INTERNAL MEDICINE

## 2018-04-05 PROCEDURE — 36415 COLL VENOUS BLD VENIPUNCTURE: CPT

## 2018-04-05 NOTE — PROGRESS NOTES
[facility] Progress Note    Date: 2018    Name: Migue Del Castillo    MRN: [id number]         : 1980      Ms. Kostas Cagle was assessed and education was provided. Ms. Karely Cardozo vitals were reviewed and patient was observed for 5 minutes prior to treatment. Visit Vitals    /88 (BP 1 Location: Left arm, BP Patient Position: At rest;Sitting)    Pulse 82    Temp 98.1 °F (36.7 °C)    Resp 16    Breastfeeding No       Lab results were obtained and reviewed. Recent Results (from the past 12 hour(s))   CBC WITH 3 PART DIFF    Collection Time: 18  3:12 PM   Result Value Ref Range    WBC 5.8 4.5 - 13.0 K/uL    RBC 4.34 4.10 - 5.10 M/uL    HGB 14.2 12.0 - 16 g/dL    HCT 40.7 36 - 48 %    MCV 93.8 78 - 102 FL    MCH 32.7 25.0 - 35.0 PG    MCHC 34.9 31 - 37 g/dL    RDW 11.8 11.5 - 14.5 %    PLATELET 661 030 - 237 K/uL    NEUTROPHILS 55 40 - 70 %    MIXED CELLS 10 0.1 - 17 %    LYMPHOCYTES 36 14 - 44 %    ABS. NEUTROPHILS 3.1 1.8 - 9.5 K/UL    ABS. MIXED CELLS 0.6 0.0 - 2.3 K/uL    ABS. LYMPHOCYTES 2.1 1.1 - 5.9 K/UL    DF AUTOMATED         Therapeutic phlebotomy not done. HCT = 40.7. Patient armband removed and shredded. Ms. Kostas Cagle was discharged from [place] [address] in stable condition at 1530. She is to return on 18 at 1400 for her next appointment for CBC/Phlebotomy.     Florence Ruiz RN  2018  3:34 PM

## 2018-05-17 ENCOUNTER — HOSPITAL ENCOUNTER (OUTPATIENT)
Dept: ONCOLOGY | Age: 38
Discharge: HOME | End: 2018-05-17

## 2018-05-17 ENCOUNTER — HOSPITAL ENCOUNTER (OUTPATIENT)
Dept: INFUSION THERAPY | Age: 38
Discharge: HOME | End: 2018-05-17
Payer: COMMERCIAL

## 2018-05-17 ENCOUNTER — CLINICAL SUPPORT (OUTPATIENT)
Dept: ONCOLOGY | Age: 38
End: 2018-05-17

## 2018-05-17 VITALS
RESPIRATION RATE: 16 BRPM | SYSTOLIC BLOOD PRESSURE: 138 MMHG | TEMPERATURE: 97.8 F | DIASTOLIC BLOOD PRESSURE: 87 MMHG | HEART RATE: 85 BPM

## 2018-05-17 DIAGNOSIS — D75.1 SECONDARY POLYCYTHEMIA: Primary | ICD-10-CM

## 2018-05-17 DIAGNOSIS — D75.1 SECONDARY POLYCYTHEMIA: ICD-10-CM

## 2018-05-17 LAB
BASO+EOS+MONOS # BLD AUTO: 0.6 K/UL (ref 0–2.3)
BASO+EOS+MONOS # BLD AUTO: 10 % (ref 0.1–17)
DIFFERENTIAL METHOD BLD: NORMAL
ERYTHROCYTE [DISTWIDTH] IN BLOOD BY AUTOMATED COUNT: 11.7 % (ref 11.5–14.5)
HCT VFR BLD AUTO: 42.1 % (ref 36–48)
HGB BLD-MCNC: 14.6 G/DL (ref 12–16)
LYMPHOCYTES # BLD: 1.9 K/UL (ref 1.1–5.9)
LYMPHOCYTES NFR BLD: 32 % (ref 14–44)
MCH RBC QN AUTO: 32.1 PG (ref 25–35)
MCHC RBC AUTO-ENTMCNC: 34.7 G/DL (ref 31–37)
MCV RBC AUTO: 92.5 FL (ref 78–102)
NEUTS SEG # BLD: 3.5 K/UL (ref 1.8–9.5)
NEUTS SEG NFR BLD: 57 % (ref 40–70)
PLATELET # BLD AUTO: 164 K/UL (ref 140–440)
RBC # BLD AUTO: 4.55 M/UL (ref 4.1–5.1)
WBC # BLD AUTO: 6 K/UL (ref 4.5–13)

## 2018-05-17 PROCEDURE — 36415 COLL VENOUS BLD VENIPUNCTURE: CPT

## 2018-05-17 NOTE — PROGRESS NOTES
[facility] Progress Note    Date: May 17, 2018    Name: Mayte Jama    MRN: [id number]         : 1980      Ms. Fuad Porter was assessed and education was provided. Ms. Steph Ge vitals were reviewed and patient was observed for 5 minutes prior to treatment. Visit Vitals    /87 (BP 1 Location: Left arm, BP Patient Position: At rest;Sitting)    Pulse 85    Temp 97.8 °F (36.6 °C)    Resp 16    Breastfeeding No       Lab results were obtained and reviewed. Recent Results (from the past 12 hour(s))   CBC WITH 3 PART DIFF    Collection Time: 18  2:15 PM   Result Value Ref Range    WBC 6.0 4.5 - 13.0 K/uL    RBC 4.55 4.10 - 5.10 M/uL    HGB 14.6 12.0 - 16.0 g/dL    HCT 42.1 36 - 48 %    MCV 92.5 78 - 102 FL    MCH 32.1 25.0 - 35.0 PG    MCHC 34.7 31 - 37 g/dL    RDW 11.7 11.5 - 14.5 %    PLATELET 155 806 - 753 K/uL    NEUTROPHILS 57 40 - 70 %    MIXED CELLS 10 0.1 - 17 %    LYMPHOCYTES 32 14 - 44 %    ABS. NEUTROPHILS 3.5 1.8 - 9.5 K/UL    ABS. MIXED CELLS 0.6 0.0 - 2.3 K/uL    ABS. LYMPHOCYTES 1.9 1.1 - 5.9 K/UL    DF AUTOMATED         Hct today = 42.1. Phlebotomy not indicated. Patient armband removed and shredded. Ms. Fuad Porter was discharged from [place] [address] in stable condition at . She is to return on 18 at 1300 for her next appointment for CBC/Phlebotomy.     Tone Quintero RN  May 17, 2018  2:34 PM

## 2018-07-19 ENCOUNTER — HOSPITAL ENCOUNTER (OUTPATIENT)
Dept: INFUSION THERAPY | Age: 38
Discharge: HOME | End: 2018-07-19
Payer: COMMERCIAL

## 2018-07-19 ENCOUNTER — CLINICAL SUPPORT (OUTPATIENT)
Dept: ONCOLOGY | Age: 38
End: 2018-07-19

## 2018-07-19 ENCOUNTER — HOSPITAL ENCOUNTER (OUTPATIENT)
Dept: LAB | Age: 38
Discharge: HOME | End: 2018-07-19
Payer: COMMERCIAL

## 2018-07-19 ENCOUNTER — HOSPITAL ENCOUNTER (OUTPATIENT)
Dept: ONCOLOGY | Age: 38
Discharge: HOME | End: 2018-07-19

## 2018-07-19 ENCOUNTER — OFFICE VISIT (OUTPATIENT)
Dept: ONCOLOGY | Age: 38
End: 2018-07-19

## 2018-07-19 VITALS
WEIGHT: 175 LBS | TEMPERATURE: 97.4 F | HEIGHT: 68 IN | SYSTOLIC BLOOD PRESSURE: 129 MMHG | BODY MASS INDEX: 26.52 KG/M2 | HEART RATE: 93 BPM | DIASTOLIC BLOOD PRESSURE: 81 MMHG

## 2018-07-19 VITALS
DIASTOLIC BLOOD PRESSURE: 81 MMHG | HEART RATE: 88 BPM | SYSTOLIC BLOOD PRESSURE: 125 MMHG | RESPIRATION RATE: 16 BRPM | TEMPERATURE: 97.7 F

## 2018-07-19 DIAGNOSIS — D75.1 SECONDARY POLYCYTHEMIA: ICD-10-CM

## 2018-07-19 DIAGNOSIS — E55.9 VITAMIN D DEFICIENCY: ICD-10-CM

## 2018-07-19 DIAGNOSIS — D75.1 POLYCYTHEMIA: ICD-10-CM

## 2018-07-19 DIAGNOSIS — D75.1 SECONDARY POLYCYTHEMIA: Primary | ICD-10-CM

## 2018-07-19 DIAGNOSIS — F41.9 ANXIETY: ICD-10-CM

## 2018-07-19 DIAGNOSIS — D75.1 POLYCYTHEMIA: Primary | ICD-10-CM

## 2018-07-19 LAB
ALBUMIN SERPL-MCNC: 3.9 G/DL (ref 3.4–5)
ALBUMIN/GLOB SERPL: 1.3 {RATIO} (ref 0.8–1.7)
ALP SERPL-CCNC: 96 U/L (ref 45–117)
ALT SERPL-CCNC: 20 U/L (ref 13–56)
ANION GAP SERPL CALC-SCNC: 7 MMOL/L (ref 3–18)
AST SERPL-CCNC: 18 U/L (ref 15–37)
BASO+EOS+MONOS # BLD AUTO: 0.8 K/UL (ref 0–2.3)
BASO+EOS+MONOS # BLD AUTO: 11 % (ref 0.1–17)
BILIRUB SERPL-MCNC: 0.5 MG/DL (ref 0.2–1)
BUN SERPL-MCNC: 9 MG/DL (ref 7–18)
BUN/CREAT SERPL: 10 (ref 12–20)
CALCIUM SERPL-MCNC: 9.4 MG/DL (ref 8.5–10.1)
CHLORIDE SERPL-SCNC: 105 MMOL/L (ref 100–108)
CO2 SERPL-SCNC: 28 MMOL/L (ref 21–32)
CREAT SERPL-MCNC: 0.87 MG/DL (ref 0.6–1.3)
DIFFERENTIAL METHOD BLD: ABNORMAL
ERYTHROCYTE [DISTWIDTH] IN BLOOD BY AUTOMATED COUNT: 12.5 % (ref 11.5–14.5)
FERRITIN SERPL-MCNC: 25 NG/ML (ref 8–388)
GLOBULIN SER CALC-MCNC: 3 G/DL (ref 2–4)
GLUCOSE SERPL-MCNC: 125 MG/DL (ref 74–99)
HCT VFR BLD AUTO: 46.9 % (ref 36–48)
HGB BLD-MCNC: 16.4 G/DL (ref 12–16)
IRON SATN MFR SERPL: 34 %
IRON SERPL-MCNC: 128 UG/DL (ref 50–175)
LYMPHOCYTES # BLD: 2.3 K/UL (ref 1.1–5.9)
LYMPHOCYTES NFR BLD: 32 % (ref 14–44)
MCH RBC QN AUTO: 32.6 PG (ref 25–35)
MCHC RBC AUTO-ENTMCNC: 35 G/DL (ref 31–37)
MCV RBC AUTO: 93.2 FL (ref 78–102)
NEUTS SEG # BLD: 3.9 K/UL (ref 1.8–9.5)
NEUTS SEG NFR BLD: 57 % (ref 40–70)
PLATELET # BLD AUTO: 207 K/UL (ref 140–440)
POTASSIUM SERPL-SCNC: 3.9 MMOL/L (ref 3.5–5.5)
PROT SERPL-MCNC: 6.9 G/DL (ref 6.4–8.2)
RBC # BLD AUTO: 5.03 M/UL (ref 4.1–5.1)
SODIUM SERPL-SCNC: 140 MMOL/L (ref 136–145)
TIBC SERPL-MCNC: 379 UG/DL (ref 250–450)
WBC # BLD AUTO: 7 K/UL (ref 4.5–13)

## 2018-07-19 PROCEDURE — 80053 COMPREHEN METABOLIC PANEL: CPT | Performed by: NURSE PRACTITIONER

## 2018-07-19 PROCEDURE — 82306 VITAMIN D 25 HYDROXY: CPT | Performed by: NURSE PRACTITIONER

## 2018-07-19 PROCEDURE — 36415 COLL VENOUS BLD VENIPUNCTURE: CPT

## 2018-07-19 PROCEDURE — 99195 PHLEBOTOMY: CPT

## 2018-07-19 PROCEDURE — 82728 ASSAY OF FERRITIN: CPT | Performed by: NURSE PRACTITIONER

## 2018-07-19 PROCEDURE — 74011250636 HC RX REV CODE- 250/636: Performed by: INTERNAL MEDICINE

## 2018-07-19 PROCEDURE — 83540 ASSAY OF IRON: CPT | Performed by: NURSE PRACTITIONER

## 2018-07-19 RX ORDER — SODIUM CHLORIDE 9 MG/ML
500 INJECTION, SOLUTION INTRAVENOUS CONTINUOUS
Status: DISPENSED | OUTPATIENT
Start: 2018-07-19 | End: 2018-07-20

## 2018-07-19 RX ADMIN — SODIUM CHLORIDE 500 ML: 9 INJECTION, SOLUTION INTRAVENOUS at 15:04

## 2018-07-19 NOTE — PROGRESS NOTES
Hematology/Oncology  Progress Note    Name: Dante Lopez  Date : 2018  : 1980    PCP: Richard Brownlee DO     Ms. Raheem Downing is a 34year old female who was seen for management of her secondary polycythemia. Current therapy: Therapeutic phlebotomy whenever the hematocrit is in excess of 45%. 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 She reports that she has recently increased cigarette smoking due to personal stressors, however, she plans to decrease consumption. She declines the aid of smoking cessation agents. r.  She has no new complaints or concerns to report at this time. She did received therapeutic phlebotomy today in the [facility]. She denies any anxiety symptoms and reports a decreased use of Xanax. Past medical history, family history, and social history: these were reviewed and remains unchanged.     Past Medical History:   Diagnosis Date    Discoid lupus     skin lupus    Hypertension     during pregnancy/6 months after    IBS (irritable bowel syndrome) 2016    Menorrhagia     hysterectomy    Polycythemia      Past Surgical History:   Procedure Laterality Date    DELIVERY       HX DILATION AND CURETTAGE      HX GYN      laproscopy    HX HYSTERECTOMY       Social History     Social History    Marital status:      Spouse name: N/A    Number of children: N/A    Years of education: N/A     Occupational History          Social History Main Topics    Smoking status: Current Every Day Smoker     Packs/day: 0.50     Years: 15.00     Types: Cigarettes    Smokeless tobacco: Never Used      Comment:     Alcohol use 0.0 oz/week     1 - 4 Glasses of wine per week      Comment: occ    Drug use: No    Sexual activity: Yes     Partners: Male     Birth control/ protection: None     Other Topics Concern    Not on file     Social History Narrative     Family History   Problem Relation Age of Onset    Alcohol abuse Mother     Drug Abuse Mother    [facility] Arthritis-rheumatoid Mother     Hypertension Father     Hypertension Sister     High Cholesterol Sister     Dementia Sister     Stroke Sister     Arthritis-rheumatoid Maternal Grandmother     Alcohol abuse Maternal Grandfather     Arthritis-rheumatoid Maternal Grandfather     Other Maternal Grandfather      ruptured bowel    Stroke Sister      Current Outpatient Prescriptions   Medication Sig Dispense Refill    montelukast (SINGULAIR) 10 mg tablet Take 1 Tab by mouth daily. 30 Tab 0    ALPRAZolam (XANAX) 0.5 mg tablet Take 1/2 to 1 tab daily as needed for anxiety MDD: 1 tab 30 Tab 0    naproxen (NAPROSYN) 500 mg tablet Take 1 Tab by mouth every twelve (12) hours as needed. 180 Tab 0    APREMILAST (OTEZLA STARTER PO) Take  by mouth.  ergocalciferol (VITAMIN D2) 50,000 unit capsule Take 1 Cap by mouth every seven (7) days. 4 Cap 2    dicyclomine (BENTYL) 20 mg tablet Take 20 mg by mouth every six (6) hours.  fexofenadine (ALLEGRA) 180 mg tablet Take 1 Tab by mouth daily. 90 Tab 3     Facility-Administered Medications Ordered in Other Visits   Medication Dose Route Frequency Provider Last Rate Last Dose    0.9% sodium chloride infusion 500 mL  500 mL IntraVENous CONTINUOUS Barbara Maldonado  mL/hr at 07/19/18 1504 500 mL at 07/19/18 1504       Review of Systems  Constitutional: The patient has no acute distress or discomfort. HEENT: The patient denies recent head trauma, eye pain, blurred vision,  hearing deficit, oropharyngeal mucosal pain or lesions, and the patient denies throat pain or discomfort. Lymphatics: The patient denies palpable peripheral lymphadenopathy. Hematologic: The patient denies having bruising, bleeding, or progressive fatigue. Respiratory: Patient denies having shortness of breath, cough, sputum production, fever, or dyspnea on exertion. Cardiovascular:  The patient denies having leg pain, leg swelling, heart palpitations, chest permit, chest pain, or lightheadedness. The patient denies having dyspnea on exertion. Gastrointestinal: The patient denies having nausea, emesis, or diarrhea. The patient denies having any hematemesis or blood in the stool. Genitourinary: Patient denies having urinary urgency, frequency, or dysuria. The patient denies having blood in the urine. Psychological: The patient denies having symptoms of nervousness, anxiety, depression, or thoughts of harming himself some of this. Skin: Patient denies having skin rashes, skin, ulcerations, or unexplained itching or pruritus. Musculoskeletal: The patient denies having pain in the joints, tenderness, or bones. Objective:     Visit Vitals    /81    Pulse 93    Temp 97.4 °F (36.3 °C) (Oral)    Ht 5' 8\" (1.727 m)    Wt 79.4 kg (175 lb)    LMP 10/29/2014    BMI 26.61 kg/m2     ECOG 0  Physical Exam:   Gen. Appearance: The patient is in no acute distress. Skin: There is no bruise or rash. HEENT: The exam is unremarkable. Neck: Supple without lymphadenopathy or thyromegaly. Lungs: Clear to auscultation and percussion; there are no wheezes or rhonchi. Heart: Regular rate and rhythm; there are no murmurs, gallops, or rubs. Abdomen: Bowel sounds are present and normal.  There is no guarding, tenderness, or hepatosplenomegaly. Extremities: There is no clubbing, cyanosis, or edema. Neurologic: There are no focal neurologic deficits. Lymphatics: There is no palpable peripheral lymphadenopathy. Musculoskeletal: The patient has full range of motion at all joints. There is no evidence of joint deformity or effusions. There is no focal joint tenderness. Psychological/psychiatric: There is no clinical evidence of anxiety, depression, or melancholy.     Lab data:      Results for orders placed or performed during the hospital encounter of 07/19/18   CBC WITH 3 PART DIFF     Status: Abnormal   Result Value Ref Range Status    WBC 7.0 4.5 - 13.0 K/uL Final    RBC 5.03 4. 10 - 5.10 M/uL Final    HGB 16.4 (HH) 12.0 - 16.0 g/dL Final    HCT 46.9 36 - 48 % Final    MCV 93.2 78 - 102 FL Final    MCH 32.6 25.0 - 35.0 PG Final    MCHC 35.0 31 - 37 g/dL Final    RDW 12.5 11.5 - 14.5 % Final    PLATELET 420 181 - 499 K/uL Final    NEUTROPHILS 57 40 - 70 % Final    MIXED CELLS 11 0.1 - 17 % Final    LYMPHOCYTES 32 14 - 44 % Final    ABS. NEUTROPHILS 3.9 1.8 - 9.5 K/UL Final    ABS. MIXED CELLS 0.8 0.0 - 2.3 K/uL Final    ABS. LYMPHOCYTES 2.3 1.1 - 5.9 K/UL Final     Comment: Test performed at [place] [address] Location. Results Reviewed by Medical Director. DF AUTOMATED   Final         Assessment:     1. Polycythemia    2. Vitamin D deficiency    3. Anxiety      Plan:   Polycythemia: I have explained to the patient that the CBC from today shows a WBC count of 7.0, hemoglobin is 16.4g/dL, and the hematocrit is 46.9%. She did receive therapeutic phlebotomy today in the [facility]. She states that she continues to decrease her tobacco use. I have explained to the patient that as long as her hematocrit stays below 45%, she will not need to undergo therapeutic phlebotomy. The comprehensive metabolic panel, iron profile, ferritin level will be obtained at this time. Anxiety: The patient will continue Xanax as needed. The patient reports decreased use of this medication. Vitamin D Deficiency: the patient has a history of vitamin D deficiency. I will repeat a vitamin D level today. She will be started on oral weekly therapy if indicated. I will have her return to the clinic again in 4 months or sooner if indicated.     Orders Placed This Encounter    METABOLIC PANEL, COMPREHENSIVE     Standing Status:   Future     Standing Expiration Date:   7/20/2019    IRON PROFILE     Standing Status:   Future     Standing Expiration Date:   7/20/2019    FERRITIN     Standing Status:   Future     Standing Expiration Date:   7/20/2019   [facility] VITAMIN D, 25 HYDROXY     Standing Status:   Future     Standing Expiration Date:   7/20/2018       Nereida Yarbrough NP  7/19/2018

## 2018-07-19 NOTE — PROGRESS NOTES
[facility] Progress Note    Date: 2018    Name: Baltazar Siegel    MRN: [id number]         : 1980      Ms. Navarro Leone was assessed and education was provided. Ms. Jeanne Centeno vitals were reviewed and patient was observed for 5 minutes prior to treatment. Visit Vitals    /81 (BP 1 Location: Left arm, BP Patient Position: At rest;Sitting)    Pulse 93    Temp 97.4 °F (36.3 °C)    Resp 16    Breastfeeding No       Lab results were obtained and reviewed. Recent Results (from the past 12 hour(s))   CBC WITH 3 PART DIFF    Collection Time: 18  2:19 PM   Result Value Ref Range    WBC 7.0 4.5 - 13.0 K/uL    RBC 5.03 4. 10 - 5.10 M/uL    HGB 16.4 (HH) 12.0 - 16.0 g/dL    HCT 46.9 36 - 48 %    MCV 93.2 78 - 102 FL    MCH 32.6 25.0 - 35.0 PG    MCHC 35.0 31 - 37 g/dL    RDW 12.5 11.5 - 14.5 %    PLATELET 592 452 - 953 K/uL    NEUTROPHILS 57 40 - 70 %    MIXED CELLS 11 0.1 - 17 %    LYMPHOCYTES 32 14 - 44 %    ABS. NEUTROPHILS 3.9 1.8 - 9.5 K/UL    ABS. MIXED CELLS 0.8 0.0 - 2.3 K/uL    ABS. LYMPHOCYTES 2.3 1.1 - 5.9 K/UL    DF AUTOMATED         Today's HCT = 46.9. Therapeutic phlebotomy started at 1438 and ended at 1504 removing approx 500 ml blood from patient. 500 ml NS IV given to patient. Patient armband removed and shredded. Ms. Navarro Leone was discharged from [place] [address] in stable condition at [phone]. She is to return on 18 at 1300 for her next appointment for phlebtomy. Ms Morenita Lawson giles be out of town next week and so her next appt is 2 weeks away.     Walker Loza RN  2018

## 2018-07-19 NOTE — MR AVS SNAPSHOT
[address] [facility] [address] [facility] [address] 
[phone] Patient: Jose Platt MRN: [id number] SNO:[id number] Visit Information Date & Time Provider Department Dept. Phone Encounter #  
 7/19/2018  3:00 PM Nadeem Rose NP [address]  [phone] [id number] Follow-up Instructions Return in about 4 months (around 11/19/2018). Your Appointments 11/15/2018  2:00 PM  
Nurse Visit with BHAVANA Castañeda [address] [facility]) Appt Note: [address] [facility] [address] [facility] [address]  
[phone]  
  
   
 [address] Upcoming Health Maintenance Date Due Pneumococcal 19-64 Medium Risk (1 of 1 - PPSV23) 5/30/1997 Influenza Age 5 to Adult 8/1/2018 PAP AKA CERVICAL CYTOLOGY 6/13/2020 DTaP/Tdap/Td series (2 - Td) 12/20/2022 Allergies as of 7/19/2018  Review Complete On: 7/19/2018 By: Nadeem Rose NP No Known Allergies Current Immunizations  Reviewed on 7/19/2018 Name Date Influenza Vaccine 1/18/2018, 9/12/2015 Tdap 12/20/2012 Reviewed by Lisa Estevez RN on 7/19/2018 at  2:28 PM  
You Were Diagnosed With   
  
 Codes Comments Polycythemia    -  Primary ICD-10-CM: D75.1 ICD-9-CM: 238.4 Vitamin D deficiency     ICD-10-CM: E55.9 ICD-9-CM: 268.9 Anxiety     ICD-10-CM: F41.9 ICD-9-CM: 300.00 Vitals BP Pulse Temp Height(growth percentile) Weight(growth percentile) LMP  
 129/81 93 97.4 °F (36.3 °C) (Oral) 5' 8\" (1.727 m) 175 lb (79.4 kg) 10/29/2014 BMI OB Status Smoking Status 26.61 kg/m2 Hysterectomy Current Every Day Smoker Vitals History BMI and BSA Data Body Mass Index Body Surface Area  
 26.61 kg/m 2 1.95 m 2 Preferred Pharmacy Pharmacy Name Phone [id number] [place], [address] [facility] [facility] [phone] Your Updated Medication List  
  
   
This list is accurate as of 7/19/18  3:50 PM.  Always use your most recent med list.  
  
  
  
  
 ALPRAZolam 0.5 mg tablet Commonly known as:  Lemuel Beecham Take 1/2 to 1 tab daily as needed for anxiety MDD: 1 tab  
  
 dicyclomine 20 mg tablet Commonly known as:  BENTYL Take 20 mg by mouth every six (6) hours. ergocalciferol 50,000 unit capsule Commonly known as:  VITAMIN D2 Take 1 Cap by mouth every seven (7) days. fexofenadine 180 mg tablet Commonly known as:  Rella Soulier Take 1 Tab by mouth daily. montelukast 10 mg tablet Commonly known as:  SINGULAIR Take 1 Tab by mouth daily. naproxen 500 mg tablet Commonly known as:  NAPROSYN Take 1 Tab by mouth every twelve (12) hours as needed. OTEZLA STARTER PO Take  by mouth. Follow-up Instructions Return in about 4 months (around 11/19/2018). To-Do List   
 07/19/2018 Lab:  VITAMIN D, 25 HYDROXY   
  
 07/20/2018 Lab:  FERRITIN   
  
 07/20/2018 Lab:  IRON PROFILE   
  
 07/20/2018 Lab:  METABOLIC PANEL, COMPREHENSIVE   
  
 08/02/2018 1:00 PM  
  Appointment with [address] [place] [address] [address] at [place] [address] ([phone]) Patient Instructions Learning About Vitamin D Why is it important to get enough vitamin D? Your body needs vitamin D to absorb calcium. Calcium keeps your bones and muscles, including your heart, healthy and strong. If your muscles don't get enough calcium, they can cramp, hurt, or feel weak. You may have long-term (chronic) muscle aches and pains. If you don't get enough vitamin D throughout life, you have an increased chance of having thin and brittle bones (osteoporosis) in your later years.  Children who don't get enough vitamin D may not grow as much as others their age. They also have a chance of getting a rare disease called rickets. It causes weak bones. Vitamin D and calcium are added to many foods. And your body uses sunshine to make its own vitamin D. How much vitamin D do you need? The [place] [employer] recommends that people ages 3 through 79 get 600 IU (international units) every day. Adults 71 and older need 800 IU every day. Blood tests for vitamin D can check your vitamin D level. But there is no standard normal range used by all laboratories. The [place] [employer] recommends a blood level of 20 ng/mL of vitamin D for healthy bones. And most people in the [place] and [facility]) meet this goal. 
How can you get more vitamin D? Foods that contain vitamin D include: 
· Wheatley, tuna, and mackerel. These are some of the best foods to eat when you need to get more vitamin D. 
· Cheese, egg yolks, and beef liver. These foods have vitamin D in small amounts. · Milk, soy drinks, orange juice, yogurt, margarine, and some kinds of cereal have vitamin D added to them. Some people don't make vitamin D as well as others. They may have to take extra care in getting enough vitamin D. Things that reduce how much vitamin D your body makes include: · Dark skin, such as many  Americans have. · Age, especially if you are older than 72. · Digestive problems, such as Crohn's or celiac disease. · Liver and kidney disease. Some people who do not get enough vitamin D may need supplements. Are there any risks from taking vitamin D? 
· Too much vitamin D: 
¨ Can damage your kidneys. ¨ Can cause nausea and vomiting, constipation, and weakness. ¨ Raises the amount of calcium in your blood. If this happens, you can get confused or have an irregular heart rhythm. · Vitamin D may interact with other medicines.  Tell your doctor about all of the medicines you take, including over-the-counter drugs, herbs, and pills. Tell your doctor about all of your current medical problems. Where can you learn more? Go to http://navi-zofia.info/. Enter [id number] in the search box to learn more about \"Learning About Vitamin D.\" 
Current as of: May 12, 2017 Content Version: 11.7 © 4242-9753 [employer]. Care instructions adapted under license by [employer] (which disclaims liability or warranty for this information). If you have questions about a medical condition or this instruction, always ask your healthcare professional. [address] any warranty or liability for your use of this information. Polycythemia: Care Instructions Your Care Instructions Polycythemia (say \"paw-serge-sy-THEE-idalia-uh) is an abnormal increase in red blood cells. It happens when the tissue inside your bones (bone marrow) makes too much blood. It also can occur if your blood does not have enough liquid, or plasma. This can make the number of red blood cells seem higher than normal. The extra red blood cells make your blood thicker than normal. This may raise your risk for blood clots that can cause heart attacks or strokes. Clots can form in the deep veins of the body, a condition called deep vein thrombosis. Or, a clot can travel through the blood to a lung (a pulmonary embolism). Your doctor may treat you by taking out some of your blood (phlebotomy). The process is like donating blood. Your doctor may even recommend that you donate blood. You may take pills to stop your body from making red blood cells. You also will get treatment for any other conditions that may cause your body to make too many red blood cells. Follow-up care is a key part of your treatment and safety. Be sure to make and go to all appointments, and call your doctor if you are having problems. It's also a good idea to know your test results and keep a list of the medicines you take. How can you care for yourself at home? · Be safe with medicines. Take your medicines exactly as prescribed. Call your doctor if you think you are having a problem with your medicine. · Drink plenty of fluids, enough so that your urine is light yellow or clear like water, before and after you have blood removed. If you have kidney, heart, or liver disease and have to limit fluids, talk with your doctor before you increase the amount of fluids you drink. · Take it easy after you have had blood removed. Do not do vigorous exercise. · If your doctor recommends aspirin, take it exactly as prescribed. Call your doctor if you think you are having a problem with your medicine. · Do not smoke. Smoking increases the risk of blood clots and may reduce the amount of oxygen in your blood. If you need help quitting, talk to your doctor about stop-smoking programs and medicines. These can increase your chances of quitting for good. · Take an antihistamine, such as a nondrowsy one like loratadine (Claritin) or one that might make you sleepy like diphenhydramine (Benadryl), if your skin is itchy. Some people who have this condition have itching. · Wear medical alert jewelry that lists your clotting problem. You can buy this at most drugstores. When should you call for help? Call 911 anytime you think you may need emergency care. For example, call if: 
  · You have sudden chest pain and shortness of breath, or you cough up blood.  
  · You have symptoms of a stroke. These may include: 
¨ Sudden numbness, tingling, weakness, or loss of movement in your face, arm, or leg, especially on only one side of your body. ¨ Sudden vision changes. ¨ Sudden trouble speaking. ¨ Sudden confusion or trouble understanding simple statements. ¨ Sudden problems with walking or balance. ¨ A sudden, severe headache that is different from past headaches.  
  · You have symptoms of a heart attack. These may include: ¨ Chest pain or pressure, or a strange feeling in the chest. 
¨ Sweating. ¨ Shortness of breath. ¨ Nausea or vomiting. ¨ Pain, pressure, or a strange feeling in the back, neck, jaw, or upper belly or in one or both shoulders or arms. ¨ Lightheadedness or sudden weakness. ¨ A fast or irregular heartbeat. After you call 911, the  may tell you to chew 1 adult-strength or 2 to 4 low-dose aspirin. Wait for an ambulance. Do not try to drive yourself.  
 Call your doctor now or seek immediate medical care if: 
  · You have signs of a blood clot, such as: 
¨ Pain in your calf, back of knee, thigh, or groin. ¨ Redness and swelling in your leg or groin.  
 Watch closely for changes in your health, and be sure to contact your doctor if you have any problems. Where can you learn more? Go to http://navi-zofia.info/. Enter N055 in the search box to learn more about \"Polycythemia: Care Instructions. \" Current as of: October 9, 2017 Content Version: 11.7 © 2688-8658 [employer]. Care instructions adapted under license by [employer] (which disclaims liability or warranty for this information). If you have questions about a medical condition or this instruction, always ask your healthcare professional. [address] any warranty or liability for your use of this information. Introducing [facility]! Dear [facility] [place]: Thank you for requesting a [employer] account. Our records indicate that you already have an active [employer] account. You can access your account anytime at [email or web address]://[email or web address] Did you know that you can access your hospital and ER discharge instructions at any time in [employer]? You can also review all of your test results from your hospital stay or ER visit. Additional Information If you have questions, please visit the Frequently Asked Questions section of the [employer] website at [email or web address] / [email or web address] Remember, [employer] is NOT to be used for urgent needs. For medical emergencies, dial 911. Now available from your iPhone and Android! Please provide this summary of care documentation to your next provider. Your primary care clinician is listed as Mike Salas. If you have any questions after today's visit, please call [phone].

## 2018-07-19 NOTE — PATIENT INSTRUCTIONS
Learning About Vitamin D  Why is it important to get enough vitamin D? Your body needs vitamin D to absorb calcium. Calcium keeps your bones and muscles, including your heart, healthy and strong. If your muscles don't get enough calcium, they can cramp, hurt, or feel weak. You may have long-term (chronic) muscle aches and pains. If you don't get enough vitamin D throughout life, you have an increased chance of having thin and brittle bones (osteoporosis) in your later years. Children who don't get enough vitamin D may not grow as much as others their age. They also have a chance of getting a rare disease called rickets. It causes weak bones. Vitamin D and calcium are added to many foods. And your body uses sunshine to make its own vitamin D. How much vitamin D do you need? The [place] [employer] recommends that people ages 3 through 79 get 600 IU (international units) every day. Adults 71 and older need 800 IU every day. Blood tests for vitamin D can check your vitamin D level. But there is no standard normal range used by all laboratories. The [place] [employer] recommends a blood level of 20 ng/mL of vitamin D for healthy bones. And most people in the [place] and [facility] ([facility]) meet this goal.  How can you get more vitamin D? Foods that contain vitamin D include:  · [place], tuna, and mackerel. These are some of the best foods to eat when you need to get more vitamin D.  · Cheese, egg yolks, and beef liver. These foods have vitamin D in small amounts. · Milk, soy drinks, orange juice, yogurt, margarine, and some kinds of cereal have vitamin D added to them. Some people don't make vitamin D as well as others. They may have to take extra care in getting enough vitamin D. Things that reduce how much vitamin D your body makes include:  · Dark skin, such as many  Americans have. · Age, especially if you are older than 72. · Digestive problems, such as Crohn's or celiac disease.   · Liver and kidney disease. Some people who do not get enough vitamin D may need supplements. Are there any risks from taking vitamin D?  · Too much vitamin D:  ¨ Can damage your kidneys. ¨ Can cause nausea and vomiting, constipation, and weakness. ¨ Raises the amount of calcium in your blood. If this happens, you can get confused or have an irregular heart rhythm. · Vitamin D may interact with other medicines. Tell your doctor about all of the medicines you take, including over-the-counter drugs, herbs, and pills. Tell your doctor about all of your current medical problems. Where can you learn more? Go to http://naviBrekford Corpzofia.info/. Enter [id number] in the search box to learn more about \"Learning About Vitamin D.\"  Current as of: May 12, 2017  Content Version: 11.7  © 0431-6303 [employer]. Care instructions adapted under license by [employer] (which disclaims liability or warranty for this information). If you have questions about a medical condition or this instruction, always ask your healthcare professional. [place] [address] any warranty or liability for your use of this information. Polycythemia: Care Instructions  Your Care Instructions    Polycythemia (say \"paw-serge-sy-THEE-idalia-uh) is an abnormal increase in red blood cells. It happens when the tissue inside your bones (bone marrow) makes too much blood. It also can occur if your blood does not have enough liquid, or plasma. This can make the number of red blood cells seem higher than normal. The extra red blood cells make your blood thicker than normal. This may raise your risk for blood clots that can cause heart attacks or strokes. Clots can form in the deep veins of the body, a condition called deep vein thrombosis. Or, a clot can travel through the blood to a lung (a pulmonary embolism). Your doctor may treat you by taking out some of your blood (phlebotomy). The process is like donating blood. Your doctor may even recommend that you donate blood. You may take pills to stop your body from making red blood cells. You also will get treatment for any other conditions that may cause your body to make too many red blood cells. Follow-up care is a key part of your treatment and safety. Be sure to make and go to all appointments, and call your doctor if you are having problems. It's also a good idea to know your test results and keep a list of the medicines you take. How can you care for yourself at home? · Be safe with medicines. Take your medicines exactly as prescribed. Call your doctor if you think you are having a problem with your medicine. · Drink plenty of fluids, enough so that your urine is light yellow or clear like water, before and after you have blood removed. If you have kidney, heart, or liver disease and have to limit fluids, talk with your doctor before you increase the amount of fluids you drink. · Take it easy after you have had blood removed. Do not do vigorous exercise. · If your doctor recommends aspirin, take it exactly as prescribed. Call your doctor if you think you are having a problem with your medicine. · Do not smoke. Smoking increases the risk of blood clots and may reduce the amount of oxygen in your blood. If you need help quitting, talk to your doctor about stop-smoking programs and medicines. These can increase your chances of quitting for good. · Take an antihistamine, such as a nondrowsy one like loratadine (Claritin) or one that might make you sleepy like diphenhydramine (Benadryl), if your skin is itchy. Some people who have this condition have itching. · Wear medical alert jewelry that lists your clotting problem. You can buy this at most drugstores. When should you call for help? Call 911 anytime you think you may need emergency care.  For example, call if:    · You have sudden chest pain and shortness of breath, or you cough up blood.     · You have symptoms of a stroke. These may include:  ¨ Sudden numbness, tingling, weakness, or loss of movement in your face, arm, or leg, especially on only one side of your body. ¨ Sudden vision changes. ¨ Sudden trouble speaking. ¨ Sudden confusion or trouble understanding simple statements. ¨ Sudden problems with walking or balance. ¨ A sudden, severe headache that is different from past headaches.     · You have symptoms of a heart attack. These may include:  ¨ Chest pain or pressure, or a strange feeling in the chest.  ¨ Sweating. ¨ Shortness of breath. ¨ Nausea or vomiting. ¨ Pain, pressure, or a strange feeling in the back, neck, jaw, or upper belly or in one or both shoulders or arms. ¨ Lightheadedness or sudden weakness. ¨ A fast or irregular heartbeat. After you call 911, the  may tell you to chew 1 adult-strength or 2 to 4 low-dose aspirin. Wait for an ambulance. Do not try to drive yourself.    Call your doctor now or seek immediate medical care if:    · You have signs of a blood clot, such as:  ¨ Pain in your calf, back of knee, thigh, or groin. ¨ Redness and swelling in your leg or groin.    Watch closely for changes in your health, and be sure to contact your doctor if you have any problems. Where can you learn more? Go to http://navi-zofia.info/. Enter F656 in the search box to learn more about \"Polycythemia: Care Instructions. \"  Current as of: October 9, 2017  Content Version: 11.7  © 1202-7765 [employer]. Care instructions adapted under license by [employer] (which disclaims liability or warranty for this information). If you have questions about a medical condition or this instruction, always ask your healthcare professional. [place] [address] any warranty or liability for your use of this information.

## 2018-07-20 LAB — 25(OH)D3 SERPL-MCNC: 32.2 NG/ML (ref 30–100)

## 2018-07-31 NOTE — PROGRESS NOTES
Ms. Aguirreie Lenora was called and made aware, that her OPIC appointment scheduled for Thursday, 8-2-18 has been changed from 1300 to 1500, and she agreed to the time change.

## 2018-08-01 NOTE — PROGRESS NOTES
Ms. Africa Saul was called and made aware that her OPIC appointment that was scheduled at 1500 at the [facility] on tomorrow, Thursday, 8-2-18, has been changed to 1500 at the [place] [address] location, and she agreed to the location change.

## 2018-08-02 ENCOUNTER — HOSPITAL ENCOUNTER (OUTPATIENT)
Dept: INFUSION THERAPY | Age: 38
Discharge: HOME | End: 2018-08-02
Payer: COMMERCIAL

## 2018-08-02 VITALS
RESPIRATION RATE: 16 BRPM | DIASTOLIC BLOOD PRESSURE: 76 MMHG | TEMPERATURE: 97.8 F | SYSTOLIC BLOOD PRESSURE: 120 MMHG | HEART RATE: 81 BPM

## 2018-08-02 LAB
BASO+EOS+MONOS # BLD AUTO: 0.7 K/UL (ref 0–2.3)
BASO+EOS+MONOS # BLD AUTO: 10 % (ref 0.1–17)
DIFFERENTIAL METHOD BLD: NORMAL
ERYTHROCYTE [DISTWIDTH] IN BLOOD BY AUTOMATED COUNT: 12.9 % (ref 11.5–14.5)
HCT VFR BLD AUTO: 42.3 % (ref 36–48)
HGB BLD-MCNC: 14.5 G/DL (ref 12–16)
LYMPHOCYTES # BLD: 1.6 K/UL (ref 1.1–5.9)
LYMPHOCYTES NFR BLD: 22 % (ref 14–44)
MCH RBC QN AUTO: 32.8 PG (ref 25–35)
MCHC RBC AUTO-ENTMCNC: 34.3 G/DL (ref 31–37)
MCV RBC AUTO: 95.7 FL (ref 78–102)
NEUTS SEG # BLD: 4.7 K/UL (ref 1.8–9.5)
NEUTS SEG NFR BLD: 68 % (ref 40–70)
PLATELET # BLD AUTO: 174 K/UL (ref 140–440)
RBC # BLD AUTO: 4.42 M/UL (ref 4.1–5.1)
WBC # BLD AUTO: 7 K/UL (ref 4.5–13)

## 2018-08-02 PROCEDURE — 36415 COLL VENOUS BLD VENIPUNCTURE: CPT

## 2018-08-02 PROCEDURE — 85025 COMPLETE CBC W/AUTO DIFF WBC: CPT | Performed by: INTERNAL MEDICINE

## 2018-08-02 PROCEDURE — 99195 PHLEBOTOMY: CPT

## 2018-08-02 PROCEDURE — 74011250636 HC RX REV CODE- 250/636: Performed by: INTERNAL MEDICINE

## 2018-08-02 RX ORDER — SODIUM CHLORIDE 9 MG/ML
500 INJECTION, SOLUTION INTRAVENOUS ONCE
Status: COMPLETED | OUTPATIENT
Start: 2018-08-02 | End: 2018-08-02

## 2018-08-02 RX ADMIN — SODIUM CHLORIDE 500 ML: 900 INJECTION, SOLUTION INTRAVENOUS at 16:10

## 2018-08-02 NOTE — PROGRESS NOTES
[facility] OPIC Progress Note    Date: 2018    Name: Brianna Billy    MRN: [id number]         : 1980      Ms. Campbell Jiang arrived in the [facility] today at , in stable condition, here for CBC/Phlebotomy (Weekly Status). She was assessed and education was provided. Ms. Shana Cook vitals were reviewed. Visit Vitals    /88 (BP 1 Location: Left arm, BP Patient Position: At rest;Sitting)    Pulse 92    Temp 97.9 °F (36.6 °C)    Resp 16    Breastfeeding No         CBC was drawn from her left AC at 1520, without incident. Lab results were obtained and reviewed. Recent Results (from the past 12 hour(s))   CBC WITH 3 PART DIFF    Collection Time: 18  3:20 PM   Result Value Ref Range    WBC 7.0 4.5 - 13.0 K/uL    RBC 4.42 4.10 - 5.10 M/uL    HGB 14.5 12.0 - 16 g/dL    HCT 42.3 36 - 48 %    MCV 95.7 78 - 102 FL    MCH 32.8 25.0 - 35.0 PG    MCHC 34.3 31 - 37 g/dL    RDW 12.9 11.5 - 14.5 %    PLATELET 938 604 - 563 K/uL    NEUTROPHILS 68 40 - 70 %    MIXED CELLS 10 0.1 - 17 %    LYMPHOCYTES 22 14 - 44 %    ABS. NEUTROPHILS 4.7 1.8 - 9.5 K/UL    ABS. MIXED CELLS 0.7 0.0 - 2.3 K/uL    ABS. LYMPHOCYTES 1.6 1.1 - 5.9 K/UL    DF AUTOMATED               Phlebotomy was indicated today, per order, for HCT still > 42.0. PIV was established in her left AC at 1545 without incident, and phlebotomy was started. Snack was offered and provided. Phlebotomy was completed without incident at 1610, after having obtained 500 ml blood, per order.  ml IV Bolus, was administered post phlebotomy per order, and also without incident. After the completion of the IV NS Bolus, the PIV was removed and gauze/bandaid & Coban was applied. Ms. Campbell Jiang tolerated well, and had no complaints. Ms. Campbell Jiang was discharged from [address] in stable condition at [phone].  She is to return on next Thursday, [facility]), at 1500,  for her next appointment, for CBC/Phlebotomy (Weekly Status).     Ollie Ortega RN  August 2, 2018  4:03 PM

## 2018-08-03 NOTE — PROGRESS NOTES
Ms. Alejandra Zane was called and made aware, that her OPIC appointment that was scheduled for next Thursday, 8-9-18 at 1500 at the [facility] has been changed to the [place] [address] location, and she agreed to the location change.

## 2018-08-09 ENCOUNTER — HOSPITAL ENCOUNTER (OUTPATIENT)
Dept: INFUSION THERAPY | Age: 38
Discharge: HOME | End: 2018-08-09
Payer: COMMERCIAL

## 2018-08-09 VITALS
SYSTOLIC BLOOD PRESSURE: 131 MMHG | DIASTOLIC BLOOD PRESSURE: 84 MMHG | HEART RATE: 88 BPM | OXYGEN SATURATION: 100 % | TEMPERATURE: 97.5 F | RESPIRATION RATE: 20 BRPM

## 2018-08-09 LAB
BASO+EOS+MONOS # BLD AUTO: 0.8 K/UL (ref 0–2.3)
BASO+EOS+MONOS # BLD AUTO: 14 % (ref 0.1–17)
DIFFERENTIAL METHOD BLD: ABNORMAL
ERYTHROCYTE [DISTWIDTH] IN BLOOD BY AUTOMATED COUNT: 13 % (ref 11.5–14.5)
HCT VFR BLD AUTO: 36.9 % (ref 36–48)
HGB BLD-MCNC: 12.8 G/DL (ref 12–16)
LYMPHOCYTES # BLD: 1.6 K/UL (ref 1.1–5.9)
LYMPHOCYTES NFR BLD: 29 % (ref 14–44)
MCH RBC QN AUTO: 33.3 PG (ref 25–35)
MCHC RBC AUTO-ENTMCNC: 34.7 G/DL (ref 31–37)
MCV RBC AUTO: 96.1 FL (ref 78–102)
NEUTS SEG # BLD: 3.2 K/UL (ref 1.8–9.5)
NEUTS SEG NFR BLD: 57 % (ref 40–70)
PLATELET # BLD AUTO: 183 K/UL (ref 140–440)
RBC # BLD AUTO: 3.84 M/UL (ref 4.1–5.1)
WBC # BLD AUTO: 5.6 K/UL (ref 4.5–13)

## 2018-08-09 PROCEDURE — 85025 COMPLETE CBC W/AUTO DIFF WBC: CPT | Performed by: INTERNAL MEDICINE

## 2018-08-09 NOTE — PROGRESS NOTES
[facility] OPIC Progress Note    Date: 2018    Name: Fabiola Shetty    MRN: [id number]         : 1980      Ms. Jules Taylor was assessed and education was provided. Ms. Carrol Lundborg vitals were reviewed and patient was observed for 5 minutes prior to treatment. Visit Vitals    /84 (BP 1 Location: Left arm, BP Patient Position: At rest)    Pulse 88    Temp 97.5 °F (36.4 °C)    Resp 20    SpO2 100%       Lab results were obtained via venipuncture to left ac x1 attempt and reviewed. Recent Results (from the past 12 hour(s))   CBC WITH 3 PART DIFF    Collection Time: 18  3:28 PM   Result Value Ref Range    WBC 5.6 4.5 - 13.0 K/uL    RBC 3.84 (L) 4.10 - 5.10 M/uL    HGB 12.8 12.0 - 16 g/dL    HCT 36.9 36 - 48 %    MCV 96.1 78 - 102 FL    MCH 33.3 25.0 - 35.0 PG    MCHC 34.7 31 - 37 g/dL    RDW 13.0 11.5 - 14.5 %    PLATELET 887 934 - 558 K/uL    NEUTROPHILS 57 40 - 70 %    MIXED CELLS 14 0.1 - 17 %    LYMPHOCYTES 29 14 - 44 %    ABS. NEUTROPHILS 3.2 1.8 - 9.5 K/UL    ABS. MIXED CELLS 0.8 0.0 - 2.3 K/uL    ABS. LYMPHOCYTES 1.6 1.1 - 5.9 K/UL    DF AUTOMATED         Today's HCT = 36.9. Therapeutic phlebotomy held today. Patient armband removed and shredded. Ms. Jules Taylor was discharged from [place] [address] in stable condition at [phone]. She is to return on 18 at 1500 for her next appointment for cbc/phlebtomy.       Daniel Carty RN  2018

## 2018-08-16 ENCOUNTER — APPOINTMENT (OUTPATIENT)
Dept: INFUSION THERAPY | Age: 38
End: 2018-08-16
Payer: COMMERCIAL

## 2018-08-30 ENCOUNTER — APPOINTMENT (OUTPATIENT)
Dept: INFUSION THERAPY | Age: 38
End: 2018-08-30
Payer: COMMERCIAL

## 2018-09-13 ENCOUNTER — OFFICE VISIT (OUTPATIENT)
Dept: FAMILY MEDICINE CLINIC | Age: 38
End: 2018-09-13

## 2018-09-13 VITALS
HEART RATE: 86 BPM | WEIGHT: 186.6 LBS | TEMPERATURE: 98.8 F | BODY MASS INDEX: 28.28 KG/M2 | HEIGHT: 68 IN | SYSTOLIC BLOOD PRESSURE: 118 MMHG | OXYGEN SATURATION: 98 % | DIASTOLIC BLOOD PRESSURE: 82 MMHG | RESPIRATION RATE: 12 BRPM

## 2018-09-13 DIAGNOSIS — Z13.220 SCREENING FOR LIPID DISORDERS: ICD-10-CM

## 2018-09-13 DIAGNOSIS — F41.9 ANXIETY: Primary | ICD-10-CM

## 2018-09-13 DIAGNOSIS — J30.1 CHRONIC SEASONAL ALLERGIC RHINITIS DUE TO POLLEN: ICD-10-CM

## 2018-09-13 DIAGNOSIS — Z13.228 SCREENING FOR ENDOCRINE, METABOLIC AND IMMUNITY DISORDER: ICD-10-CM

## 2018-09-13 DIAGNOSIS — Z13.0 SCREENING FOR ENDOCRINE, METABOLIC AND IMMUNITY DISORDER: ICD-10-CM

## 2018-09-13 DIAGNOSIS — Z12.39 SCREENING FOR BREAST CANCER: ICD-10-CM

## 2018-09-13 DIAGNOSIS — Z13.29 SCREENING FOR ENDOCRINE, METABOLIC AND IMMUNITY DISORDER: ICD-10-CM

## 2018-09-13 RX ORDER — MONTELUKAST SODIUM 10 MG/1
10 TABLET ORAL DAILY
Qty: 30 TAB | Refills: 5 | Status: SHIPPED | OUTPATIENT
Start: 2018-09-13 | End: 2019-02-28

## 2018-09-13 RX ORDER — ALPRAZOLAM 0.5 MG/1
TABLET ORAL
Qty: 30 TAB | Refills: 0 | Status: SHIPPED | OUTPATIENT
Start: 2018-09-13 | End: 2018-12-13 | Stop reason: SDUPTHER

## 2018-09-13 NOTE — PATIENT INSTRUCTIONS

## 2018-09-13 NOTE — PROGRESS NOTES
Chief Complaint   Patient presents with    Anxiety    Medication Refill     Patient is not fasting. Patient in room # 5.     1. Have you been to the ER, urgent care clinic since your last visit? Hospitalized since your last visit? No    2. Have you seen or consulted any other health care providers outside of the [address] [place] [address] since your last visit? Include any pap smears or colon screening. No     Reviewed. Flu on hold  Flowsheet, Learning needs and PHQ completed  PHQ over the last two weeks 9/13/2018   Little interest or pleasure in doing things Not at all   Feeling down, depressed, irritable, or hopeless Not at all   Total Score PHQ 2 0     Requested Prescriptions     Pending Prescriptions Disp Refills    montelukast (SINGULAIR) 10 mg tablet 30 Tab 0     Sig: Take 1 Tab by mouth daily.     ALPRAZolam (XANAX) 0.5 mg tablet 30 Tab 0     Sig: Take 1/2 to 1 tab daily as needed for anxiety MDD: 1 tab
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
## 2018-09-13 NOTE — MR AVS SNAPSHOT
[address] [facility] [address] [address] [facility] 
[phone] Patient: Kermit Caballero MRN: [id number] [facility]:7/48/8340 Visit Information Date & Time Provider Department Dept. Phone Encounter #  
 9/13/2018  3:00 PM Jacqueline Mak, [address] [id number] Follow-up Instructions Return in about 3 months (around 12/13/2018) for CPE with breast exam.  
  
Your Appointments 11/15/2018  2:00 PM  
Nurse Visit with Kati Lamar NP  
[address]  [address] [facility]) Appt Note: [address] [facility] [address] [facility] [address]  
[phone]  
  
   
 [facility] [address] Upcoming Health Maintenance Date Due Pneumococcal 19-64 Medium Risk (1 of 1 - PPSV23) 5/30/1997 Influenza Age 5 to Adult 12/12/2018* PAP AKA CERVICAL CYTOLOGY 6/13/2020 DTaP/Tdap/Td series (2 - Td) 12/20/2022 *Topic was postponed. The date shown is not the original due date. Allergies as of 9/13/2018  Review Complete On: 9/13/2018 By: MARKUS Daniel No Known Allergies Current Immunizations  Reviewed on 8/2/2018 Name Date Influenza Vaccine 1/18/2018, 9/12/2015 Tdap 12/20/2012 Not reviewed this visit You Were Diagnosed With   
  
 Codes Comments Anxiety    -  Primary ICD-10-CM: F41.9 ICD-9-CM: 300.00 Chronic seasonal allergic rhinitis due to pollen     ICD-10-CM: J30.1 ICD-9-CM: 477.0 Screening for lipid disorders     ICD-10-CM: Z13.220 ICD-9-CM: V77.91 Screening for endocrine, metabolic and immunity disorder     ICD-10-CM: Z13.29, Z13.228, Z13.0 ICD-9-CM: V77.99 Screening for breast cancer     ICD-10-CM: Z12.31 
ICD-9-CM: V76.10 Vitals BP Pulse Temp Resp Height(growth percentile) Weight(growth percentile) 118/82 (BP 1 Location: Left arm, BP Patient Position: Sitting) 86 98.8 °F (37.1 °C) (Oral) 12 5' 8\" (1.727 m) 186 lb 9.6 oz (84.6 kg) LMP SpO2 BMI OB Status Smoking Status 10/29/2014 98% 28.37 kg/m2 Hysterectomy Current Every Day Smoker Vitals History BMI and BSA Data Body Mass Index Body Surface Area  
 28.37 kg/m 2 2.01 m 2 Preferred Pharmacy Pharmacy Name Phone [facility]/PHARMACY #[id number] Karlie Staley, [facility] [place] [phone] Your Updated Medication List  
  
   
This list is accurate as of 9/13/18  3:39 PM.  Always use your most recent med list.  
  
  
  
  
 ALPRAZolam 0.5 mg tablet Commonly known as:  Almaz Ana Take 1/2 to 1 tab daily as needed for anxiety MDD: 1 tab  
  
 dicyclomine 20 mg tablet Commonly known as:  BENTYL Take 20 mg by mouth every six (6) hours. fexofenadine 180 mg tablet Commonly known as:  José Miguel Tom Take 1 Tab by mouth daily. montelukast 10 mg tablet Commonly known as:  SINGULAIR Take 1 Tab by mouth daily. naproxen 500 mg tablet Commonly known as:  NAPROSYN Take 1 Tab by mouth every twelve (12) hours as needed. Prescriptions Printed Refills  
 montelukast (SINGULAIR) 10 mg tablet 5 Sig: Take 1 Tab by mouth daily. Class: Print Route: Oral  
 ALPRAZolam (XANAX) 0.5 mg tablet 0 Sig: Take 1/2 to 1 tab daily as needed for anxiety MDD: 1 tab Class: Print Follow-up Instructions Return in about 3 months (around 12/13/2018) for CPE with breast exam. To-Do List   
 09/13/2018 Imaging:  ANNA MAMMO BI SCREENING INCL CAD   
  
 09/13/2018 Lab:  METABOLIC PANEL, COMPREHENSIVE Around 09/14/2018 Lab:  LIPID PANEL   
  
 09/20/2018  3:00 PM  
  Appointment with [address] [place] [address] [address] at [place] [address] ([phone]) Patient Instructions A Healthy Lifestyle: Care Instructions Your Care Instructions A healthy lifestyle can help you feel good, stay at a healthy weight, and have plenty of energy for both work and play. A healthy lifestyle is something you can share with your whole family. A healthy lifestyle also can lower your risk for serious health problems, such as high blood pressure, heart disease, and diabetes. You can follow a few steps listed below to improve your health and the health of your family. Follow-up care is a key part of your treatment and safety. Be sure to make and go to all appointments, and call your doctor if you are having problems. It's also a good idea to know your test results and keep a list of the medicines you take. How can you care for yourself at home? · Do not eat too much sugar, fat, or fast foods. You can still have dessert and treats now and then. The goal is moderation. · Start small to improve your eating habits. Pay attention to portion sizes, drink less juice and soda pop, and eat more fruits and vegetables. ¨ Eat a healthy amount of food. A 3-ounce serving of meat, for example, is about the size of a deck of cards. Fill the rest of your plate with vegetables and whole grains. ¨ Limit the amount of soda and sports drinks you have every day. Drink more water when you are thirsty. ¨ Eat at least 5 servings of fruits and vegetables every day. It may seem like a lot, but it is not hard to reach this goal. A serving or helping is 1 piece of fruit, 1 cup of vegetables, or 2 cups of leafy, raw vegetables. Have an apple or some carrot sticks as an afternoon snack instead of a candy bar. Try to have fruits and/or vegetables at every meal. 
· Make exercise part of your daily routine. You may want to start with simple activities, such as walking, bicycling, or slow swimming. Try to be active 30 to 60 minutes every day. You do not need to do all 30 to 60 minutes all at once.  For example, you can exercise 3 times a day for 10 or 20 minutes. Moderate exercise is safe for most people, but it is always a good idea to talk to your doctor before starting an exercise program. 
· Keep moving. Mandan Parody the lawn, work in the garden, or [employer]. Take the stairs instead of the elevator at work. · If you smoke, quit. People who smoke have an increased risk for heart attack, stroke, cancer, and other lung illnesses. Quitting is hard, but there are ways to boost your chance of quitting tobacco for good. ¨ Use nicotine gum, patches, or lozenges. ¨ Ask your doctor about stop-smoking programs and medicines. ¨ Keep trying. In addition to reducing your risk of diseases in the future, you will notice some benefits soon after you stop using tobacco. If you have shortness of breath or asthma symptoms, they will likely get better within a few weeks after you quit. · Limit how much alcohol you drink. Moderate amounts of alcohol (up to 2 drinks a day for men, 1 drink a day for women) are okay. But drinking too much can lead to liver problems, high blood pressure, and other health problems. Family health If you have a family, there are many things you can do together to improve your health. · Eat meals together as a family as often as possible. · Eat healthy foods. This includes fruits, vegetables, lean meats and dairy, and whole grains. · Include your family in your fitness plan. Most people think of activities such as jogging or tennis as the way to fitness, but there are many ways you and your family can be more active. Anything that makes you breathe hard and gets your heart pumping is exercise. Here are some tips: 
¨ Walk to do errands or to take your child to school or the bus. ¨ Go for a family bike ride after dinner instead of watching TV. Where can you learn more? Go to http://navi-zofia.info/. Enter B107 in the search box to learn more about \"A Healthy Lifestyle: Care Instructions. \" Current as of: December 7, 2017 Content Version: 11.7 © 1902-4939 [employer], [place]. Care instructions adapted under license by [employer] (which disclaims liability or warranty for this information). If you have questions about a medical condition or this instruction, always ask your healthcare professional. [address] any warranty or liability for your use of this information. Introducing [facility]! Dear Karen Alert: Thank you for requesting a edPULSE account. Our records indicate that you already have an active edPULSE account. You can access your account anytime at [email or web address] Did you know that you can access your hospital and ER discharge instructions at any time in edPULSE? You can also review all of your test results from your hospital stay or ER visit. Additional Information If you have questions, please visit the Frequently Asked Questions section of the edPULSE website at [email or web address] Remember, edPULSE is NOT to be used for urgent needs. For medical emergencies, dial 911. Now available from your iPhone and Android! Please provide this summary of care documentation to your next provider. Your primary care clinician is listed as Randye Merlin. If you have any questions after today's visit, please call [phone].

## 2018-09-20 ENCOUNTER — HOSPITAL ENCOUNTER (OUTPATIENT)
Dept: ONCOLOGY | Age: 38
Discharge: HOME | End: 2018-09-20

## 2018-09-20 ENCOUNTER — HOSPITAL ENCOUNTER (OUTPATIENT)
Dept: INFUSION THERAPY | Age: 38
Discharge: HOME | End: 2018-09-20
Payer: COMMERCIAL

## 2018-09-20 ENCOUNTER — CLINICAL SUPPORT (OUTPATIENT)
Dept: ONCOLOGY | Age: 38
End: 2018-09-20

## 2018-09-20 VITALS
DIASTOLIC BLOOD PRESSURE: 90 MMHG | RESPIRATION RATE: 16 BRPM | HEART RATE: 90 BPM | SYSTOLIC BLOOD PRESSURE: 139 MMHG | TEMPERATURE: 98.5 F

## 2018-09-20 DIAGNOSIS — D75.1 SECONDARY POLYCYTHEMIA: ICD-10-CM

## 2018-09-20 DIAGNOSIS — D75.1 SECONDARY POLYCYTHEMIA: Primary | ICD-10-CM

## 2018-09-20 LAB
BASO+EOS+MONOS # BLD AUTO: 0.9 K/UL (ref 0–2.3)
BASO+EOS+MONOS # BLD AUTO: 14 % (ref 0.1–17)
DIFFERENTIAL METHOD BLD: NORMAL
ERYTHROCYTE [DISTWIDTH] IN BLOOD BY AUTOMATED COUNT: 11.5 % (ref 11.5–14.5)
HCT VFR BLD AUTO: 40.5 % (ref 36–48)
HGB BLD-MCNC: 13.6 G/DL (ref 12–16)
LYMPHOCYTES # BLD: 1.9 K/UL (ref 1.1–5.9)
LYMPHOCYTES NFR BLD: 29 % (ref 14–44)
MCH RBC QN AUTO: 31.3 PG (ref 25–35)
MCHC RBC AUTO-ENTMCNC: 33.6 G/DL (ref 31–37)
MCV RBC AUTO: 93.3 FL (ref 78–102)
NEUTS SEG # BLD: 3.6 K/UL (ref 1.8–9.5)
NEUTS SEG NFR BLD: 57 % (ref 40–70)
PLATELET # BLD AUTO: 194 K/UL (ref 140–440)
RBC # BLD AUTO: 4.34 M/UL (ref 4.1–5.1)
WBC # BLD AUTO: 6.4 K/UL (ref 4.5–13)

## 2018-09-20 PROCEDURE — 36415 COLL VENOUS BLD VENIPUNCTURE: CPT

## 2018-09-20 NOTE — PROGRESS NOTES
[facility] Progress Note    Date: 2018    Name: Michelle Hensley    MRN: [id number]         : 1980      Ms. Cathy Gutierrez was assessed and education was provided. Ms. Shola Duckworth vitals were reviewed and patient was observed for 5 minutes prior to treatment. Visit Vitals    /90 (BP 1 Location: Left arm, BP Patient Position: At rest;Sitting)    Pulse 90    Temp 98.5 °F (36.9 °C)    Resp 16    Breastfeeding No       Lab results were obtained and reviewed. Recent Results (from the past 12 hour(s))   CBC WITH 3 PART DIFF    Collection Time: 18  3:19 PM   Result Value Ref Range    WBC 6.4 4.5 - 13.0 K/uL    RBC 4.34 4.10 - 5.10 M/uL    HGB 13.6 12.0 - 16.0 g/dL    HCT 40.5 36 - 48 %    MCV 93.3 78 - 102 FL    MCH 31.3 25.0 - 35.0 PG    MCHC 33.6 31 - 37 g/dL    RDW 11.5 11.5 - 14.5 %    PLATELET 109 820 - 418 K/uL    NEUTROPHILS 57 40 - 70 %    MIXED CELLS 14 0.1 - 17 %    LYMPHOCYTES 29 14 - 44 %    ABS. NEUTROPHILS 3.6 1.8 - 9.5 K/UL    ABS. MIXED CELLS 0.9 0.0 - 2.3 K/uL    ABS. LYMPHOCYTES 1.9 1.1 - 5.9 K/UL    DF AUTOMATED         Therapeutic phlebotomy not done for HCT 40.5. Patient armband removed and shredded. Ms. Cathy Gutierrez was discharged from [place] [address] in stable condition at [phone]. She is to return on 18 at 1400 for her next appointment for CBC/Phlebotomy Q 6 wks.     Lennie Chapman RN  2018  3:35 PM

## 2018-09-27 ENCOUNTER — HOSPITAL ENCOUNTER (OUTPATIENT)
Dept: MAMMOGRAPHY | Age: 38
Discharge: HOME | End: 2018-09-27
Attending: PHYSICIAN ASSISTANT
Payer: COMMERCIAL

## 2018-09-27 DIAGNOSIS — Z12.39 SCREENING FOR BREAST CANCER: ICD-10-CM

## 2018-09-27 PROCEDURE — 77063 BREAST TOMOSYNTHESIS BI: CPT

## 2018-10-19 ENCOUNTER — OFFICE VISIT (OUTPATIENT)
Dept: FAMILY MEDICINE CLINIC | Age: 38
End: 2018-10-19

## 2018-10-19 ENCOUNTER — HOSPITAL ENCOUNTER (OUTPATIENT)
Dept: GENERAL RADIOLOGY | Age: 38
Discharge: HOME | End: 2018-10-19
Payer: COMMERCIAL

## 2018-10-19 VITALS
SYSTOLIC BLOOD PRESSURE: 132 MMHG | HEART RATE: 90 BPM | TEMPERATURE: 98.1 F | WEIGHT: 185 LBS | HEIGHT: 68 IN | BODY MASS INDEX: 28.04 KG/M2 | OXYGEN SATURATION: 98 % | DIASTOLIC BLOOD PRESSURE: 86 MMHG | RESPIRATION RATE: 16 BRPM

## 2018-10-19 DIAGNOSIS — R05.9 COUGH: ICD-10-CM

## 2018-10-19 DIAGNOSIS — F17.200 SMOKING: ICD-10-CM

## 2018-10-19 DIAGNOSIS — R09.89 CHEST CONGESTION: ICD-10-CM

## 2018-10-19 DIAGNOSIS — R05.9 COUGH: Primary | ICD-10-CM

## 2018-10-19 PROCEDURE — 71046 X-RAY EXAM CHEST 2 VIEWS: CPT

## 2018-10-19 RX ORDER — ALBUTEROL SULFATE 90 UG/1
2 AEROSOL, METERED RESPIRATORY (INHALATION)
Qty: 1 INHALER | Refills: 0 | Status: SHIPPED | OUTPATIENT
Start: 2018-10-19 | End: 2019-02-28

## 2018-10-19 RX ORDER — PREDNISONE 20 MG/1
20 TABLET ORAL
Qty: 7 TAB | Refills: 0 | Status: SHIPPED | OUTPATIENT
Start: 2018-10-19 | End: 2018-12-13

## 2018-10-19 RX ORDER — AZITHROMYCIN 250 MG/1
TABLET, FILM COATED ORAL
Qty: 6 TAB | Refills: 0 | Status: SHIPPED | OUTPATIENT
Start: 2018-10-19 | End: 2018-10-24

## 2018-10-19 NOTE — PATIENT INSTRUCTIONS
Bronchitis: Care Instructions  Your Care Instructions    Bronchitis is inflammation of the bronchial tubes, which carry air to the lungs. The tubes swell and produce mucus, or phlegm. The mucus and inflamed bronchial tubes make you cough. You may have trouble breathing. Most cases of bronchitis are caused by viruses like those that cause colds. Antibiotics usually do not help and they may be harmful. Bronchitis usually develops rapidly and lasts about 2 to 3 weeks in otherwise healthy people. Follow-up care is a key part of your treatment and safety. Be sure to make and go to all appointments, and call your doctor if you are having problems. It's also a good idea to know your test results and keep a list of the medicines you take. How can you care for yourself at home? · Take all medicines exactly as prescribed. Call your doctor if you think you are having a problem with your medicine. · Get some extra rest.  · Take an over-the-counter pain medicine, such as acetaminophen (Tylenol), ibuprofen (Advil, Motrin), or naproxen (Aleve) to reduce fever and relieve body aches. Read and follow all instructions on the label. · Do not take two or more pain medicines at the same time unless the doctor told you to. Many pain medicines have acetaminophen, which is Tylenol. Too much acetaminophen (Tylenol) can be harmful. · Take an over-the-counter cough medicine that contains dextromethorphan to help quiet a dry, hacking cough so that you can sleep. Avoid cough medicines that have more than one active ingredient. Read and follow all instructions on the label. · Breathe moist air from a humidifier, hot shower, or sink filled with hot water. The heat and moisture will thin mucus so you can cough it out. · Do not smoke. Smoking can make bronchitis worse. If you need help quitting, talk to your doctor about stop-smoking programs and medicines. These can increase your chances of quitting for good.   When should you call for help? Call 911 anytime you think you may need emergency care. For example, call if:    · You have severe trouble breathing.    Call your doctor now or seek immediate medical care if:    · You have new or worse trouble breathing.     · You cough up dark brown or bloody mucus (sputum).     · You have a new or higher fever.     · You have a new rash.    Watch closely for changes in your health, and be sure to contact your doctor if:    · You cough more deeply or more often, especially if you notice more mucus or a change in the color of your mucus.     · You are not getting better as expected. Where can you learn more? Go to http://navi-zofia.info/. Enter H333 in the search box to learn more about \"Bronchitis: Care Instructions. \"  Current as of: December 6, 2017  Content Version: 11.8  © 4269-0033 [email or web address]. Care instructions adapted under license by [employer] (which disclaims liability or warranty for this information). If you have questions about a medical condition or this instruction, always ask your healthcare professional. [address] any warranty or liability for your use of this information.

## 2018-10-19 NOTE — PROGRESS NOTES
Chief Complaint   Patient presents with    Other     tighness in chest    Neck Pain     throughout chest and shoulder and neck     1. Have you been to the ER, urgent care clinic since your last visit? Hospitalized since your last visit? No    2. Have you seen or consulted any other health care providers outside of the [facility] since your last visit? Include any pap smears or colon screening.  No

## 2018-10-19 NOTE — PROGRESS NOTES
HISTORY OF PRESENT ILLNESS  Iftikhar Morley is a 34 y.o. female. HPI: ro Lemos pt. Here with c/o chest congestion, dry cough, wheezing since last couple of days. She does smoke but said it feels different than routine congestion so decided to come to the office. Sitting comfortable. Not using any extra respiratory muscle. Able to talk in full sentence. No runny nose, no fever. No audible wheezing. Vitals fairly stable including oxygen saturation. Denies any headache ,dizziness, no nausea or vomiting. No abdominal pain. No urinary or bowel complains. No chest pain or sob. No h/o asthma or copd. Not taking any inhaler routinely,. She is feeling generalize fatigue. No sick contact   Visit Vitals  /86 (BP 1 Location: Left arm, BP Patient Position: Sitting)   Pulse 90   Temp 98.1 °F (36.7 °C) (Oral)   Resp 16   Ht 5' 8\" (1.727 m)   Wt 185 lb (83.9 kg)   SpO2 98%   BMI 28.13 kg/m²     Review medication list, vitals, problem list,allergies. ROS: see HPI     Physical Exam   Constitutional: She is oriented to person, place, and time. No distress. Cardiovascular: Normal heart sounds. Pulmonary/Chest: No respiratory distress. She has wheezes. Abdominal: Soft. There is no tenderness. Musculoskeletal: She exhibits no edema. Lymphadenopathy:     She has no cervical adenopathy. Neurological: She is oriented to person, place, and time. Psychiatric: Her behavior is normal.       ASSESSMENT and PLAN    ICD-10-CM ICD-9-CM    1. Cough: ? Reactive airway disease/ bronchitis. For now giving symptomatic treatment with albuterol and prednisone. If any worsening of symptoms advised her to go to ER. Obtain chest x-ray. R05 786.2 predniSONE (DELTASONE) 20 mg tablet      albuterol (PROVENTIL HFA, VENTOLIN HFA, PROAIR HFA) 90 mcg/actuation inhaler      XR CHEST PA LAT   2.  Chest congestion: see above  R09.89 786.9 predniSONE (DELTASONE) 20 mg tablet      albuterol (PROVENTIL HFA, VENTOLIN HFA, PROAIR HFA) 90 mcg/actuation inhaler      XR CHEST PA LAT   3. Smoking F17.200 305.1 XR CHEST PA LAT   Pt understood and agree with the plan     Follow-up Disposition:  Return for monday .

## 2018-10-25 NOTE — PROGRESS NOTES
[facility] Progress Note    Date: 2018    Name: Conner Coffey    MRN: [id number]         : 1980          Ms. Sd Jackson called the [facility] today, and asked to cancel her [facility] appointment that was scheduled for next Thursday, 18, due to a scheduling conflict. The appointment was rescheduled per her request, to the following Thursday, 18 at 1500.               Winsome Ordonez RN  2018  12:08 PM

## 2018-10-31 NOTE — PROGRESS NOTES
Contacted patient and verified identity using name and date of birth (2- identifiers)  Spoke with patient and she verbalized understanding of negative CXR

## 2018-11-01 ENCOUNTER — HOSPITAL ENCOUNTER (OUTPATIENT)
Dept: INFUSION THERAPY | Age: 38
Discharge: HOME | End: 2018-11-01
Payer: COMMERCIAL

## 2018-11-08 ENCOUNTER — HOSPITAL ENCOUNTER (OUTPATIENT)
Dept: ONCOLOGY | Age: 38
Discharge: HOME | End: 2018-11-08

## 2018-11-08 ENCOUNTER — CLINICAL SUPPORT (OUTPATIENT)
Dept: ONCOLOGY | Age: 38
End: 2018-11-08

## 2018-11-08 ENCOUNTER — HOSPITAL ENCOUNTER (OUTPATIENT)
Dept: INFUSION THERAPY | Age: 38
Discharge: HOME | End: 2018-11-08
Payer: COMMERCIAL

## 2018-11-08 VITALS
SYSTOLIC BLOOD PRESSURE: 131 MMHG | HEART RATE: 88 BPM | DIASTOLIC BLOOD PRESSURE: 84 MMHG | RESPIRATION RATE: 18 BRPM | TEMPERATURE: 97 F | OXYGEN SATURATION: 100 %

## 2018-11-08 DIAGNOSIS — D75.1 POLYCYTHEMIA, SECONDARY: ICD-10-CM

## 2018-11-08 DIAGNOSIS — D75.1 POLYCYTHEMIA, SECONDARY: Primary | ICD-10-CM

## 2018-11-08 LAB
BASO+EOS+MONOS # BLD AUTO: 0.9 K/UL (ref 0–2.3)
BASO+EOS+MONOS # BLD AUTO: 14 % (ref 0.1–17)
DIFFERENTIAL METHOD BLD: NORMAL
ERYTHROCYTE [DISTWIDTH] IN BLOOD BY AUTOMATED COUNT: 12.5 % (ref 11.5–14.5)
HCT VFR BLD AUTO: 43.5 % (ref 36–48)
HGB BLD-MCNC: 14.3 G/DL (ref 12–16)
LYMPHOCYTES # BLD: 2.1 K/UL (ref 1.1–5.9)
LYMPHOCYTES NFR BLD: 33 % (ref 14–44)
MCH RBC QN AUTO: 30.5 PG (ref 25–35)
MCHC RBC AUTO-ENTMCNC: 32.9 G/DL (ref 31–37)
MCV RBC AUTO: 92.8 FL (ref 78–102)
NEUTS SEG # BLD: 3.3 K/UL (ref 1.8–9.5)
NEUTS SEG NFR BLD: 54 % (ref 40–70)
PLATELET # BLD AUTO: 179 K/UL (ref 140–440)
RBC # BLD AUTO: 4.69 M/UL (ref 4.1–5.1)
WBC # BLD AUTO: 6.3 K/UL (ref 4.5–13)

## 2018-11-08 PROCEDURE — 36415 COLL VENOUS BLD VENIPUNCTURE: CPT

## 2018-11-08 NOTE — PROGRESS NOTES
[facility] Progress Note    Date: 2018    Name: Fabiola Shetty    MRN: [id number]         : 1980      Ms. Jules Taylor was assessed and education was provided. Ms. Carrol Lundborg vitals were reviewed and patient was observed for 5 minutes prior to treatment. Visit Vitals  /84 (BP 1 Location: Left arm, BP Patient Position: Sitting)   Pulse 88   Temp 97 °F (36.1 °C)   Resp 18   SpO2 100%   Breastfeeding? No     CBC obtained from right AC x 1 attempt. Lab results were obtained and reviewed. Recent Results (from the past 12 hour(s))   CBC WITH 3 PART DIFF    Collection Time: 18  3:15 PM   Result Value Ref Range    WBC 6.3 4.5 - 13.0 K/uL    RBC 4.69 4.10 - 5.10 M/uL    HGB 14.3 12.0 - 16.0 g/dL    HCT 43.5 36 - 48 %    MCV 92.8 78 - 102 FL    MCH 30.5 25.0 - 35.0 PG    MCHC 32.9 31 - 37 g/dL    RDW 12.5 11.5 - 14.5 %    PLATELET 829 709 - 419 K/uL    NEUTROPHILS 54 40 - 70 %    MIXED CELLS 14 0.1 - 17 %    LYMPHOCYTES 33 14 - 44 %    ABS. NEUTROPHILS 3.3 1.8 - 9.5 K/UL    ABS. MIXED CELLS 0.9 0.0 - 2.3 K/uL    ABS. LYMPHOCYTES 2.1 1.1 - 5.9 K/UL    DF AUTOMATED         Therapeutic phlebotomy not done for HCT 43.5. Patient armband removed and shredded. Ms. Jules Taylor was discharged from [place] [address] in stable condition at 1525. She is to return on 18 at 1500 for her next appointment for CBC/Phlebotomy Q 6 wks.     Salma Howe  2018  3:35 PM

## 2018-11-15 DIAGNOSIS — R09.89 CHEST CONGESTION: ICD-10-CM

## 2018-11-15 DIAGNOSIS — R05.9 COUGH: ICD-10-CM

## 2018-11-15 RX ORDER — ALBUTEROL SULFATE 90 UG/1
AEROSOL, METERED RESPIRATORY (INHALATION)
Qty: 8.5 INHALER | Refills: 0 | OUTPATIENT
Start: 2018-11-15

## 2018-11-15 NOTE — TELEPHONE ENCOUNTER
Please find out if refill is needed. This was originally ordered by Dr. Stephanie Tinoco for an acute illness.  If patient is continuing to use albuterol inhaler, she should probably return for evaluation of possible asthma

## 2018-11-15 NOTE — TELEPHONE ENCOUNTER
Called home number. Verified name and . Spoke with patient. Notified of refill request. Per patient she did not request refill. Per pharmacy seems to request for her a lot. Patient had no further questions or concerns.

## 2018-12-10 ENCOUNTER — HOSPITAL ENCOUNTER (OUTPATIENT)
Dept: LAB | Age: 38
Discharge: HOME | End: 2018-12-10
Payer: COMMERCIAL

## 2018-12-10 LAB
ALBUMIN SERPL-MCNC: 3.9 G/DL (ref 3.4–5)
ALBUMIN/GLOB SERPL: 1.3 {RATIO} (ref 0.8–1.7)
ALP SERPL-CCNC: 83 U/L (ref 45–117)
ALT SERPL-CCNC: 24 U/L (ref 13–56)
ANION GAP SERPL CALC-SCNC: 5 MMOL/L (ref 3–18)
AST SERPL-CCNC: 19 U/L (ref 15–37)
BILIRUB SERPL-MCNC: 0.3 MG/DL (ref 0.2–1)
BUN SERPL-MCNC: 8 MG/DL (ref 7–18)
BUN/CREAT SERPL: 11 (ref 12–20)
CALCIUM SERPL-MCNC: 8.9 MG/DL (ref 8.5–10.1)
CHLORIDE SERPL-SCNC: 106 MMOL/L (ref 100–108)
CHOLEST SERPL-MCNC: 233 MG/DL
CO2 SERPL-SCNC: 28 MMOL/L (ref 21–32)
CREAT SERPL-MCNC: 0.73 MG/DL (ref 0.6–1.3)
GLOBULIN SER CALC-MCNC: 3.1 G/DL (ref 2–4)
GLUCOSE SERPL-MCNC: 74 MG/DL (ref 74–99)
HDLC SERPL-MCNC: 98 MG/DL (ref 40–60)
HDLC SERPL: 2.4 {RATIO} (ref 0–5)
LDLC SERPL CALC-MCNC: 122.8 MG/DL (ref 0–100)
LIPID PROFILE,FLP: ABNORMAL
POTASSIUM SERPL-SCNC: 5 MMOL/L (ref 3.5–5.5)
PROT SERPL-MCNC: 7 G/DL (ref 6.4–8.2)
SODIUM SERPL-SCNC: 139 MMOL/L (ref 136–145)
TRIGL SERPL-MCNC: 61 MG/DL (ref ?–150)
VLDLC SERPL CALC-MCNC: 12.2 MG/DL

## 2018-12-10 PROCEDURE — 80061 LIPID PANEL: CPT

## 2018-12-10 PROCEDURE — 80053 COMPREHEN METABOLIC PANEL: CPT

## 2018-12-10 PROCEDURE — 36415 COLL VENOUS BLD VENIPUNCTURE: CPT

## 2018-12-13 ENCOUNTER — OFFICE VISIT (OUTPATIENT)
Dept: FAMILY MEDICINE CLINIC | Age: 38
End: 2018-12-13

## 2018-12-13 VITALS
OXYGEN SATURATION: 98 % | BODY MASS INDEX: 28.67 KG/M2 | RESPIRATION RATE: 12 BRPM | TEMPERATURE: 98.5 F | DIASTOLIC BLOOD PRESSURE: 76 MMHG | HEIGHT: 68 IN | HEART RATE: 95 BPM | WEIGHT: 189.2 LBS | SYSTOLIC BLOOD PRESSURE: 126 MMHG

## 2018-12-13 DIAGNOSIS — J32.9 CHRONIC SINUSITIS, UNSPECIFIED LOCATION: ICD-10-CM

## 2018-12-13 DIAGNOSIS — Z00.00 ROUTINE PHYSICAL EXAMINATION: Primary | ICD-10-CM

## 2018-12-13 DIAGNOSIS — J30.9 CHRONIC ALLERGIC RHINITIS: ICD-10-CM

## 2018-12-13 DIAGNOSIS — F41.9 ANXIETY: ICD-10-CM

## 2018-12-13 DIAGNOSIS — Z12.39 SCREENING BREAST EXAMINATION: ICD-10-CM

## 2018-12-13 RX ORDER — ESCITALOPRAM OXALATE 5 MG/1
5 TABLET ORAL DAILY
Qty: 30 TAB | Refills: 0 | Status: SHIPPED | OUTPATIENT
Start: 2018-12-13 | End: 2018-12-14 | Stop reason: SINTOL

## 2018-12-13 RX ORDER — ALPRAZOLAM 0.5 MG/1
TABLET ORAL
Qty: 30 TAB | Refills: 0 | Status: SHIPPED | OUTPATIENT
Start: 2018-12-13

## 2018-12-13 NOTE — PROGRESS NOTES
Patient: Ciaran Durham MRN: [id number]  SSN: [id number]    YOB: 1980  Age: 34 y.o. Sex: female      Date of Service: 12/13/2018   Provider: MARKUS Beasley   Office Location:   [address] [place], [address]  Office Phone: [phone]  Office Fax: [phone]        REASON FOR VISIT:   Chief Complaint   Patient presents with    Complete Physical    Anxiety    Other     Breast Exam    Sinus Pain        VITALS:   Visit Vitals  /76 (BP 1 Location: Right arm, BP Patient Position: Sitting) Comment: manual   Pulse 95   Temp 98.5 °F (36.9 °C) (Oral)   Resp 12   Ht 5' 8\" (1.727 m)   Wt 189 lb 3.2 oz (85.8 kg)   LMP 10/29/2014   SpO2 98%   BMI 28.77 kg/m²       MEDICATIONS:   Current Outpatient Medications on File Prior to Visit   Medication Sig Dispense Refill    montelukast (SINGULAIR) 10 mg tablet Take 1 Tab by mouth daily. 30 Tab 5    ALPRAZolam (XANAX) 0.5 mg tablet Take 1/2 to 1 tab daily as needed for anxiety MDD: 1 tab 30 Tab 0    naproxen (NAPROSYN) 500 mg tablet Take 1 Tab by mouth every twelve (12) hours as needed. 180 Tab 0    dicyclomine (BENTYL) 20 mg tablet Take 20 mg by mouth every six (6) hours.  fexofenadine (ALLEGRA) 180 mg tablet Take 1 Tab by mouth daily. 90 Tab 3    predniSONE (DELTASONE) 20 mg tablet Take 1 Tab by mouth daily (with breakfast). 7 Tab 0    albuterol (PROVENTIL HFA, VENTOLIN HFA, PROAIR HFA) 90 mcg/actuation inhaler Take 2 Puffs by inhalation every four (4) hours as needed for Wheezing. 1 Inhaler 0     No current facility-administered medications on file prior to visit.          ALLERGIES:   No Known Allergies     PAST MEDICAL HISTORY:  Past Medical History:   Diagnosis Date    Discoid lupus     skin lupus    Hypertension     during pregnancy/6 months after    IBS (irritable bowel syndrome) 2016    Menorrhagia     hysterectomy    Polycythemia         SURGICAL HISTORY:  Past Surgical History:   Procedure Laterality Date    DELIVERY       HX DILATION AND CURETTAGE      HX GYN      laproscopy    HX HYSTERECTOMY          FAMILY HISTORY:  Family History   Problem Relation Age of Onset    Alcohol abuse Mother     Drug Abuse Mother    [place] Arthritis-rheumatoid Mother     Hypertension Father     Hypertension Sister     High Cholesterol Sister     Dementia Sister     Stroke Sister     Arthritis-rheumatoid Maternal Grandmother     Alcohol abuse Maternal Grandfather     Arthritis-rheumatoid Maternal Grandfather     Other Maternal Grandfather         ruptured bowel    Stroke Sister         SOCIAL HISTORY:  Social History     Socioeconomic History    Marital status:      Spouse name: Not on file    Number of children: Not on file    Years of education: Not on file    Highest education level: Not on file   Occupational History    Occupation:    Tobacco Use    Smoking status: Current Every Day Smoker     Packs/day: 0.50     Years: 15.00     Pack years: 7.50     Types: Cigarettes    Smokeless tobacco: Never Used    Tobacco comment:    Substance and Sexual Activity    Alcohol use: Yes     Alcohol/week: 0.0 oz     Types: 1 - 4 Glasses of wine per week     Comment: occ    Drug use: No    Sexual activity: Yes     Partners: Male     Birth control/protection: None        HEALTH MAINTENANCE:  Health Maintenance   Topic Date Due    Pneumococcal 19-64 Medium Risk (1 of 1 - PPSV23) 1997    Influenza Age 5 to Adult  2018    PAP AKA CERVICAL CYTOLOGY  2020    DTaP/Tdap/Td series (2 - Td) 2022           HPI:  Andre Huertas is a 34 y.o. female who presents to the office for a routine health maintenance exam.     Anxiety -   Patient feels anxiety worsening a bit lately. She typically does need her Xanax more around the holiday season.  She describes that she has always been somewhat of an anxious, \"Type A\" individual, but as she has gotten older, she experiences more of the physical manifestations of her anxiety, such as chest tightness and GI upset. She states she has been toying with the idea of trying a daily medication for anxiety, but goes back and forth on whether or not she needs it. Chronic Sinusitis -   Worsening as of late. Patient reports she has had essentially constant nasal congestion and sinus pressure since July of this year. None of her regular allergy meds seemed to be making a difference, so she has stopped taking them. She also uses a Neti pot without much relief. She wonders if it might be time to see an ENT. She is feeling a little bit worse today, thinks she might be coming down with a cold. Woke up with a sore throat this morning. REVIEW OF SYSTEMS:   Review of Systems   Constitutional: Negative for chills, fever and malaise/fatigue. HENT: Positive for congestion, ear pain, sinus pain and sore throat. Respiratory: Negative for cough, shortness of breath and wheezing. Cardiovascular: Negative for chest pain, palpitations and leg swelling. Gastrointestinal: Negative for abdominal pain, constipation, diarrhea, nausea and vomiting. Psychiatric/Behavioral: Negative for depression. The patient is nervous/anxious. The patient does not have insomnia. PHYSICAL EXAMINATION:   Physical Exam   Constitutional: She is oriented to person, place, and time and well-developed, well-nourished, and in no distress. HENT:   Head: Normocephalic and atraumatic. Right Ear: Tympanic membrane and external ear normal.   Left Ear: Tympanic membrane and external ear normal.   Nose: Nose normal.   Mouth/Throat: Uvula is midline, oropharynx is clear and moist and mucous membranes are normal.   Eyes: Conjunctivae are normal. Pupils are equal, round, and reactive to light. Right eye exhibits no discharge. Left eye exhibits no discharge. Neck: Neck supple. No thyromegaly present.    Cardiovascular: Normal rate, regular rhythm and normal heart sounds. Exam reveals no gallop and no friction rub. No murmur heard. Pulmonary/Chest: Effort normal and breath sounds normal. No stridor. She has no wheezes. She has no rales. Abdominal: Soft. Bowel sounds are normal. She exhibits no distension and no mass. There is no tenderness. There is no rebound and no guarding. Lymphadenopathy:     She has no cervical adenopathy. Neurological: She is alert and oriented to person, place, and time. Gait normal.   Skin: Skin is warm and dry. No rash noted. Psychiatric: Mood, memory and affect normal.   Breasts: Symmetric bilaterally without skin or nipple changes, tenderness, or masses    ASSESSMENT/PLAN:  Diagnoses and all orders for this visit:    1. Routine physical examination  - Essentially normal physical exam findings today  - Age and gender specific counseling provided   - Health screenings reviewed  - Flu shot declined today but she plans to get her flu shot at the pharmacy in the next few weeks     2. Screening breast examination  - Normal breast exam today  - Dense breast tissue  - Counseled on self exams  - Reviewed recent mammogram     3. Chronic sinusitis, unspecified location  4. Chronic allergic rhinitis  - Referred to ENT  Orders:    -     REFERRAL TO ENT-OTOLARYNGOLOGY    5. Anxiety  - Trial of low dose Lexapro as below  - Patient will take some time to consider whether or not she wants to get started on it  - If she does fill the rx, she will call or send a XMPie message with an update after a few weeks of therapy to let me know how she's doing  - may continue xanax PRN in the meantime   Orders:    -     escitalopram oxalate (LEXAPRO) 5 mg tablet; Take 1 Tab by mouth daily.  -     ALPRAZolam (XANAX) 0.5 mg tablet; Take 1/2 to 1 tab daily as needed for anxiety MDD: 1 tab            Follow-up Disposition:  Return in about 6 months (around 6/13/2019) for Anxiety.      MARKUS Munguia  12/13/2018   3:20 PM

## 2018-12-13 NOTE — PATIENT INSTRUCTIONS
A Healthy Lifestyle: Care Instructions  Your Care Instructions    A healthy lifestyle can help you feel good, stay at a healthy weight, and have plenty of energy for both work and play. A healthy lifestyle is something you can share with your whole family. A healthy lifestyle also can lower your risk for serious health problems, such as high blood pressure, heart disease, and diabetes. You can follow a few steps listed below to improve your health and the health of your family. Follow-up care is a key part of your treatment and safety. Be sure to make and go to all appointments, and call your doctor if you are having problems. It's also a good idea to know your test results and keep a list of the medicines you take. How can you care for yourself at home? · Do not eat too much sugar, fat, or fast foods. You can still have dessert and treats now and then. The goal is moderation. · Start small to improve your eating habits. Pay attention to portion sizes, drink less juice and soda pop, and eat more fruits and vegetables. ? Eat a healthy amount of food. A 3-ounce serving of meat, for example, is about the size of a deck of cards. Fill the rest of your plate with vegetables and whole grains. ? Limit the amount of soda and sports drinks you have every day. Drink more water when you are thirsty. ? Eat at least 5 servings of fruits and vegetables every day. It may seem like a lot, but it is not hard to reach this goal. A serving or helping is 1 piece of fruit, 1 cup of vegetables, or 2 cups of leafy, raw vegetables. Have an apple or some carrot sticks as an afternoon snack instead of a candy bar. Try to have fruits and/or vegetables at every meal.  · Make exercise part of your daily routine. You may want to start with simple activities, such as walking, bicycling, or slow swimming. Try to be active 30 to 60 minutes every day. You do not need to do all 30 to 60 minutes all at once.  For example, you can exercise 3 times a day for 10 or 20 minutes. Moderate exercise is safe for most people, but it is always a good idea to talk to your doctor before starting an exercise program.  · Keep moving. [place] the lawn, work in the garden, or [employer]. Take the stairs instead of the elevator at work. · If you smoke, quit. People who smoke have an increased risk for heart attack, stroke, cancer, and other lung illnesses. Quitting is hard, but there are ways to boost your chance of quitting tobacco for good. ? Use nicotine gum, patches, or lozenges. ? Ask your doctor about stop-smoking programs and medicines. ? Keep trying. In addition to reducing your risk of diseases in the future, you will notice some benefits soon after you stop using tobacco. If you have shortness of breath or asthma symptoms, they will likely get better within a few weeks after you quit. · Limit how much alcohol you drink. Moderate amounts of alcohol (up to 2 drinks a day for men, 1 drink a day for women) are okay. But drinking too much can lead to liver problems, high blood pressure, and other health problems. Family health  If you have a family, there are many things you can do together to improve your health. · Eat meals together as a family as often as possible. · Eat healthy foods. This includes fruits, vegetables, lean meats and dairy, and whole grains. · Include your family in your fitness plan. Most people think of activities such as jogging or tennis as the way to fitness, but there are many ways you and your family can be more active. Anything that makes you breathe hard and gets your heart pumping is exercise. Here are some tips:  ? Walk to do errands or to take your child to school or the bus.  ? Go for a family bike ride after dinner instead of watching TV. Where can you learn more? Go to http://navi-zofia.info/. Enter U671 in the search box to learn more about \"A Healthy Lifestyle: Care Instructions. \"  Current as of: December 7, 2017  Content Version: 11.8  © 8241-8911 Healthwise, Incorporated. Care instructions adapted under license by [employer] (which disclaims liability or warranty for this information). If you have questions about a medical condition or this instruction, always ask your healthcare professional. [address] any warranty or liability for your use of this information.

## 2018-12-13 NOTE — PROGRESS NOTES
Chief Complaint   Patient presents with    Complete Physical    Anxiety    Other     Breast Exam    Sinus Pain     Patient is not fasting. Patient in room # 6.       1. Have you been to the ER, urgent care clinic since your last visit? Hospitalized since your last visit? No    2. Have you seen or consulted any other health care providers outside of the [facility] since your last visit? Include any pap smears or colon screening. No    HM Reviewed.  Flu on hold, patient declined, now feeling well

## 2018-12-14 ENCOUNTER — TELEPHONE (OUTPATIENT)
Dept: FAMILY MEDICINE CLINIC | Age: 38
End: 2018-12-14

## 2018-12-14 RX ORDER — FLUCONAZOLE 150 MG/1
150 TABLET ORAL DAILY
Qty: 1 TAB | Refills: 0 | Status: SHIPPED | OUTPATIENT
Start: 2018-12-14 | End: 2018-12-15

## 2018-12-14 RX ORDER — AMOXICILLIN AND CLAVULANATE POTASSIUM 875; 125 MG/1; MG/1
1 TABLET, FILM COATED ORAL 2 TIMES DAILY
Qty: 20 TAB | Refills: 0 | Status: SHIPPED | OUTPATIENT
Start: 2018-12-14 | End: 2018-12-24

## 2018-12-14 NOTE — TELEPHONE ENCOUNTER
Spoke to patient who reports her ear and sinus pain has gotten worse today. We had discussed starting an antibiotic if this was the case. Will send Augmentin to her pharmacy. Diflucan to use if needed for secondary yeast infection. Patient also mentions that she was talking to her  about starting Lexapro, and he reminded her that she had a weird adverse reaction to this several years ago, in which it caused her pupils to become very dilated and her vision got blurry. She has decided that she'll return to [facility] for further guidance about anxiety meds.

## 2018-12-14 NOTE — TELEPHONE ENCOUNTER
Pt states that she is going to need something called in to the pharmacy. [phone]. She states that she needs to talk to you about fa discussion that you had with her yesterday about medication. Please advise.

## 2018-12-19 ENCOUNTER — TELEPHONE (OUTPATIENT)
Dept: FAMILY MEDICINE CLINIC | Age: 38
End: 2018-12-19

## 2018-12-19 DIAGNOSIS — J01.90 ACUTE BACTERIAL SINUSITIS: Primary | ICD-10-CM

## 2018-12-19 DIAGNOSIS — B96.89 ACUTE BACTERIAL SINUSITIS: Primary | ICD-10-CM

## 2018-12-19 RX ORDER — AZITHROMYCIN 250 MG/1
TABLET, FILM COATED ORAL
Qty: 6 TAB | Refills: 0 | Status: SHIPPED | OUTPATIENT
Start: 2018-12-19 | End: 2018-12-24

## 2018-12-19 NOTE — TELEPHONE ENCOUNTER
Called home number. Verified name and . Spoke with patient. Patient notified of z pack sent to pharmacy. Patient understood the reason for call and had no further questions or concerns.

## 2018-12-19 NOTE — TELEPHONE ENCOUNTER
Pt called and stated the augmentin isn't working and would like the zpap instead . Please call pt at your earliest convenience.

## 2018-12-20 ENCOUNTER — HOSPITAL ENCOUNTER (OUTPATIENT)
Dept: INFUSION THERAPY | Age: 38
End: 2018-12-20

## 2019-01-10 ENCOUNTER — HOSPITAL ENCOUNTER (OUTPATIENT)
Dept: CT IMAGING | Age: 39
Discharge: HOME | End: 2019-01-10
Attending: OTOLARYNGOLOGY
Payer: COMMERCIAL

## 2019-01-10 DIAGNOSIS — J32.9 CHRONIC SINUSITIS: ICD-10-CM

## 2019-01-10 DIAGNOSIS — J34.2 DEVIATED NASAL SEPTUM: ICD-10-CM

## 2019-01-10 DIAGNOSIS — J30.9 ALLERGIC RHINITIS: ICD-10-CM

## 2019-01-10 PROCEDURE — 70486 CT MAXILLOFACIAL W/O DYE: CPT

## 2019-01-17 ENCOUNTER — HOSPITAL ENCOUNTER (OUTPATIENT)
Dept: INFUSION THERAPY | Age: 39
Discharge: HOME | End: 2019-01-17
Payer: COMMERCIAL

## 2019-01-17 ENCOUNTER — HOSPITAL ENCOUNTER (OUTPATIENT)
Dept: ONCOLOGY | Age: 39
Discharge: HOME | End: 2019-01-17

## 2019-01-17 ENCOUNTER — CLINICAL SUPPORT (OUTPATIENT)
Dept: ONCOLOGY | Age: 39
End: 2019-01-17

## 2019-01-17 VITALS
SYSTOLIC BLOOD PRESSURE: 136 MMHG | OXYGEN SATURATION: 98 % | HEART RATE: 94 BPM | TEMPERATURE: 96.6 F | RESPIRATION RATE: 18 BRPM | DIASTOLIC BLOOD PRESSURE: 84 MMHG

## 2019-01-17 DIAGNOSIS — D75.1 POLYCYTHEMIA, SECONDARY: ICD-10-CM

## 2019-01-17 DIAGNOSIS — D75.1 POLYCYTHEMIA, SECONDARY: Primary | ICD-10-CM

## 2019-01-17 PROCEDURE — 36415 COLL VENOUS BLD VENIPUNCTURE: CPT

## 2019-01-17 NOTE — PROGRESS NOTES
[facility] Progress Note    Date: 2019    Name: Isis Ge    MRN: [id number]         : 1980      Ms. Tamiko Abdullahi was assessed and education was provided. Ms. Mitzi Gould vitals were reviewed and patient was observed for 5 minutes prior to treatment. Visit Vitals  /84 (BP 1 Location: Right arm, BP Patient Position: Sitting)   Pulse 94   Temp 96.6 °F (35.9 °C)   Resp 18   SpO2 98%   Breastfeeding? No     CBC obtained from right AC x 1 attempt. Gauze and band aid applied to site. Lab results were obtained and reviewed. No results found for this or any previous visit (from the past 12 hour(s)). Therapeutic phlebotomy not done for HCT 44.7. Patient armband removed and shredded. Ms. Tamiko Abdullahi was discharged from [place] [address] in stable condition at 1530. She is to return on 19 at 1500 for her next appointment for CBC/Phlebotomy Q 6 wks.     Salma Howe  2019  3:35 PM

## 2019-01-18 LAB
BASO+EOS+MONOS # BLD AUTO: 0.7 K/UL (ref 0.2–1.2)
BASO+EOS+MONOS NFR BLD AUTO: 12 % (ref 3.2–16.9)
DIFFERENTIAL METHOD BLD: NORMAL
ERYTHROCYTE [DISTWIDTH] IN BLOOD BY AUTOMATED COUNT: 12.2 % (ref 11.5–14.5)
HCT VFR BLD AUTO: 44.7 %
HGB BLD-MCNC: 15.2 G/DL (ref 12–16)
LYMPHOCYTES # BLD: 1.9 K/UL
LYMPHOCYTES NFR BLD: 30 % (ref 12–49)
MCH RBC QN AUTO: 32.1 PG (ref 25–35)
MCHC RBC AUTO-ENTMCNC: 34 G/DL (ref 31–37)
MCV RBC AUTO: 94.3 FL (ref 78–102)
NEUTS SEG # BLD: 3.6 K/UL
NEUTS SEG NFR BLD: 58 % (ref 42–75)
PLATELET # BLD AUTO: 189 K/UL
RBC # BLD AUTO: 4.74 M/UL
WBC # BLD AUTO: 6.2 K/UL

## 2019-02-28 ENCOUNTER — CLINICAL SUPPORT (OUTPATIENT)
Dept: ONCOLOGY | Age: 39
End: 2019-02-28

## 2019-02-28 ENCOUNTER — HOSPITAL ENCOUNTER (OUTPATIENT)
Dept: ONCOLOGY | Age: 39
Discharge: HOME | End: 2019-02-28

## 2019-02-28 ENCOUNTER — HOSPITAL ENCOUNTER (OUTPATIENT)
Dept: INFUSION THERAPY | Age: 39
Discharge: HOME | End: 2019-02-28
Payer: COMMERCIAL

## 2019-02-28 VITALS
TEMPERATURE: 98.4 F | RESPIRATION RATE: 16 BRPM | HEART RATE: 87 BPM | SYSTOLIC BLOOD PRESSURE: 120 MMHG | OXYGEN SATURATION: 100 % | DIASTOLIC BLOOD PRESSURE: 72 MMHG

## 2019-02-28 DIAGNOSIS — D75.1 POLYCYTHEMIA, SECONDARY: ICD-10-CM

## 2019-02-28 DIAGNOSIS — D75.1 POLYCYTHEMIA, SECONDARY: Primary | ICD-10-CM

## 2019-02-28 LAB
BASO+EOS+MONOS # BLD AUTO: 0.9 K/UL (ref 0–2.3)
BASO+EOS+MONOS NFR BLD AUTO: 13 % (ref 0.1–17)
DIFFERENTIAL METHOD BLD: ABNORMAL
ERYTHROCYTE [DISTWIDTH] IN BLOOD BY AUTOMATED COUNT: 12.1 % (ref 11.5–14.5)
HCT VFR BLD AUTO: 47.6 % (ref 36–48)
HGB BLD-MCNC: 16.1 G/DL (ref 12–16)
LYMPHOCYTES # BLD: 2.3 K/UL (ref 1.1–5.9)
LYMPHOCYTES NFR BLD: 33 % (ref 14–44)
MCH RBC QN AUTO: 32.9 PG (ref 25–35)
MCHC RBC AUTO-ENTMCNC: 33.8 G/DL (ref 31–37)
MCV RBC AUTO: 97.1 FL (ref 78–102)
NEUTS SEG # BLD: 3.7 K/UL (ref 1.8–9.5)
NEUTS SEG NFR BLD: 54 % (ref 40–70)
PLATELET # BLD AUTO: 198 K/UL (ref 140–440)
RBC # BLD AUTO: 4.9 M/UL (ref 4.1–5.1)
WBC # BLD AUTO: 6.9 K/UL (ref 4.5–13)

## 2019-02-28 PROCEDURE — 74011250636 HC RX REV CODE- 250/636: Performed by: INTERNAL MEDICINE

## 2019-02-28 PROCEDURE — 36415 COLL VENOUS BLD VENIPUNCTURE: CPT

## 2019-02-28 PROCEDURE — 99195 PHLEBOTOMY: CPT

## 2019-02-28 RX ORDER — SODIUM CHLORIDE 9 MG/ML
500 INJECTION, SOLUTION INTRAVENOUS ONCE
Status: COMPLETED | OUTPATIENT
Start: 2019-02-28 | End: 2019-02-28

## 2019-02-28 RX ADMIN — SODIUM CHLORIDE 500 ML: 9 INJECTION, SOLUTION INTRAVENOUS at 16:15

## 2019-02-28 NOTE — PROGRESS NOTES
[facility] Progress Note    Date: 2019    Name: Felisha Taylor    MRN: [id number]         : 1980      Ms. Kang Jaeger arrived in the [facility] today at 12, in stable condition, here for CBC/Phlebotomy. She was assessed and education was provided. Ms. Leyva Feeling vitals were reviewed. Visit Vitals  /77 (BP 1 Location: Left arm, BP Patient Position: Sitting)   Pulse 94   Temp 97 °F (36.1 °C)   Resp 16   SpO2 100%   Breastfeeding? No         CBC was drawn from her left AC at 1520, without incident. Lab results were obtained and reviewed. Recent Results (from the past 12 hour(s))   CBC WITH 3 PART DIFF    Collection Time: 19  3:20 PM   Result Value Ref Range    WBC 6.9 4.5 - 13.0 K/uL    RBC 4.90 4. 10 - 5.10 M/uL    HGB 16.1 (HH) 12.0 - 16.0 g/dL    HCT 47.6 36 - 48 %    MCV 97.1 78 - 102 FL    MCH 32.9 25.0 - 35.0 PG    MCHC 33.8 31 - 37 g/dL    RDW 12.1 11.5 - 14.5 %    PLATELET 887 754 - 028 K/uL    NEUTROPHILS 54 40 - 70 %    MIXED CELLS 13 0.1 - 17 %    LYMPHOCYTES 33 14 - 44 %    ABS. NEUTROPHILS 3.7 1.8 - 9.5 K/UL    ABS. MIXED CELLS 0.9 0.0 - 2.3 K/uL    ABS. LYMPHOCYTES 2.3 1.1 - 5.9 K/UL    DF AUTOMATED         Her critically high HGB of 16.1, was reported to Valeriy Garzon NP, at 1530, No new orders were received. Phlebotomy was indicated today, per order, for HCT > 45.0.. PIV was established in her left AC at 1545 (# 20 G) without incident, and phlebotomy was started. Snack was offered and provided. Phlebotomy was completed without incident at 1615, after having obtained 500 ml blood, per order.  ml IV Bolus, was administered post phlebotomy per order, and also without incident. After the completion of the IV NS Bolus, the PIV was removed and gauze/bandaid & Coban was applied. Ms. Kang Jaeger tolerated well, and had no complaints. Ms. Kang Jaeger was discharged from [place] [address] in stable condition at [phone]. Laurita Kim  She is to return on next Friday, 3-8-19, at 1600,  for her next appointment, for CBC/Phlebotomy (Weekly Status).     Angelina Alvares RN  February 28, 2019  4:03 PM

## 2019-03-07 ENCOUNTER — APPOINTMENT (OUTPATIENT)
Dept: INFUSION THERAPY | Age: 39
End: 2019-03-07
Payer: COMMERCIAL

## 2019-03-08 ENCOUNTER — HOSPITAL ENCOUNTER (OUTPATIENT)
Dept: ONCOLOGY | Age: 39
Discharge: HOME | End: 2019-03-08

## 2019-03-08 ENCOUNTER — HOSPITAL ENCOUNTER (OUTPATIENT)
Dept: INFUSION THERAPY | Age: 39
Discharge: HOME | End: 2019-03-08
Payer: COMMERCIAL

## 2019-03-08 ENCOUNTER — CLINICAL SUPPORT (OUTPATIENT)
Dept: ONCOLOGY | Age: 39
End: 2019-03-08

## 2019-03-08 VITALS
SYSTOLIC BLOOD PRESSURE: 134 MMHG | HEART RATE: 87 BPM | RESPIRATION RATE: 16 BRPM | OXYGEN SATURATION: 99 % | TEMPERATURE: 97.6 F | DIASTOLIC BLOOD PRESSURE: 88 MMHG

## 2019-03-08 DIAGNOSIS — D75.1 SECONDARY POLYCYTHEMIA: Primary | ICD-10-CM

## 2019-03-08 DIAGNOSIS — D75.1 SECONDARY POLYCYTHEMIA: ICD-10-CM

## 2019-03-08 LAB
BASO+EOS+MONOS # BLD AUTO: 0.5 K/UL (ref 0–2.3)
BASO+EOS+MONOS NFR BLD AUTO: 9 % (ref 0.1–17)
DIFFERENTIAL METHOD BLD: NORMAL
ERYTHROCYTE [DISTWIDTH] IN BLOOD BY AUTOMATED COUNT: 11.9 % (ref 11.5–14.5)
HCT VFR BLD AUTO: 40.7 % (ref 36–48)
HGB BLD-MCNC: 13.9 G/DL (ref 12–16)
LYMPHOCYTES # BLD: 1.7 K/UL (ref 1.1–5.9)
LYMPHOCYTES NFR BLD: 33 % (ref 14–44)
MCH RBC QN AUTO: 33.2 PG (ref 25–35)
MCHC RBC AUTO-ENTMCNC: 34.2 G/DL (ref 31–37)
MCV RBC AUTO: 97.1 FL (ref 78–102)
NEUTS SEG # BLD: 2.9 K/UL (ref 1.8–9.5)
NEUTS SEG NFR BLD: 57 % (ref 40–70)
PLATELET # BLD AUTO: 227 K/UL (ref 140–440)
RBC # BLD AUTO: 4.19 M/UL (ref 4.1–5.1)
WBC # BLD AUTO: 5.1 K/UL (ref 4.5–13)

## 2019-03-08 PROCEDURE — 36415 COLL VENOUS BLD VENIPUNCTURE: CPT

## 2019-03-08 NOTE — PROGRESS NOTES
[facility] Progress Note    Date: 2019    Name: Anitha Colvin    MRN: [id number]         : 1980      Ms. Marylene Finer arrived in the [facility] today at 1550, in stable condition, here for CBC/Phlebotomy (Weekly Status). She was assessed and education was provided. Ms. Higuera Cons vitals were reviewed. Visit Vitals  /88 (BP 1 Location: Left arm, BP Patient Position: Sitting)   Pulse 87   Temp 97.6 °F (36.4 °C)   Resp 16   SpO2 99%   Breastfeeding? No         CBC was drawn from her left AC at 1558, without incident. Lab results were obtained and reviewed. Recent Results (from the past 12 hour(s))   CBC WITH 3 PART DIFF    Collection Time: 19  3:58 PM   Result Value Ref Range    WBC 5.1 4.5 - 13.0 K/uL    RBC 4.19 4. 10 - 5.10 M/uL    HGB 13.9 12.0 - 16.0 g/dL    HCT 40.7 36 - 48 %    MCV 97.1 78 - 102 FL    MCH 33.2 25.0 - 35.0 PG    MCHC 34.2 31 - 37 g/dL    RDW 11.9 11.5 - 14.5 %    PLATELET 571 688 - 050 K/uL    NEUTROPHILS 57 40 - 70 %    MIXED CELLS 9 0.1 - 17 %    LYMPHOCYTES 33 14 - 44 %    ABS. NEUTROPHILS 2.9 1.8 - 9.5 K/UL    ABS. MIXED CELLS 0.5 0.0 - 2.3 K/uL    ABS. LYMPHOCYTES 1.7 1.1 - 5.9 K/UL    DF AUTOMATED             Phlebotomy was HELD today, per order, for HCT < 42.0           Ms. Cortes tolerated well, and had no complaints. Ms. Marylene Finer was discharged from [place] [address] in stable condition at 80. . She is to return in 1 month, on Thursday, 19,  at 1500,  for her next appointment for CBC/Phlebotomy (monthly status).     Melissa Perkins RN  2019  4:03 PM

## 2019-04-10 NOTE — PROGRESS NOTES
[facility] Progress Note    Date: April 10, 2019    Name: Mayte Jama    MRN: [id number]         : 1980      Ms. Fuad Porter was called and made aware, that her OPIC appointment scheduled for Thursday, 19 at 1500, had to be cancelled and rescheduled to Thursday, 19 at 1500, and she agreed to the appointment change.        Simon Davis RN  April 10, 2019  4:59 PM

## 2019-04-11 ENCOUNTER — HOSPITAL ENCOUNTER (OUTPATIENT)
Dept: INFUSION THERAPY | Age: 39
End: 2019-04-11
Payer: COMMERCIAL

## 2019-04-25 ENCOUNTER — HOSPITAL ENCOUNTER (OUTPATIENT)
Dept: INFUSION THERAPY | Age: 39
Discharge: HOME | End: 2019-04-25
Payer: COMMERCIAL

## 2019-04-25 ENCOUNTER — HOSPITAL ENCOUNTER (OUTPATIENT)
Dept: ONCOLOGY | Age: 39
Discharge: HOME | End: 2019-04-25

## 2019-04-25 ENCOUNTER — CLINICAL SUPPORT (OUTPATIENT)
Dept: ONCOLOGY | Age: 39
End: 2019-04-25

## 2019-04-25 VITALS
TEMPERATURE: 97.6 F | SYSTOLIC BLOOD PRESSURE: 150 MMHG | HEART RATE: 88 BPM | RESPIRATION RATE: 16 BRPM | DIASTOLIC BLOOD PRESSURE: 85 MMHG | OXYGEN SATURATION: 99 %

## 2019-04-25 DIAGNOSIS — D75.1 POLYCYTHEMIA, SECONDARY: Primary | ICD-10-CM

## 2019-04-25 DIAGNOSIS — D75.1 POLYCYTHEMIA, SECONDARY: ICD-10-CM

## 2019-04-25 LAB
BASO+EOS+MONOS # BLD AUTO: 0.9 K/UL (ref 0–2.3)
BASO+EOS+MONOS NFR BLD AUTO: 13 % (ref 0.1–17)
DIFFERENTIAL METHOD BLD: NORMAL
ERYTHROCYTE [DISTWIDTH] IN BLOOD BY AUTOMATED COUNT: 12.1 % (ref 11.5–14.5)
HCT VFR BLD AUTO: 42.2 % (ref 36–48)
HGB BLD-MCNC: 14.1 G/DL (ref 12–16)
LYMPHOCYTES # BLD: 1.9 K/UL (ref 1.1–5.9)
LYMPHOCYTES NFR BLD: 28 % (ref 14–44)
MCH RBC QN AUTO: 31.7 PG (ref 25–35)
MCHC RBC AUTO-ENTMCNC: 33.4 G/DL (ref 31–37)
MCV RBC AUTO: 94.8 FL (ref 78–102)
NEUTS SEG # BLD: 3.8 K/UL (ref 1.8–9.5)
NEUTS SEG NFR BLD: 59 % (ref 40–70)
PLATELET # BLD AUTO: 220 K/UL (ref 140–440)
RBC # BLD AUTO: 4.45 M/UL (ref 4.1–5.1)
WBC # BLD AUTO: 6.6 K/UL (ref 4.5–13)

## 2019-04-25 PROCEDURE — 36415 COLL VENOUS BLD VENIPUNCTURE: CPT

## 2019-04-25 NOTE — PROGRESS NOTES
[facility] Progress Note    Date: 2019    Name: Lazarus Aviles    MRN: [id number]         : 1980      Ms. Audrey Taylor arrived in the [facility] today at [phone] 16, in stable condition, here for CBC/Phlebotomy (Q 6 Week Status). She was assessed and education was provided. Ms. Nancy Singh vitals were reviewed. Visit Vitals  /85 (BP 1 Location: Right arm, BP Patient Position: Sitting)   Pulse 88   Temp 97.6 °F (36.4 °C)   Resp 16   SpO2 99%   Breastfeeding? No         CBC was drawn from her left AC at 1548, without incident. Lab results were obtained and reviewed. Recent Results (from the past 12 hour(s))   CBC WITH 3 PART DIFF    Collection Time: 19  3:48 PM   Result Value Ref Range    WBC 6.6 4.5 - 13.0 K/uL    RBC 4.45 4.10 - 5.10 M/uL    HGB 14.1 12.0 - 16.0 g/dL    HCT 42.2 36 - 48 %    MCV 94.8 78 - 102 FL    MCH 31.7 25.0 - 35.0 PG    MCHC 33.4 31 - 37 g/dL    RDW 12.1 11.5 - 14.5 %    PLATELET 921 598 - 566 K/uL    NEUTROPHILS 59 40 - 70 %    MIXED CELLS 13 0.1 - 17 %    LYMPHOCYTES 28 14 - 44 %    ABS. NEUTROPHILS 3.8 1.8 - 9.5 K/UL    ABS. MIXED CELLS 0.9 0.0 - 2.3 K/uL    ABS. LYMPHOCYTES 1.9 1.1 - 5.9 K/UL    DF AUTOMATED             Phlebotomy was HELD today, per order, for HCT < 45.0. Ms. Audrey Taylor tolerated well, and had no complaints. Ms. Audrey Taylor was discharged from [place] [address] in stable condition at 1600. Nahum White She is to return in 6 weeks, on Thursday, 19,  at 1500,  for her next appointment for CBC/Phlebotomy (Q 6 Week Status).     Sheri Real RN  2019  4:03 PM

## 2019-06-06 ENCOUNTER — HOSPITAL ENCOUNTER (OUTPATIENT)
Dept: INFUSION THERAPY | Age: 39
Discharge: HOME | End: 2019-06-06
Payer: COMMERCIAL

## 2019-06-06 ENCOUNTER — CLINICAL SUPPORT (OUTPATIENT)
Dept: ONCOLOGY | Age: 39
End: 2019-06-06

## 2019-06-06 ENCOUNTER — HOSPITAL ENCOUNTER (OUTPATIENT)
Dept: ONCOLOGY | Age: 39
Discharge: HOME | End: 2019-06-06

## 2019-06-06 VITALS
TEMPERATURE: 97.9 F | DIASTOLIC BLOOD PRESSURE: 83 MMHG | RESPIRATION RATE: 16 BRPM | SYSTOLIC BLOOD PRESSURE: 129 MMHG | OXYGEN SATURATION: 97 % | HEART RATE: 94 BPM

## 2019-06-06 DIAGNOSIS — D75.1 POLYCYTHEMIA, SECONDARY: ICD-10-CM

## 2019-06-06 DIAGNOSIS — D75.1 POLYCYTHEMIA, SECONDARY: Primary | ICD-10-CM

## 2019-06-06 LAB
BASO+EOS+MONOS # BLD AUTO: 0.6 K/UL (ref 0–2.3)
BASO+EOS+MONOS NFR BLD AUTO: 11 % (ref 0.1–17)
DIFFERENTIAL METHOD BLD: NORMAL
ERYTHROCYTE [DISTWIDTH] IN BLOOD BY AUTOMATED COUNT: 12.4 % (ref 11.5–14.5)
HCT VFR BLD AUTO: 42.7 % (ref 36–48)
HGB BLD-MCNC: 14.5 G/DL (ref 12–16)
LYMPHOCYTES # BLD: 1.4 K/UL (ref 1.1–5.9)
LYMPHOCYTES NFR BLD: 23 % (ref 14–44)
MCH RBC QN AUTO: 32.3 PG (ref 25–35)
MCHC RBC AUTO-ENTMCNC: 34 G/DL (ref 31–37)
MCV RBC AUTO: 95.1 FL (ref 78–102)
NEUTS SEG # BLD: 4 K/UL (ref 1.8–9.5)
NEUTS SEG NFR BLD: 67 % (ref 40–70)
PLATELET # BLD AUTO: 179 K/UL (ref 140–440)
RBC # BLD AUTO: 4.49 M/UL (ref 4.1–5.1)
WBC # BLD AUTO: 6 K/UL (ref 4.5–13)

## 2019-06-06 PROCEDURE — 36415 COLL VENOUS BLD VENIPUNCTURE: CPT

## 2019-06-06 RX ORDER — BUPROPION HYDROCHLORIDE 100 MG/1
100 TABLET ORAL 2 TIMES DAILY
COMMUNITY
End: 2020-01-16 | Stop reason: ALTCHOICE

## 2019-06-06 NOTE — PROGRESS NOTES
[facility] Progress Note    Date: 2019    Name: Tigist Jorge    MRN: [id number]         : 1980      Ms. Claudetta Adler arrived in the [facility] today at [phone], in stable condition, here for CBC/Phlebotomy (Q 6 Week Status). She was assessed and education was provided. Ms. Berta Mackey vitals were reviewed. Visit Vitals  /83 (BP 1 Location: Right arm, BP Patient Position: Sitting)   Pulse 94   Temp 97.9 °F (36.6 °C)   Resp 16   SpO2 97%   Breastfeeding? No         CBC was drawn from her right AC at 1533, without incident. Lab results were obtained and reviewed. Recent Results (from the past 12 hour(s))   CBC WITH 3 PART DIFF    Collection Time: 19  3:33 PM   Result Value Ref Range    WBC 6.0 4.5 - 13.0 K/uL    RBC 4.49 4. 10 - 5.10 M/uL    HGB 14.5 12.0 - 16.0 g/dL    HCT 42.7 36 - 48 %    MCV 95.1 78 - 102 FL    MCH 32.3 25.0 - 35.0 PG    MCHC 34.0 31 - 37 g/dL    RDW 12.4 11.5 - 14.5 %    PLATELET 762 671 - 526 K/uL    NEUTROPHILS 67 40 - 70 %    MIXED CELLS 11 0.1 - 17 %    LYMPHOCYTES 23 14 - 44 %    ABS. NEUTROPHILS 4.0 1.8 - 9.5 K/UL    ABS. MIXED CELLS 0.6 0.0 - 2.3 K/uL    ABS. LYMPHOCYTES 1.4 1.1 - 5.9 K/UL    DF AUTOMATED             Phlebotomy was HELD today, per order, for HCT < 45.0. Ms. Claudetta Adler tolerated well, and had no complaints. Ms. Claudetta Adler was discharged from [place] [address] in stable condition at [phone]. Shoaib Rein She is to return in 6 weeks, on Thursday, 19,  at 1500,  for her next appointment for CBC/Phlebotomy (Q 6 Week Status).     Lili Bae RN  2019  4:03 PM

## 2019-06-13 ENCOUNTER — OFFICE VISIT (OUTPATIENT)
Dept: FAMILY MEDICINE CLINIC | Age: 39
End: 2019-06-13

## 2019-06-13 VITALS
RESPIRATION RATE: 14 BRPM | HEART RATE: 94 BPM | OXYGEN SATURATION: 97 % | HEIGHT: 68 IN | WEIGHT: 183 LBS | BODY MASS INDEX: 27.74 KG/M2 | TEMPERATURE: 98 F | DIASTOLIC BLOOD PRESSURE: 78 MMHG | SYSTOLIC BLOOD PRESSURE: 102 MMHG

## 2019-06-13 DIAGNOSIS — F41.9 ANXIETY: Primary | ICD-10-CM

## 2019-06-13 DIAGNOSIS — J32.9 CHRONIC SINUSITIS, UNSPECIFIED LOCATION: ICD-10-CM

## 2019-06-13 DIAGNOSIS — R23.3 EASY BRUISING: ICD-10-CM

## 2019-06-13 DIAGNOSIS — D75.1 SECONDARY POLYCYTHEMIA: ICD-10-CM

## 2019-06-13 RX ORDER — ALBUTEROL SULFATE 90 UG/1
2 AEROSOL, METERED RESPIRATORY (INHALATION)
Qty: 1 INHALER | Refills: 0 | Status: SHIPPED | OUTPATIENT
Start: 2019-06-13 | End: 2019-09-12 | Stop reason: SDUPTHER

## 2019-06-13 RX ORDER — MONTELUKAST SODIUM 10 MG/1
1 TABLET ORAL DAILY
Refills: 5 | COMMUNITY
Start: 2019-05-10 | End: 2019-07-26

## 2019-06-13 RX ORDER — AZELASTINE 1 MG/ML
1 SPRAY, METERED NASAL DAILY
Refills: 2 | COMMUNITY
Start: 2019-05-31 | End: 2019-07-26

## 2019-06-13 NOTE — PROGRESS NOTES
Patient: Damaris Valentin MRN: [id number]  SSN: [id number]    YOB: 1980  Age: 37 y.o. Sex: female      Date of Service: 6/13/2019   Provider: MARKUS Saravia   Office Location:   [address] [place] [address] Dr Emmanuel, [address]  Office Phone: [phone]  Office Fax: [phone]      REASON FOR VISIT:   Chief Complaint   Patient presents with    Bleeding/Bruising     recent unexplained easy bruising     Anxiety        VITALS:   Visit Vitals  /78 (BP 1 Location: Left arm, BP Patient Position: Sitting)   Pulse 94   Temp 98 °F (36.7 °C) (Oral)   Resp 14   Ht 5' 8\" (1.727 m)   Wt 183 lb (83 kg)   LMP 10/29/2014   SpO2 97%   BMI 27.83 kg/m²        MEDICATIONS:   Current Outpatient Medications on File Prior to Visit   Medication Sig Dispense Refill    azelastine (ASTELIN) 137 mcg (0.1 %) nasal spray 1 [place] by Both Nostrils route daily. 2    montelukast (SINGULAIR) 10 mg tablet Take 1 Tab by mouth daily. 5    buPROPion (WELLBUTRIN) 100 mg tablet Take 100 mg by mouth two (2) times a day.  OTHER Weekly Allergy Injections, which started In January 2019      ALPRAZolam Eliana Broaden) 0.5 mg tablet Take 1/2 to 1 tab daily as needed for anxiety MDD: 1 tab 30 Tab 0    naproxen (NAPROSYN) 500 mg tablet Take 1 Tab by mouth every twelve (12) hours as needed. 180 Tab 0    dicyclomine (BENTYL) 20 mg tablet Take 20 mg by mouth every four to six (4-6) hours as needed. No current facility-administered medications on file prior to visit.          ALLERGIES:   Allergies   Allergen Reactions    Lexapro [Escitalopram Oxalate] Other (comments)     dilated pupils and blurry vision        MEDICAL/SURGICAL HISTORY:  Past Medical History:   Diagnosis Date    Discoid lupus     skin lupus    Hypertension     during pregnancy/6 months after    IBS (irritable bowel syndrome) 2016    Menorrhagia     hysterectomy    Polycythemia       Past Surgical History: Procedure Laterality Date    DELIVERY       HX DILATION AND CURETTAGE      HX GYN      laproscopy    HX HYSTERECTOMY          FAMILY HISTORY:  Family History   Problem Relation Age of Onset    Alcohol abuse Mother     Drug Abuse Mother    [facility] Arthritis-rheumatoid Mother     Hypertension Father     Hypertension Sister     High Cholesterol Sister     Dementia Sister     Stroke Sister     Arthritis-rheumatoid Maternal Grandmother     Alcohol abuse Maternal Grandfather     Arthritis-rheumatoid Maternal Grandfather     Other Maternal Grandfather         ruptured bowel    Stroke Sister         SOCIAL HISTORY:  Social History     Tobacco Use    Smoking status: Current Every Day Smoker     Packs/day: 0.50     Years: 15.00     Pack years: 7.50     Types: Cigarettes    Smokeless tobacco: Never Used    Tobacco comment:    Substance Use Topics    Alcohol use: Yes     Alcohol/week: 0.0 oz     Types: 1 - 4 Glasses of wine per week     Comment: occ    Drug use: No           HISTORY OF PRESENT ILLNESS: Richard Reveles is a 37 y.o. female who presents to the office for a routine follow up visit. Anxiety -  Doing well overall. Patient reports she did not get much relief from the Lexapro that was prescribed at her last visit. She subsequently ended up establishing with a psychiatrist, and has been trying various medications, unfortunately experiencing adverse side effects of each. She recently remembered that she was on Wellbutrin at one point for smoking cessation, and that while it did not help much with her nicotine cravings, she did tolerate it without adverse effects. She mentioned this to her psychiatrist, and recently started Wellbutrin 100 mg BID. So far, she is doing well. Patient reports she has come to terms with the fact that her anxiety is largely situational due to a variety of stressors in her life.  (caring for her children, planning a major career change, going back to school for [employer]) Overall, she is doing well, though. Continues to use Xanax very sparingly and only as needed. Does not need a refill today. Chronic Sinusitis -   Following with Dr. Hailey Soria. Doing well on allergy shots. Polycythemia -   Thought to be secondary to tobacco abuse. Followed regularly by heme/onc for CBC checks and phlebotomy as needed. However, today patient complains that for the past few months she has noticed easy bruising. She points out several bruises on her body to me today, mostly on her legs. With the larger bruises, she can recall bumping into something, but some of the smaller ones seem to have appeared without trauma. She also mentions that the bruises seem slow to heal. She has not noticed any bleeding. REVIEW OF SYSTEMS:  Review of Systems   Constitutional: Negative for chills, fever and malaise/fatigue. Respiratory: Negative for shortness of breath. Cardiovascular: Negative for chest pain. Gastrointestinal: Negative for abdominal pain, blood in stool, nausea and vomiting. Endo/Heme/Allergies: Bruises/bleeds easily. Psychiatric/Behavioral: Negative for depression. The patient is nervous/anxious. PHYSICAL EXAMINATION:  Physical Exam   Constitutional: She is oriented to person, place, and time and well-developed, well-nourished, and in no distress. Cardiovascular: Normal rate, regular rhythm and normal heart sounds. Exam reveals no gallop and no friction rub. No murmur heard. Pulmonary/Chest: Effort normal and breath sounds normal. She has no wheezes. She has no rales. Neurological: She is alert and oriented to person, place, and time. Gait normal.   Skin: Skin is warm and dry. No rash noted. several small bruises to arms and legs b/l in various stages of healing   Psychiatric: Mood, memory and affect normal.        RESULTS:  No results found for this visit on 06/13/19. ASSESSMENT/PLAN:  Diagnoses and all orders for this visit:    1.  Anxiety  - Doing well, now followed by psychiatry. Keep f/u as scheduled     2. Chronic sinusitis, unspecified location  - Doing well, now followed by ENT  - Continue allergy shots   - Albuterol refilled for allergic asthma  Orders:    -     albuterol (PROVENTIL HFA, VENTOLIN HFA, PROAIR HFA) 90 mcg/actuation inhaler; Take 2 Puffs by inhalation every four (4) hours as needed for Wheezing. 3. Secondary polycythemia  4. Easy bruising  - Will check some additional labs as below (recent CBC was WNL)   - Strongly encouraged patient to discuss these symptoms with her hematologist  Orders:    -     PTT; Future  -     PROTHROMBIN TIME + INR; Future  -     VON WILLEBRAND PANEL; Future  -     PLATELET COUNT; Future  -     METABOLIC PANEL, COMPREHENSIVE; Future              All questions answered. Patient expresses understanding and agrees with the plan as detailed above.     PATIENT CARE TEAM:   Patient Care Team:  MARKUS Leahy as PCP - General Charly Nolasco MD (Obstetrics & Gynecology)  Willard Dominguez MD (Orthopedic Surgery)  Ame Sarabia MD (Surgical Oncology)  Anthony Pringle MD as Physician (Dermatology)       MARKUS Singletary   June 14, 2019   7:56 AM

## 2019-06-13 NOTE — PROGRESS NOTES
1. Have you been to the ER, urgent care clinic since your last visit? Hospitalized since your last visit? No    2. Have you seen or consulted any other health care providers outside of the [address] [place] [address] since your last visit? Include any pap smears or colon screening.  Yes Where: Dr. Celso Parkinson

## 2019-06-14 ENCOUNTER — HOSPITAL ENCOUNTER (OUTPATIENT)
Dept: LAB | Age: 39
Discharge: HOME | End: 2019-06-14
Payer: COMMERCIAL

## 2019-06-14 DIAGNOSIS — R23.3 EASY BRUISING: ICD-10-CM

## 2019-06-14 LAB
ALBUMIN SERPL-MCNC: 3.8 G/DL (ref 3.4–5)
ALBUMIN/GLOB SERPL: 1.2 {RATIO} (ref 0.8–1.7)
ALP SERPL-CCNC: 102 U/L (ref 45–117)
ALT SERPL-CCNC: 30 U/L (ref 13–56)
ANION GAP SERPL CALC-SCNC: 5 MMOL/L (ref 3–18)
APTT PPP: 25 SEC (ref 23–36.4)
AST SERPL-CCNC: 21 U/L (ref 15–37)
BILIRUB SERPL-MCNC: 0.3 MG/DL (ref 0.2–1)
BUN SERPL-MCNC: 12 MG/DL (ref 7–18)
BUN/CREAT SERPL: 12 (ref 12–20)
CALCIUM SERPL-MCNC: 9.4 MG/DL (ref 8.5–10.1)
CHLORIDE SERPL-SCNC: 104 MMOL/L (ref 100–108)
CO2 SERPL-SCNC: 30 MMOL/L (ref 21–32)
CREAT SERPL-MCNC: 0.98 MG/DL (ref 0.6–1.3)
GLOBULIN SER CALC-MCNC: 3.1 G/DL (ref 2–4)
GLUCOSE SERPL-MCNC: 89 MG/DL (ref 74–99)
INR PPP: 1 (ref 0.8–1.2)
PLATELET # BLD AUTO: 192 K/UL (ref 135–420)
POTASSIUM SERPL-SCNC: 4.5 MMOL/L (ref 3.5–5.5)
PROT SERPL-MCNC: 6.9 G/DL (ref 6.4–8.2)
PROTHROMBIN TIME: 12.5 SEC (ref 11.5–15.2)
SODIUM SERPL-SCNC: 139 MMOL/L (ref 136–145)

## 2019-06-14 PROCEDURE — 85240 CLOT FACTOR VIII AHG 1 STAGE: CPT

## 2019-06-14 PROCEDURE — 80053 COMPREHEN METABOLIC PANEL: CPT

## 2019-06-14 PROCEDURE — 36415 COLL VENOUS BLD VENIPUNCTURE: CPT

## 2019-06-14 PROCEDURE — 85730 THROMBOPLASTIN TIME PARTIAL: CPT

## 2019-06-14 PROCEDURE — 85610 PROTHROMBIN TIME: CPT

## 2019-06-14 PROCEDURE — 85049 AUTOMATED PLATELET COUNT: CPT

## 2019-06-17 LAB
FACT VIII ACT/NOR PPP: 135 % (ref 56–140)
INTERPRETATION, 910378, CSIR1: NORMAL
VWF AG ACT/NOR PPP IA: 184 % (ref 50–200)
VWF:RCO ACT/NOR PPP PL AGG: 172 % (ref 50–200)

## 2019-06-17 NOTE — PROGRESS NOTES
Patient identifiers verified. Patient advised that recent labs were all within normal limits (platelet count, bleeding times, von willebrand factor activity). Jesus Nunez does not have a good explanation for her easy bruising at this time but it is recommended that she brings this up with her hematologist. She voices understanding.

## 2019-06-17 NOTE — PROGRESS NOTES
recent labs were all within normal limits (platelet count, bleeding times, von willebrand factor activity)  I do not have a good explanation for her easy bruising at this time but I would recommend that she brings this up with her hematologist.

## 2019-06-21 NOTE — PROGRESS NOTES
[address] [facility] Progress Note    Date: 2019    Name: Vahe Shannon    MRN: [id number]         : 1980      Ms. Wesley Irving was called today and made aware, that her [facility] appointment that was scheduled for Thursday, 19, has been changed to Thursday, 19 at 1500, and she agreed to the appointment change.       Mirna Lopez RN  2019  5:07 PM

## 2019-07-18 ENCOUNTER — APPOINTMENT (OUTPATIENT)
Dept: INFUSION THERAPY | Age: 39
End: 2019-07-18

## 2019-07-23 ENCOUNTER — OFFICE VISIT (OUTPATIENT)
Dept: ONCOLOGY | Age: 39
End: 2019-07-23

## 2019-07-23 ENCOUNTER — HOSPITAL ENCOUNTER (OUTPATIENT)
Dept: ONCOLOGY | Age: 39
Discharge: HOME | End: 2019-07-23

## 2019-07-23 ENCOUNTER — DOCUMENTATION ONLY (OUTPATIENT)
Dept: ONCOLOGY | Age: 39
End: 2019-07-23

## 2019-07-23 VITALS
WEIGHT: 179 LBS | TEMPERATURE: 98 F | SYSTOLIC BLOOD PRESSURE: 133 MMHG | HEART RATE: 89 BPM | OXYGEN SATURATION: 99 % | BODY MASS INDEX: 27.13 KG/M2 | HEIGHT: 68 IN | RESPIRATION RATE: 16 BRPM | DIASTOLIC BLOOD PRESSURE: 79 MMHG

## 2019-07-23 DIAGNOSIS — R58 ECCHYMOSIS: ICD-10-CM

## 2019-07-23 DIAGNOSIS — F41.9 ANXIETY: ICD-10-CM

## 2019-07-23 DIAGNOSIS — E55.9 VITAMIN D DEFICIENCY: ICD-10-CM

## 2019-07-23 DIAGNOSIS — D75.1 POLYCYTHEMIA: Primary | ICD-10-CM

## 2019-07-23 DIAGNOSIS — D75.1 POLYCYTHEMIA: ICD-10-CM

## 2019-07-23 LAB
BASO+EOS+MONOS # BLD AUTO: 0.6 K/UL (ref 0–2.3)
BASO+EOS+MONOS NFR BLD AUTO: 8 % (ref 0.1–17)
DIFFERENTIAL METHOD BLD: ABNORMAL
ERYTHROCYTE [DISTWIDTH] IN BLOOD BY AUTOMATED COUNT: 13 % (ref 11.5–14.5)
HCT VFR BLD AUTO: 44.5 % (ref 36–48)
HGB BLD-MCNC: 14.8 G/DL (ref 12–16)
LYMPHOCYTES # BLD: 1.3 K/UL (ref 1.1–5.9)
LYMPHOCYTES NFR BLD: 17 % (ref 14–44)
MCH RBC QN AUTO: 31.9 PG (ref 25–35)
MCHC RBC AUTO-ENTMCNC: 33.3 G/DL (ref 31–37)
MCV RBC AUTO: 95.9 FL (ref 78–102)
NEUTS SEG # BLD: 5.5 K/UL (ref 1.8–9.5)
NEUTS SEG NFR BLD: 75 % (ref 40–70)
PLATELET # BLD AUTO: 185 K/UL (ref 140–440)
RBC # BLD AUTO: 4.64 M/UL (ref 4.1–5.1)
WBC # BLD AUTO: 7.4 K/UL (ref 4.5–13)

## 2019-07-23 NOTE — PROGRESS NOTES
Patient has appointment at [facility] to get blood drawn per Dr. Coralie Castleman on 8/13/19 at  8am. Patient is aware of this appointment.

## 2019-07-23 NOTE — PROGRESS NOTES
Hematology/Oncology  Progress Note    Name: Margaret Servin  Date :   : 1980    PCP: Marko Rizvi DO     Ms. Esteban Houser is a 37year old female who was seen for management of her secondary polycythemia. Current therapy: Therapeutic phlebotomy whenever the hematocrit is in excess of 45%. Subjective: The patient is a 47-year-old woman who had developed secondary polycythemia from tobacco use. She reports that she has recently increased cigarette smoking due to personal stressors, however, she plans to decrease consumption. She declines the aid of smoking cessation agents. She require therapeutic phlebotomy on 2019. Today she complains of unexplained bruising with little to no trauma x 3 months. Denies epistaxis, mucosal bleeding, bloody stools, and blood in urine. She denies any anxiety symptoms and reports a decreased use of Xanax. Past medical history, family history, and social history: these were reviewed and remains unchanged.     Past Medical History:   Diagnosis Date    Discoid lupus     skin lupus    Hypertension     during pregnancy/6 months after    IBS (irritable bowel syndrome) 2016    Menorrhagia     hysterectomy    Polycythemia      Past Surgical History:   Procedure Laterality Date    DELIVERY       HX DILATION AND CURETTAGE      HX GYN      laproscopy    HX HYSTERECTOMY       Social History     Socioeconomic History    Marital status:      Spouse name: Not on file    Number of children: Not on file    Years of education: Not on file    Highest education level: Not on file   Occupational History    Occupation:    Social Needs    Financial resource strain: Not on file    Food insecurity:     Worry: Not on file     Inability: Not on file    Transportation needs:     Medical: Not on file     Non-medical: Not on file   Tobacco Use    Smoking status: Current Every Day Smoker     Packs/day: 0.50     Years: 15.00     Pack years: 7.50     Types: Cigarettes    Smokeless tobacco: Never Used    Tobacco comment: 1998   Substance and Sexual Activity    Alcohol use: Yes     Alcohol/week: 0.0 standard drinks     Types: 1 - 4 Glasses of wine per week     Comment: occ    Drug use: No    Sexual activity: Yes     Partners: Male     Birth control/protection: None   Lifestyle    Physical activity:     Days per week: Not on file     Minutes per session: Not on file    Stress: Not on file   Relationships    Social connections:     Talks on phone: Not on file     Gets together: Not on file     Attends [employer] service: Not on file     Active member of club or organization: Not on file     Attends meetings of clubs or organizations: Not on file     Relationship status: Not on file    Intimate partner violence:     Fear of current or ex partner: Not on file     Emotionally abused: Not on file     Physically abused: Not on file     Forced sexual activity: Not on file   Other Topics Concern    Not on file   Social History Narrative    Not on file     Family History   Problem Relation Age of Onset    Alcohol abuse Mother     Drug Abuse Mother     Arthritis-rheumatoid Mother     Hypertension Father     Hypertension Sister     High Cholesterol Sister     Dementia Sister     Stroke Sister     Arthritis-rheumatoid Maternal Grandmother     Alcohol abuse Maternal Grandfather     Arthritis-rheumatoid Maternal Grandfather     Other Maternal Grandfather         ruptured bowel    Stroke Sister      Current Outpatient Medications   Medication Sig Dispense Refill    azelastine (ASTELIN) 137 mcg (0.1 %) nasal spray 1 [place] by Both Nostrils route daily. 2    montelukast (SINGULAIR) 10 mg tablet Take 1 Tab by mouth daily. 5    albuterol (PROVENTIL HFA, VENTOLIN HFA, PROAIR HFA) 90 mcg/actuation inhaler Take 2 Puffs by inhalation every four (4) hours as needed for Wheezing.  1 Inhaler 0    buPROPion (WELLBUTRIN) 100 mg tablet Take 100 mg by mouth two (2) times a day.  OTHER Weekly Allergy Injections, which started In January 2019      ALPRAZolam Gayleen Tika) 0.5 mg tablet Take 1/2 to 1 tab daily as needed for anxiety MDD: 1 tab 30 Tab 0    naproxen (NAPROSYN) 500 mg tablet Take 1 Tab by mouth every twelve (12) hours as needed. 180 Tab 0    dicyclomine (BENTYL) 20 mg tablet Take 20 mg by mouth every four to six (4-6) hours as needed. Review of Systems  Constitutional: The patient has no acute distress or discomfort. HEENT: The patient denies recent head trauma, eye pain, blurred vision,  hearing deficit, oropharyngeal mucosal pain or lesions, and the patient denies throat pain or discomfort. Lymphatics: The patient denies palpable peripheral lymphadenopathy. Hematologic: Unexplained bruising. Denies bleeding, or progressive fatigue. Respiratory: Patient denies having shortness of breath, cough, sputum production, fever, or dyspnea on exertion. Cardiovascular: The patient denies having leg pain, leg swelling, heart palpitations, chest permit, chest pain, or lightheadedness. The patient denies having dyspnea on exertion. Gastrointestinal: The patient denies having nausea, emesis, or diarrhea. The patient denies having any hematemesis or blood in the stool. Genitourinary: Patient denies having urinary urgency, frequency, or dysuria. The patient denies having blood in the urine. Psychological: The patient denies having symptoms of nervousness, anxiety, depression, or thoughts of harming himself some of this. Skin: Patient denies having skin rashes, skin, ulcerations, or unexplained itching or pruritus. Musculoskeletal: The patient denies having pain in the joints, tenderness, or bones.       Objective:     Visit Vitals  Blood Pressure 133/79   Pulse 89   Temperature 98 °F (36.7 °C) (Oral)   Respiration 16   Height 5' 8\" (1.727 m)   Weight 81.2 kg (179 lb)   Last Menstrual Period 10/29/2014   Oxygen Saturation 99%   Body Mass Index 27.22 kg/m²     ECOG 0, pain 0/10  Physical Exam:   Gen. Appearance: The patient is in no acute distress. Skin: Ecchymosis right hip No rash. HEENT: The exam is unremarkable. Neck: Supple without lymphadenopathy or thyromegaly. Lungs: Clear to auscultation and percussion; there are no wheezes or rhonchi. Heart: Regular rate and rhythm; there are no murmurs, gallops, or rubs. Abdomen: Bowel sounds are present and normal.  There is no guarding, tenderness, or hepatosplenomegaly. Extremities: There is no clubbing, cyanosis, or edema. Neurologic: There are no focal neurologic deficits. Lymphatics: There is no palpable peripheral lymphadenopathy. Musculoskeletal: The patient has full range of motion at all joints. There is no evidence of joint deformity or effusions. There is no focal joint tenderness. Psychological/psychiatric: There is no clinical evidence of anxiety, depression, or melancholy. Lab data:      Results for orders placed or performed during the hospital encounter of 07/23/19   CBC WITH 3 PART DIFF     Status: Abnormal   Result Value Ref Range Status    WBC 7.4 4.5 - 13.0 K/uL Final    RBC 4.64 4.10 - 5.10 M/uL Final    HGB 14.8 12.0 - 16.0 g/dL Final    HCT 44.5 36 - 48 % Final    MCV 95.9 78 - 102 FL Final    MCH 31.9 25.0 - 35.0 PG Final    MCHC 33.3 31 - 37 g/dL Final    RDW 13.0 11.5 - 14.5 % Final    PLATELET 136 675 - 917 K/uL Final    NEUTROPHILS 75 (H) 40 - 70 % Final    MIXED CELLS 8 0.1 - 17 % Final    LYMPHOCYTES 17 14 - 44 % Final    ABS. NEUTROPHILS 5.5 1.8 - 9.5 K/UL Final    ABS. MIXED CELLS 0.6 0.0 - 2.3 K/uL Final    ABS. LYMPHOCYTES 1.3 1.1 - 5.9 K/UL Final     Comment: Test performed at [address] [place] [address] or Outpatient Infusion Center Location. Reviewed by Medical Director. DF AUTOMATED   Final         Assessment:     1. Polycythemia    2. Ecchymosis    3. Anxiety    4.  Vitamin D deficiency      Plan:   Polycythemia: I have explained to the patient that the CBC from today shows a WBC count of 7.4, hemoglobin is 14.8g/dL, and the hematocrit is 44.5%. She did receive therapeutic phlebotomy on 2/28/2019. She states that she continues to decrease her tobacco use. I have explained to the patient that as long as her hematocrit stays below 45%, she will not need to undergo therapeutic phlebotomy. The comprehensive metabolic panel, iron profile, ferritin level will be obtained at this time. Ecchymosis - Patient reports unexplained bruising and petechia on various parts of her body with no trauma involved x 3 months. Started taking Wellbutrin a few months ago, uses Aleve for headaches. Ecchymosis may be medication related. Denies epistaxis, mucosal bleeding, bloody stools, and blood in urine. Ordered coagulation factors and platelet aggregation essay today. Anxiety: The patient will continue Xanax as needed. The patient reports decreased use of this medication. Vitamin D Deficiency: the patient has a history of vitamin D deficiency. I will repeat a vitamin D level today. She will be started on oral weekly therapy if indicated. I will have her return to the clinic again in 3 weeks to discuss lab results. indicated.     Orders Placed This Encounter    COMPLETE CBC & AUTO DIFF WBC    InHouse CBC ([employer])     Standing Status:   Future     Number of Occurrences:   1     Standing Expiration Date:   7/30/2019    FACTOR XIII     Standing Status:   Future     Number of Occurrences:   1     Standing Expiration Date:   7/23/2020    FACTOR V LEIDEN     Standing Status:   Future     Number of Occurrences:   1     Standing Expiration Date:   7/23/2020    FACTOR IX ACTIVITY     Standing Status:   Future     Number of Occurrences:   1     Standing Expiration Date:   7/23/2020    FACTOR XI ACTIVITY     Standing Status:   Future     Number of Occurrences:   1     Standing Expiration Date:   7/23/2020    FACTOR VIII ACTIVITY     Standing Status: Future     Number of Occurrences:   1     Standing Expiration Date:   7/23/2020    FACTOR VII ACTIVITY     Standing Status:   Future     Number of Occurrences:   1     Standing Expiration Date:   7/23/2020       Darshan Rivas NP  7/23/2019      I have assessed the patient independently and  agree with the full assessment as outlined.   Mitzi King MD, Viv Arredondo

## 2019-07-23 NOTE — PROGRESS NOTES
[facility] Progress Note    Date: 2019    Name: Alana Myers    MRN: [id number]         : 1980      Ms. Mitzi Coon had an office visit today, Tuesday, 19 with Dr. Lisseth Rocha, and a CBC was drawn during that visit. The CBC results from today were as follows:      Lab results were obtained and reviewed. Recent Results (from the past 12 hour(s))   CBC WITH 3 PART DIFF    Collection Time: 19 10:40 AM   Result Value Ref Range    WBC 7.4 4.5 - 13.0 K/uL    RBC 4.64 4.10 - 5.10 M/uL    HGB 14.8 12.0 - 16.0 g/dL    HCT 44.5 36 - 48 %    MCV 95.9 78 - 102 FL    MCH 31.9 25.0 - 35.0 PG    MCHC 33.3 31 - 37 g/dL    RDW 13.0 11.5 - 14.5 %    PLATELET 568 759 - 596 K/uL    NEUTROPHILS 75 (H) 40 - 70 %    MIXED CELLS 8 0.1 - 17 %    LYMPHOCYTES 17 14 - 44 %    ABS. NEUTROPHILS 5.5 1.8 - 9.5 K/UL    ABS. MIXED CELLS 0.6 0.0 - 2.3 K/uL    ABS. LYMPHOCYTES 1.3 1.1 - 5.9 K/UL    DF AUTOMATED       . Based on the above CBC results from today, No Phlebotomy was indicated, for HCT < 45.0, and therefore, Ms. Nellie De Jesus appointment that was scheduled here in the [facility] on Thursday, 19, has been cancelled accordingly.     Also, Ms. Mitzi Coon was made aware, that her next [facility] appointment is scheduled in 6 weeks, on Friday, 19 at 1500, at the [facility]. (Ms. Mitzi Coon requested the location change to the [facility], because that location is closer to her new job.)            Tawanda Cazares RN  2019  5:34 PM

## 2019-07-25 ENCOUNTER — HOSPITAL ENCOUNTER (OUTPATIENT)
Dept: INFUSION THERAPY | Age: 39
Discharge: HOME | End: 2019-07-25

## 2019-07-26 ENCOUNTER — HOSPITAL ENCOUNTER (EMERGENCY)
Age: 39
Discharge: HOME | End: 2019-07-26
Attending: EMERGENCY MEDICINE
Payer: COMMERCIAL

## 2019-07-26 ENCOUNTER — APPOINTMENT (OUTPATIENT)
Dept: GENERAL RADIOLOGY | Age: 39
End: 2019-07-26
Attending: EMERGENCY MEDICINE
Payer: COMMERCIAL

## 2019-07-26 VITALS
TEMPERATURE: 97.7 F | HEIGHT: 68 IN | DIASTOLIC BLOOD PRESSURE: 74 MMHG | WEIGHT: 180 LBS | HEART RATE: 79 BPM | RESPIRATION RATE: 15 BRPM | OXYGEN SATURATION: 100 % | BODY MASS INDEX: 27.28 KG/M2 | SYSTOLIC BLOOD PRESSURE: 123 MMHG

## 2019-07-26 DIAGNOSIS — F17.200 NICOTINE USE DISORDER: ICD-10-CM

## 2019-07-26 DIAGNOSIS — R07.89 MUSCULOSKELETAL CHEST PAIN: Primary | ICD-10-CM

## 2019-07-26 DIAGNOSIS — F43.0 STRESS REACTION: ICD-10-CM

## 2019-07-26 LAB
ALBUMIN SERPL-MCNC: 3.7 G/DL (ref 3.4–5)
ALBUMIN/GLOB SERPL: 1.1 {RATIO} (ref 0.8–1.7)
ALP SERPL-CCNC: 94 U/L (ref 45–117)
ALT SERPL-CCNC: 25 U/L (ref 13–56)
ANION GAP SERPL CALC-SCNC: 6 MMOL/L (ref 3–18)
AST SERPL-CCNC: 19 U/L (ref 10–38)
ATRIAL RATE: 92 BPM
BASOPHILS # BLD: 0 K/UL (ref 0–0.1)
BASOPHILS NFR BLD: 1 % (ref 0–2)
BILIRUB SERPL-MCNC: 0.5 MG/DL (ref 0.2–1)
BUN SERPL-MCNC: 7 MG/DL (ref 7–18)
BUN/CREAT SERPL: 7 (ref 12–20)
CALCIUM SERPL-MCNC: 8.7 MG/DL (ref 8.5–10.1)
CALCULATED P AXIS, ECG09: 68 DEGREES
CALCULATED R AXIS, ECG10: 84 DEGREES
CALCULATED T AXIS, ECG11: 56 DEGREES
CHLORIDE SERPL-SCNC: 104 MMOL/L (ref 100–111)
CK MB CFR SERPL CALC: NORMAL % (ref 0–4)
CK MB SERPL-MCNC: <1 NG/ML (ref 5–25)
CK SERPL-CCNC: 59 U/L (ref 26–192)
CO2 SERPL-SCNC: 29 MMOL/L (ref 21–32)
CREAT SERPL-MCNC: 0.95 MG/DL (ref 0.6–1.3)
D DIMER PPP FEU-MCNC: 0.29 UG/ML(FEU)
DIAGNOSIS, 93000: NORMAL
DIFFERENTIAL METHOD BLD: ABNORMAL
EOSINOPHIL # BLD: 0.4 K/UL (ref 0–0.4)
EOSINOPHIL NFR BLD: 6 % (ref 0–5)
ERYTHROCYTE [DISTWIDTH] IN BLOOD BY AUTOMATED COUNT: 13.5 % (ref 11.6–14.5)
GLOBULIN SER CALC-MCNC: 3.3 G/DL (ref 2–4)
GLUCOSE SERPL-MCNC: 141 MG/DL (ref 74–99)
HCT VFR BLD AUTO: 43.4 % (ref 35–45)
HGB BLD-MCNC: 14.5 G/DL (ref 12–16)
LYMPHOCYTES # BLD: 1.5 K/UL (ref 0.9–3.6)
LYMPHOCYTES NFR BLD: 23 % (ref 21–52)
MCH RBC QN AUTO: 32.2 PG (ref 24–34)
MCHC RBC AUTO-ENTMCNC: 33.4 G/DL (ref 31–37)
MCV RBC AUTO: 96.4 FL (ref 74–97)
MONOCYTES # BLD: 0.4 K/UL (ref 0.05–1.2)
MONOCYTES NFR BLD: 6 % (ref 3–10)
NEUTS SEG # BLD: 4.1 K/UL (ref 1.8–8)
NEUTS SEG NFR BLD: 64 % (ref 40–73)
P-R INTERVAL, ECG05: 150 MS
PLATELET # BLD AUTO: 178 K/UL (ref 135–420)
PMV BLD AUTO: 10.2 FL (ref 9.2–11.8)
POTASSIUM SERPL-SCNC: 4.1 MMOL/L (ref 3.5–5.5)
PROT SERPL-MCNC: 7 G/DL (ref 6.4–8.2)
Q-T INTERVAL, ECG07: 336 MS
QRS DURATION, ECG06: 78 MS
QTC CALCULATION (BEZET), ECG08: 415 MS
RBC # BLD AUTO: 4.5 M/UL (ref 4.2–5.3)
SODIUM SERPL-SCNC: 139 MMOL/L (ref 136–145)
TROPONIN I SERPL-MCNC: <0.02 NG/ML (ref 0–0.04)
TROPONIN I SERPL-MCNC: <0.02 NG/ML (ref 0–0.04)
VENTRICULAR RATE, ECG03: 92 BPM
WBC # BLD AUTO: 6.4 K/UL (ref 4.6–13.2)

## 2019-07-26 PROCEDURE — 71046 X-RAY EXAM CHEST 2 VIEWS: CPT

## 2019-07-26 PROCEDURE — 96374 THER/PROPH/DIAG INJ IV PUSH: CPT

## 2019-07-26 PROCEDURE — 82550 ASSAY OF CK (CPK): CPT

## 2019-07-26 PROCEDURE — 99285 EMERGENCY DEPT VISIT HI MDM: CPT

## 2019-07-26 PROCEDURE — 85379 FIBRIN DEGRADATION QUANT: CPT

## 2019-07-26 PROCEDURE — 74011250636 HC RX REV CODE- 250/636: Performed by: EMERGENCY MEDICINE

## 2019-07-26 PROCEDURE — 93005 ELECTROCARDIOGRAM TRACING: CPT

## 2019-07-26 PROCEDURE — 80053 COMPREHEN METABOLIC PANEL: CPT

## 2019-07-26 PROCEDURE — 85025 COMPLETE CBC W/AUTO DIFF WBC: CPT

## 2019-07-26 RX ORDER — KETOROLAC TROMETHAMINE 30 MG/ML
30 INJECTION, SOLUTION INTRAMUSCULAR; INTRAVENOUS
Status: COMPLETED | OUTPATIENT
Start: 2019-07-26 | End: 2019-07-26

## 2019-07-26 RX ORDER — KETOROLAC TROMETHAMINE 30 MG/ML
30 INJECTION, SOLUTION INTRAMUSCULAR; INTRAVENOUS
Status: DISCONTINUED | OUTPATIENT
Start: 2019-07-26 | End: 2019-07-26

## 2019-07-26 RX ORDER — NAPROXEN 500 MG/1
500 TABLET ORAL 2 TIMES DAILY WITH MEALS
Qty: 10 TAB | Refills: 0 | Status: SHIPPED | OUTPATIENT
Start: 2019-07-26 | End: 2019-07-31

## 2019-07-26 RX ADMIN — KETOROLAC TROMETHAMINE 30 MG: 30 INJECTION, SOLUTION INTRAMUSCULAR; INTRAVENOUS at 15:58

## 2019-07-26 NOTE — ED TRIAGE NOTES
Sudden onset riight sided chest pain with right arm pain and rapid heart rate with feeling of \"passing out\" while driving to work today.  Sx improved, now pain feels \"muscular\" in nature

## 2019-07-26 NOTE — DISCHARGE INSTRUCTIONS
Learning About Benefits From Quitting Smoking  How does quitting smoking make you healthier? If you're thinking about quitting smoking, you may have a few reasons to be smoke-free. Your health may be one of them. · When you quit smoking, you lower your risks for cancer, lung disease, heart attack, stroke, blood vessel disease, and blindness from macular degeneration. · When you're smoke-free, you get sick less often, and you heal faster. You are less likely to get colds, flu, bronchitis, and pneumonia. · As a nonsmoker, you may find that your mood is better and you are less stressed. When and how will you feel healthier? Quitting has real health benefits that start from day 1 of being smoke-free. And the longer you stay smoke-free, the healthier you get and the better you feel. The first hours  · After just 20 minutes, your blood pressure and heart rate go down. That means there's less stress on your heart and blood vessels. · Within 12 hours, the level of carbon monoxide in your blood drops back to normal. That makes room for more oxygen. With more oxygen in your body, you may notice that you have more energy than when you smoked. After 2 weeks  · Your lungs start to work better. · Your risk of heart attack starts to drop. After 1 month  · When your lungs are clear, you cough less and breathe deeper, so it's easier to be active. · Your sense of taste and smell return. That means you can enjoy food more than you have since you started smoking. Over the years  · After 1 year, your risk of heart disease is half what it would be if you kept smoking. · After 5 years, your risk of stroke starts to shrink. Within a few years after that, it's about the same as if you'd never smoked. · After 10 years, your risk of dying from lung cancer is cut by about half. And your risk for many other types of cancer is lower too. How would quitting help others in your life?   When you quit smoking, you improve the health of everyone who now breathes in your smoke. · Their heart, lung, and cancer risks drop, much like yours. · They are sick less. For babies and small children, living smoke-free means they're less likely to have ear infections, pneumonia, and bronchitis. · If you're a woman who is or will be pregnant someday, quitting smoking means a healthier . · Children who are close to you are less likely to become adult smokers. Where can you learn more? Go to http://navi-zofia.info/. Enter 052 [id number] [id number] in the search box to learn more about \"Learning About Benefits From Quitting Smoking. \"  Current as of: 2018  Content Version: 12.1  © 5686-7053 [employer]. Care instructions adapted under license by [employer] (which disclaims liability or warranty for this information). If you have questions about a medical condition or this instruction, always ask your healthcare professional. [place] [address] any warranty or liability for your use of this information. Stopping Smoking: Care Instructions  Your Care Instructions  Cigarette smokers crave the nicotine in cigarettes. Giving it up is much harder than simply changing a habit. Your body has to stop craving the nicotine. It is hard to quit, but you can do it. There are many tools that people use to quit smoking. You may find that combining tools works best for you. There are several steps to quitting. First you get ready to quit. Then you get support to help you. After that, you learn new skills and behaviors to become a nonsmoker. For many people, a necessary step is getting and using medicine. Your doctor will help you set up the plan that best meets your needs. You may want to attend a smoking cessation program to help you quit smoking. When you choose a program, look for one that has proven success. Ask your doctor for ideas.  You will greatly increase your chances of success if you take medicine as well as get counseling or join a cessation program.  Some of the changes you feel when you first quit tobacco are uncomfortable. Your body will miss the nicotine at first, and you may feel short-tempered and grumpy. You may have trouble sleeping or concentrating. Medicine can help you deal with these symptoms. You may struggle with changing your smoking habits and rituals. The last step is the tricky one: Be prepared for the smoking urge to continue for a time. This is a lot to deal with, but keep at it. You will feel better. Follow-up care is a key part of your treatment and safety. Be sure to make and go to all appointments, and call your doctor if you are having problems. It's also a good idea to know your test results and keep a list of the medicines you take. How can you care for yourself at home? · Ask your family, friends, and coworkers for support. You have a better chance of quitting if you have help and support. · Join a support group, such as Nicotine Anonymous, for people who are trying to quit smoking. · Consider signing up for a smoking cessation program, such as the American [employer] Association's Freedom from Smoking program.  · Get text messaging support. Go to the website at www.smokefree. gov to sign up for the [facility] program.  · Set a quit date. Pick your date carefully so that it is not right in the middle of a big deadline or stressful time. Once you quit, do not even take a puff. Get rid of all ashtrays and lighters after your last cigarette. Clean your house and your clothes so that they do not smell of smoke. · Learn how to be a nonsmoker. Think about ways you can avoid those things that make you reach for a cigarette. ? Avoid situations that put you at greatest risk for smoking. For some people, it is hard to have a drink with friends without smoking. For others, they might skip a coffee break with coworkers who smoke. ? Change your daily routine.  Take a different route to work or eat a meal in a different place. · Cut down on stress. Calm yourself or release tension by doing an activity you enjoy, such as reading a book, taking a hot bath, or gardening. · Talk to your doctor or pharmacist about nicotine replacement therapy, which replaces the nicotine in your body. You still get nicotine but you do not use tobacco. Nicotine replacement products help you slowly reduce the amount of nicotine you need. These products come in several forms, many of them available over-the-counter:  ? Nicotine patches  ? Nicotine gum and lozenges  ? Nicotine inhaler  · Ask your doctor about bupropion (Wellbutrin) or varenicline (Chantix), which are prescription medicines. They do not contain nicotine. They help you by reducing withdrawal symptoms, such as stress and anxiety. · Some people find hypnosis, acupuncture, and massage helpful for ending the smoking habit. · Eat a healthy diet and get regular exercise. Having healthy habits will help your body move past its craving for nicotine. · Be prepared to keep trying. Most people are not successful the first few times they try to quit. Do not get mad at yourself if you smoke again. Make a list of things you learned and think about when you want to try again, such as next week, next month, or next year. Where can you learn more? Go to http://navi-ozfia.info/. Enter U807 in the search box to learn more about \"Stopping Smoking: Care Instructions. \"  Current as of: September 26, 2018  Content Version: 12.1  © 9237-7760 [employer]. Care instructions adapted under license by [employer] (which disclaims liability or warranty for this information). If you have questions about a medical condition or this instruction, always ask your healthcare professional. [place] [address] any warranty or liability for your use of this information.     Patient Education        Musculoskeletal Chest Pain: Care Instructions  Your Care Instructions    Chest pain is not always a sign that something is wrong with your heart or that you have another serious problem. The doctor thinks your chest pain is caused by strained muscles or ligaments, inflamed chest cartilage, or another problem in your chest, rather than by your heart. You may need more tests to find the cause of your chest pain. Follow-up care is a key part of your treatment and safety. Be sure to make and go to all appointments, and call your doctor if you are having problems. It's also a good idea to know your test results and keep a list of the medicines you take. How can you care for yourself at home? · Take pain medicines exactly as directed. ? If the doctor gave you a prescription medicine for pain, take it as prescribed. ? If you are not taking a prescription pain medicine, ask your doctor if you can take an over-the-counter medicine. · Rest and protect the sore area. · Stop, change, or take a break from any activity that may be causing your pain or soreness. · Put ice or a cold pack on the sore area for 10 to 20 minutes at a time. Try to do this every 1 to 2 hours for the next 3 days (when you are awake) or until the swelling goes down. Put a thin cloth between the ice and your skin. · After 2 or 3 days, apply a heating pad set on low or a warm cloth to the area that hurts. Some doctors suggest that you go back and forth between hot and cold. · Do not wrap or tape your ribs for support. This may cause you to take smaller breaths, which could increase your risk of lung problems. · Mentholated creams such as Bengay or Icy Hot may soothe sore muscles. Follow the instructions on the package. · Follow your doctor's instructions for exercising. · Gentle stretching and massage may help you get better faster. Stretch slowly to the point just before pain begins, and hold the stretch for at least 15 to 30 seconds. Do this 3 or 4 times a day. Stretch just after you have applied heat. · As your pain gets better, slowly return to your normal activities. Any increased pain may be a sign that you need to rest a while longer. When should you call for help? Call 911 anytime you think you may need emergency care. For example, call if:    · You have chest pain or pressure. This may occur with:  ? Sweating. ? Shortness of breath. ? Nausea or vomiting. ? Pain that spreads from the chest to the neck, jaw, or one or both shoulders or arms. ? Dizziness or lightheadedness. ? A fast or uneven pulse. After calling 911, chew 1 adult-strength aspirin. Wait for an ambulance. Do not try to drive yourself.     · You have sudden chest pain and shortness of breath, or you cough up blood.    Call your doctor now or seek immediate medical care if:    · You have any trouble breathing.     · Your chest pain gets worse.     · Your chest pain occurs consistently with exercise and is relieved by rest.    Watch closely for changes in your health, and be sure to contact your doctor if:    · Your chest pain does not get better after 1 week. Where can you learn more? Go to http://navi-zofia.info/. Enter V293 in the search box to learn more about \"Musculoskeletal Chest Pain: Care Instructions. \"  Current as of: September 23, 2018  Content Version: 12.1  © 7513-4402 [employer]. Care instructions adapted under license by [employer] (which disclaims liability or warranty for this information). If you have questions about a medical condition or this instruction, always ask your healthcare professional. [place] [address] any warranty or liability for your use of this information. Patient Education        Learning About Stress  What is stress? Stress is what you feel when you have to handle more than you are used to. Stress is a fact of life for most people, and it affects everyone differently.  What causes stress for you may not be stressful for someone else. A lot of things can cause stress. You may feel stress when you go on a job interview, take a test, or run a race. This kind of short-term stress is normal and even useful. It can help you if you need to work hard or react quickly. For example, stress can help you finish an important job on time. Stress also can last a long time. Long-term stress is caused by stressful situations or events. Examples of long-term stress include long-term health problems, ongoing problems at work, or conflicts in your family. Long-term stress can harm your health. How does stress affect your health? When you are stressed, your body responds as though you are in danger. It makes hormones that speed up your heart, make you breathe faster, and give you a burst of energy. This is called the fight-or-flight stress response. If the stress is over quickly, your body goes back to normal and no harm is done. But if stress happens too often or lasts too long, it can have bad effects. Long-term stress can make you more likely to get sick, and it can make symptoms of some diseases worse. If you tense up when you are stressed, you may develop neck, shoulder, or low back pain. Stress is linked to high blood pressure and heart disease. Stress also harms your emotional health. It can make you menjivar, tense, or depressed. Your relationships may suffer, and you may not do well at work or school. What can you do to manage stress? How to relax your mind  · Write. It may help to write about things that are bothering you. This helps you find out how much stress you feel and what is causing it. When you know this, you can find better ways to cope. · Let your feelings out. Talk, laugh, cry, and express anger when you need to. Talking with friends, family, a counselor, or a member of the clergy about your feelings is a healthy way to relieve stress. · Do something you enjoy.  For example, listen to music or go to a movie. Practice your hobby or do volunteer work. · Meditate. This can help you relax, because you are not worrying about what happened before or what may happen in the future. · Do guided imagery. Imagine yourself in any setting that helps you feel calm. You can use audiotapes, books, or a teacher to guide you. How to relax your body  · Do something active. Exercise or activity can help reduce stress. Walking is a great way to get started. Even everyday activities such as housecleaning or yard work can help. · Do breathing exercises. For example:  ? From a standing position, bend forward from the waist with your knees slightly bent. Let your arms dangle close to the floor. ? Breathe in slowly and deeply as you return to a standing position. Roll up slowly and lift your head last.  ? Hold your breath for just a few seconds in the standing position. ? Breathe out slowly and bend forward from the waist.  · Try yoga or kimo chi. These techniques combine exercise and meditation. You may need some training at first to learn them. What can you do to prevent stress? · Manage your time. This helps you find time to do the things you want and need to do. · Get enough sleep. Your body recovers from the stresses of the day while you are sleeping. · Get support. Your family, friends, and community can make a difference in how you experience stress. Where can you learn more? Go to http://navi-zofia.info/. Enter Y152 in the search box to learn more about \"Learning About Stress. \"  Current as of: June 28, 2018  Content Version: 12.1  © 2111-7848 [employer]. Care instructions adapted under license by [employer] (which disclaims liability or warranty for this information).  If you have questions about a medical condition or this instruction, always ask your healthcare professional. [address] any warranty or liability for your use of this information.

## 2019-07-26 NOTE — ED PROVIDER NOTES
EMERGENCY DEPARTMENT HISTORY AND PHYSICAL EXAM    1:58 PM      Date: 2019  Patient Name: Henrry Freitas    History of Presenting Illness     Chief Complaint   Patient presents with    Chest Pain         History Provided By: Patient    Additional History (Context): Henrry Freitas is a 37 y.o. female with Past medical history of polycythemia, lupus who presents with chief complaint of right-sided to central chest pain started about 11:45 AM today when driving to work. States that she felt lightheaded and her heart beating fast.  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 When she was turning her steering well it seemed like the pain was worse with certain movement. No cough, shortness of breath, dizziness, abdominal pain, nausea, leg swelling or leg pain, back pain and no other complaint.   Has history of hysterectomy    PCP: MARKUS Mcdaniel        Past History     Past Medical History:  Past Medical History:   Diagnosis Date    Discoid lupus     skin lupus    Hypertension     during pregnancy/6 months after    IBS (irritable bowel syndrome) 2016    Menorrhagia     hysterectomy    Polycythemia        Past Surgical History:  Past Surgical History:   Procedure Laterality Date    DELIVERY       HX DILATION AND CURETTAGE      HX GYN      laproscopy    HX HYSTERECTOMY         Family History:  Family History   Problem Relation Age of Onset    Alcohol abuse Mother     Drug Abuse Mother    [facility] Arthritis-rheumatoid Mother     Hypertension Father     Hypertension Sister     High Cholesterol Sister     Dementia Sister     Stroke Sister     Arthritis-rheumatoid Maternal Grandmother     Alcohol abuse Maternal Grandfather     Arthritis-rheumatoid Maternal Grandfather     Other Maternal Grandfather         ruptured bowel    Stroke Sister        Social History:  Social History     Tobacco Use    Smoking status: Current Every Day Smoker Packs/day: 0.50     Years: 15.00     Pack years: 7.50     Types: Cigarettes    Smokeless tobacco: Never Used    Tobacco comment: 1998   Substance Use Topics    Alcohol use: Yes     Alcohol/week: 0.0 standard drinks     Types: 1 - 4 Glasses of wine per week     Comment: occ    Drug use: No       Allergies: Allergies   Allergen Reactions    Lexapro [Escitalopram Oxalate] Other (comments)     dilated pupils and blurry vision         Review of Systems       Review of Systems   Constitutional: Negative for chills and fever. HENT: Negative for congestion, rhinorrhea, sore throat and trouble swallowing. Eyes: Negative for visual disturbance. Respiratory: Negative for cough, shortness of breath and wheezing. Cardiovascular: Positive for chest pain. Gastrointestinal: Negative for abdominal pain, nausea and vomiting. Endocrine: Negative for polyuria. Genitourinary: Negative for dysuria. Musculoskeletal: Negative for arthralgias and neck stiffness. Skin: Negative for pallor and rash. Neurological: Positive for light-headedness. Negative for dizziness, weakness, numbness and headaches. Hematological: Does not bruise/bleed easily. Psychiatric/Behavioral: Negative for confusion and dysphoric mood. All other systems reviewed and are negative. Physical Exam     Visit Vitals  /66   Pulse 86   Temp 97.7 °F (36.5 °C)   Resp 16   Ht 5' 8\" (1.727 m)   Wt 81.6 kg (180 lb)   LMP 10/29/2014   SpO2 100%   BMI 27.37 kg/m²         Physical Exam   Constitutional: She is oriented to person, place, and time. She appears well-developed and well-nourished. No distress. HENT:   Head: Normocephalic and atraumatic. Mouth/Throat: Oropharynx is clear and moist.   Eyes: Pupils are equal, round, and reactive to light. Conjunctivae are normal. No scleral icterus. Neck: Normal range of motion. Neck supple. Cardiovascular: Normal rate and intact distal pulses. Exam reveals no gallop and no friction rub. No murmur heard. Capillary refill < 3 seconds   Pulmonary/Chest: Effort normal and breath sounds normal. No respiratory distress. She has no wheezes. Abdominal: Soft. Bowel sounds are normal. She exhibits no distension. There is no tenderness. Musculoskeletal: Normal range of motion. She exhibits no edema or tenderness. No lower extremity edema, no calf tenderness   Lymphadenopathy:     She has no cervical adenopathy. Neurological: She is alert and oriented to person, place, and time. No cranial nerve deficit. Skin: Skin is warm and dry. No rash noted. She is not diaphoretic. Psychiatric: She has a normal mood and affect. Her behavior is normal.   Nursing note and vitals reviewed. Diagnostic Study Results     Labs -  Recent Results (from the past 12 hour(s))   EKG, 12 LEAD, INITIAL    Collection Time: 07/26/19  1:00 PM   Result Value Ref Range    Ventricular Rate 92 BPM    Atrial Rate 92 BPM    P-R Interval 150 ms    QRS Duration 78 ms    Q-T Interval 336 ms    QTC Calculation (Bezet) 415 ms    Calculated P Axis 68 degrees    Calculated R Axis 84 degrees    Calculated T Axis 56 degrees    Diagnosis       Normal sinus rhythm  Normal ECG  When compared with ECG of 10-LIZ-2016 08:05,  No significant change was found  Confirmed by Isabel Guajardo MD, Paulina Wiggins ([id number]) on 7/26/2019 4:38:11 PM     CBC WITH AUTOMATED DIFF    Collection Time: 07/26/19  1:08 PM   Result Value Ref Range    WBC 6.4 4.6 - 13.2 K/uL    RBC 4.50 4. 20 - 5.30 M/uL    HGB 14.5 12.0 - 16.0 g/dL    HCT 43.4 35.0 - 45.0 %    MCV 96.4 74.0 - 97.0 FL    MCH 32.2 24.0 - 34.0 PG    MCHC 33.4 31.0 - 37.0 g/dL    RDW 13.5 11.6 - 14.5 %    PLATELET 972 804 - 489 K/uL    MPV 10.2 9.2 - 11.8 FL    NEUTROPHILS 64 40 - 73 %    LYMPHOCYTES 23 21 - 52 %    MONOCYTES 6 3 - 10 %    EOSINOPHILS 6 (H) 0 - 5 %    BASOPHILS 1 0 - 2 %    ABS. NEUTROPHILS 4.1 1.8 - 8.0 K/UL    ABS. LYMPHOCYTES 1.5 0.9 - 3.6 K/UL    ABS. MONOCYTES 0.4 0.05 - 1.2 K/UL    ABS. EOSINOPHILS 0.4 0.0 - 0.4 K/UL    ABS. BASOPHILS 0.0 0.0 - 0.1 K/UL    DF AUTOMATED     METABOLIC PANEL, COMPREHENSIVE    Collection Time: 07/26/19  1:08 PM   Result Value Ref Range    Sodium 139 136 - 145 mmol/L    Potassium 4.1 3.5 - 5.5 mmol/L    Chloride 104 100 - 111 mmol/L    CO2 29 21 - 32 mmol/L    Anion gap 6 3.0 - 18 mmol/L    Glucose 141 (H) 74 - 99 mg/dL    BUN 7 7.0 - 18 MG/DL    Creatinine 0.95 0.6 - 1.3 MG/DL    BUN/Creatinine ratio 7 (L) 12 - 20      GFR est AA >60 >60 ml/min/1.73m2    GFR est non-AA >60 >60 ml/min/1.73m2    Calcium 8.7 8.5 - 10.1 MG/DL    Bilirubin, total 0.5 0.2 - 1.0 MG/DL    ALT (SGPT) 25 13 - 56 U/L    AST (SGOT) 19 10 - 38 U/L    Alk. phosphatase 94 45 - 117 U/L    Protein, total 7.0 6.4 - 8.2 g/dL    Albumin 3.7 3.4 - 5.0 g/dL    Globulin 3.3 2.0 - 4.0 g/dL    A-G Ratio 1.1 0.8 - 1.7     CARDIAC PANEL,(CK, CKMB & TROPONIN)    Collection Time: 07/26/19  1:08 PM   Result Value Ref Range    CK 59 26 - 192 U/L    CK - MB <1.0 <3.6 ng/ml    CK-MB Index  0.0 - 4.0 %     CALCULATION NOT PERFORMED WHEN RESULT IS BELOW LINEAR LIMIT    Troponin-I, QT <0.02 0.0 - 0.045 NG/ML   D DIMER    Collection Time: 07/26/19  3:48 PM   Result Value Ref Range    D DIMER 0.29 <0.46 ug/ml(FEU)   TROPONIN I    Collection Time: 07/26/19  3:48 PM   Result Value Ref Range    Troponin-I, QT <0.02 0.0 - 0.045 NG/ML       Radiologic Studies -   XR CHEST PA LAT   Final Result   IMPRESSION:      Negative chest.            Medical Decision Making   I am the first provider for this patient. I reviewed the vital signs, available nursing notes, past medical history, past surgical history, family history and social history. Vital Signs-Reviewed the patient's vital signs. Pulse Oximetry Analysis -  100 on room air (Interpretation) normal    Cardiac Monitor:  Rate: 86  Rhythm:  Normal Sinus Rhythm     EKG: Interpreted by the EP Dr. Milad Klein.    Time Interpreted: 1:02 PM   Rate: 92   Rhythm: Normal Sinus Rhythm    Interpretation: Normal QRS duration, normal QTC, no ST elevation, no ST depressions, no T wave inversions       Records Reviewed: Nursing Notes and Old Medical Records (Time of Review: 5:23 PM)    Provider Notes (Medical Decision Making): DDX: Musculoskeletal, cardiac, GERD, costochondritis, anxiety, esophageal spasm    Labs, EKG, chest x-ray, Toradol  MDM    Medications   ketorolac (TORADOL) injection 30 mg (30 mg IntraVENous Given 7/26/19 7592)           ED Course: Progress Notes, Reevaluation, and Consults:  Labs reassuring    Patient feeling better after Toradol    Repeat cardiac enzymes negative, still less than 0.02    The patient was counseled on the dangers of tobacco use, and was advised to quit. Reviewed strategies to maximize success, including written materials. I have reassessed the patient. I have discussed the workup, results and plan with the patient and patient is in agreement. Patient is feeling better. Patient will be prescribed Naprosyn. Patient was discharge in stable condition. Patient was given outpatient follow up. Patient is to return to emergency department if any new or worsening condition. Diagnosis     Clinical Impression:   1. Musculoskeletal chest pain    2. Stress reaction    3.  Nicotine use disorder        Disposition: Discharged    Follow-up Information     Follow up With Specialties Details Why Contact Info    Deon Chand PA Physician Assistant Schedule an appointment as soon as possible for a visit in 3 days  [address]  [address] [address]      [address] [facility] EMERGENCY DEPT Emergency Medicine  As needed, If symptoms worsen [facility] [facility]  [phone]           Patient's Medications   Start Taking    No medications on file   Continue Taking    ALBUTEROL (PROVENTIL HFA, VENTOLIN HFA, PROAIR HFA) 90 MCG/ACTUATION INHALER    Take 2 Puffs by inhalation every four (4) hours as needed for Wheezing. ALPRAZOLAM (XANAX) 0.5 MG TABLET    Take 1/2 to 1 tab daily as needed for anxiety MDD: 1 tab    BUPROPION (WELLBUTRIN) 100 MG TABLET    Take 100 mg by mouth two (2) times a day. OTHER    Weekly Allergy Injections, which started In January 2019   These Medications have changed    No medications on file   Stop Taking    AZELASTINE (ASTELIN) 137 MCG (0.1 %) NASAL SPRAY    1 [place] by Both Nostrils route daily. DICYCLOMINE (BENTYL) 20 MG TABLET    Take 20 mg by mouth every four to six (4-6) hours as needed. MONTELUKAST (SINGULAIR) 10 MG TABLET    Take 1 Tab by mouth daily. NAPROXEN (NAPROSYN) 500 MG TABLET    Take 1 Tab by mouth every twelve (12) hours as needed. Raisa Murillo DO    Dragon medical dictation software was used for portions of this report. Unintended transcription errors may occur. My signature above authenticates this document and my orders, the final    diagnosis (es), discharge prescription (s), and instructions in the Epic    record.

## 2019-07-29 ENCOUNTER — PATIENT OUTREACH (OUTPATIENT)
Dept: OTHER | Age: 39
End: 2019-07-29

## 2019-07-29 NOTE — PROGRESS NOTES
Initial HPRP:   Patient on report as discharged from [facility] ED Visit 7/26/19 for - Musculoskeletal Chest Pain Initial attempt to contact patient for transitions of care.  Left discreet message on voicemail with this Care Coordinator's contact information.  Will attempt outreach on 7/30/19.      Call 911 anytime you think you may need emergency care. For example, call if:   You have chest pain or pressure. This may occur with:  ? Sweating. ? Shortness of breath. ? Nausea or vomiting. ? Pain that spreads from the chest to the neck, jaw, or one or both shoulders or arms. ? Dizziness or lightheadedness. ? A fast or uneven pulse. After calling 911, chew 1 adult-strength aspirin. Wait for an ambulance. Do not try to drive  yourself.  You have sudden chest pain and shortness of breath, or you cough up blood. Call your doctor now or seek immediate medical care if:  You have any trouble breathing.  Your chest pain gets worse.    Your chest pain occurs consistently with exercise and is relieved by rest.   Your chest pain does not get better after 1 week

## 2019-07-30 ENCOUNTER — PATIENT OUTREACH (OUTPATIENT)
Dept: OTHER | Age: 39
End: 2019-07-30

## 2019-07-30 NOTE — LETTER
7/30/2019 11:10 AM 
 
Ms. Jalen Munoz [phone] Dear Jalen Zamora My name is Danitza Cerda, Employee Care Coordinator for [facility] and I have been trying to reach you. The Employee Care Management [facility]) program is a free-of-charge confidential service provided to our employees and their family members covered by the [facility]. The program will provide an employee and his/her family with the [facility] expertise to assist in navigating the health care delivery system, provider services, and their overall care needsso as to assure and improve health care interactions and enhance the quality of life. This program is designed to provide you with the opportunity to have a [facility] care manager partner with you for the following services: 
 
 1) when you come home from the hospital or emergency room 2) when help is needed to manage your disease 3) when you need assistance coordinating services or appointments ECM now partners with [facility]. If you are a qualifying employee, you may receive an additional 10 wellness incentive points for every month of active participation with an Employee Care Manager. [facility] is dedicated to empowering the good health of its community and improving the quality of care and care experiences for employees and their families. We are committed to safeguarding patient confidentiality and privacy, assuring that every employee has the respect he or she deserves in managing their health. The information shared with your care manager will not be shared with anyone else aside from those you identify as part of your care team, and will only be used to assist you with any identified care needs. Please contact me if you would like this service provided to you. Sincerely, 
 
 
Wes Salinas LPN  [facility] Care Coordinator [facility] AT [address], [address] [facility]  [address] C [phone]  F [phone]  [email or web address]. [email or web address]

## 2019-07-30 NOTE — PROGRESS NOTES
Patient identified as eligible for [address] [place] [address] services. Second telephone outreach attempted. Left discreet voicemail with this CM confidential contact information. Will send UTR letter via [employer]/Mail. Shalini Pena       Next Outreach 8/14/19 f/u -  Musculoskeletal Chest Pain

## 2019-08-13 ENCOUNTER — PATIENT OUTREACH (OUTPATIENT)
Dept: OTHER | Age: 39
End: 2019-08-13

## 2019-08-13 NOTE — PROGRESS NOTES
HPRP f/u:  Telephone attempt to contact patient for Health Promotion and Risk Prevention. Left discreet message on voicemail with this CC contact information. Will follow for one month for transitions of care needs. Next outreach is 8/30/19 for discussion f/u - Musculoskeletal Chest Pain and Resolve Episode.

## 2019-08-30 ENCOUNTER — PATIENT OUTREACH (OUTPATIENT)
Dept: OTHER | Age: 39
End: 2019-08-30

## 2019-08-30 NOTE — LETTER
8/30/2019 8:33 AM 
 
Ms. Frank Munoz [phone] Dear Frank Pantoja My name is Edwin Shah,  Employee Care Coordinator for [facility], and I have been trying to reach you. The Employee Care Management [facility]) program is a free-of-charge, confidential service provided to our employees and their family members covered by the [facility]. I can help you with care transitions such as when you come home from the hospital, when help is needed to manage your disease, or when you need assistance coordinating services or appointments. [facility] now partners with [facility]. If you are a qualifying employee, you may receive an additional 10 wellness incentive points for every month of active participation with an Employee Care Manager. As healthcare providers, we know that patients do better when they have close follow up with a primary care provider (PCP). I can help you find one that is convenient to you and covered by your insurance. I can also help you understand any after visit instructions, such as what symptoms to watch out for, or any new or changed medications. We can work together using your preferred communication -- telephone, email, [employer]. If you do not have a [employer] account, I can help you request access. Our program is designed to provide you with the opportunity to have a [facility] care manager partner with you for your healthcare needs. Due to not being able to reach you, I am closing out the current program, but will remain available to you should you have any questions. Please contact me at the below number if I can provide you with assistance for any of the above services. Sincerely, 
 
 
[facility] Care Coordinator [facility] AT [address], [address] [facility]  [address] C [phone]  F [phone]  [email or web address] [facility] ECM [email or web address]. aspx

## 2019-09-06 ENCOUNTER — APPOINTMENT (OUTPATIENT)
Dept: INFUSION THERAPY | Age: 39
End: 2019-09-06
Payer: COMMERCIAL

## 2019-09-10 ENCOUNTER — OFFICE VISIT (OUTPATIENT)
Dept: ONCOLOGY | Age: 39
End: 2019-09-10

## 2019-09-10 ENCOUNTER — HOSPITAL ENCOUNTER (OUTPATIENT)
Dept: LAB | Age: 39
Discharge: HOME | End: 2019-09-10
Payer: COMMERCIAL

## 2019-09-10 VITALS
HEART RATE: 87 BPM | WEIGHT: 182.2 LBS | SYSTOLIC BLOOD PRESSURE: 137 MMHG | DIASTOLIC BLOOD PRESSURE: 80 MMHG | BODY MASS INDEX: 27.61 KG/M2 | HEIGHT: 68 IN | OXYGEN SATURATION: 98 % | TEMPERATURE: 98 F

## 2019-09-10 DIAGNOSIS — R58 ECCHYMOSIS: ICD-10-CM

## 2019-09-10 DIAGNOSIS — D75.1 POLYCYTHEMIA, SECONDARY: ICD-10-CM

## 2019-09-10 DIAGNOSIS — D75.1 POLYCYTHEMIA: ICD-10-CM

## 2019-09-10 DIAGNOSIS — E55.9 VITAMIN D DEFICIENCY: ICD-10-CM

## 2019-09-10 DIAGNOSIS — D75.1 POLYCYTHEMIA: Primary | ICD-10-CM

## 2019-09-10 LAB
ALBUMIN SERPL-MCNC: 4.3 G/DL (ref 3.4–5)
ALBUMIN/GLOB SERPL: 1.5 {RATIO} (ref 0.8–1.7)
ALP SERPL-CCNC: 100 U/L (ref 45–117)
ALT SERPL-CCNC: 23 U/L (ref 13–56)
ANION GAP SERPL CALC-SCNC: 3 MMOL/L (ref 3–18)
AST SERPL-CCNC: 21 U/L (ref 10–38)
BASOPHILS # BLD: 0.1 K/UL (ref 0–0.1)
BASOPHILS NFR BLD: 2 % (ref 0–2)
BILIRUB SERPL-MCNC: 0.4 MG/DL (ref 0.2–1)
BUN SERPL-MCNC: 11 MG/DL (ref 7–18)
BUN/CREAT SERPL: 13 (ref 12–20)
CALCIUM SERPL-MCNC: 9.3 MG/DL (ref 8.5–10.1)
CHLORIDE SERPL-SCNC: 104 MMOL/L (ref 100–111)
CO2 SERPL-SCNC: 30 MMOL/L (ref 21–32)
CREAT SERPL-MCNC: 0.83 MG/DL (ref 0.6–1.3)
DIFFERENTIAL METHOD BLD: ABNORMAL
EOSINOPHIL # BLD: 0.3 K/UL (ref 0–0.4)
EOSINOPHIL NFR BLD: 5 % (ref 0–5)
ERYTHROCYTE [DISTWIDTH] IN BLOOD BY AUTOMATED COUNT: 13.7 % (ref 11.6–14.5)
GLOBULIN SER CALC-MCNC: 2.8 G/DL (ref 2–4)
GLUCOSE SERPL-MCNC: 102 MG/DL (ref 74–99)
HCT VFR BLD AUTO: 44.3 % (ref 35–45)
HGB BLD-MCNC: 14.3 G/DL (ref 12–16)
LYMPHOCYTES # BLD: 1.6 K/UL (ref 0.9–3.6)
LYMPHOCYTES NFR BLD: 30 % (ref 21–52)
MCH RBC QN AUTO: 32.1 PG (ref 24–34)
MCHC RBC AUTO-ENTMCNC: 32.3 G/DL (ref 31–37)
MCV RBC AUTO: 99.6 FL (ref 74–97)
MONOCYTES # BLD: 0.4 K/UL (ref 0.05–1.2)
MONOCYTES NFR BLD: 8 % (ref 3–10)
NEUTS SEG # BLD: 3 K/UL (ref 1.8–8)
NEUTS SEG NFR BLD: 55 % (ref 40–73)
PLATELET # BLD AUTO: 180 K/UL (ref 135–420)
PMV BLD AUTO: 10.1 FL (ref 9.2–11.8)
POTASSIUM SERPL-SCNC: 4.4 MMOL/L (ref 3.5–5.5)
PROT SERPL-MCNC: 7.1 G/DL (ref 6.4–8.2)
RBC # BLD AUTO: 4.45 M/UL (ref 4.2–5.3)
SODIUM SERPL-SCNC: 137 MMOL/L (ref 136–145)
WBC # BLD AUTO: 5.4 K/UL (ref 4.6–13.2)

## 2019-09-10 PROCEDURE — 80053 COMPREHEN METABOLIC PANEL: CPT

## 2019-09-10 PROCEDURE — 83540 ASSAY OF IRON: CPT

## 2019-09-10 PROCEDURE — 82728 ASSAY OF FERRITIN: CPT

## 2019-09-10 PROCEDURE — 85025 COMPLETE CBC W/AUTO DIFF WBC: CPT

## 2019-09-10 PROCEDURE — 36415 COLL VENOUS BLD VENIPUNCTURE: CPT

## 2019-09-10 NOTE — PROGRESS NOTES
Hematology/medical oncology progress note    9/10/2019  Olesya  YOB: 1980    PCP: Dr. Cornelius Foote    Diagnosis: Bruising/ecchymoses    I have explained to Ms. Yogi Talley that her unexplained bruising may be in part iatrogenic due to the use of Aleve and possibility of evolving senile purpura or potential collagen vascular disease. Factor VII activity was normal at 113%, factor VIII activity was normal at 162%, factor IX activity was normal at 123%, and a factor XI activity was 100%. A platelet aggregation assay showed normal aggregation patterns. Therefore based on these assessment her bruising and ecchymoses are most likely related to iatrogenic processes versus evolving underlying vascular abnormalities of unclear etiology. I have recommended that she follow-up with the rheumatology specialist since at one time she was being evaluated for possible systemic lupus. We will place her back in her routine follow-up program here in the hematology clinic. She had her questions answered to her satisfaction. Total time 25 minutes, greater than 50% of the time was in counseling and coordination of care. Antoine Yao MD, [address]

## 2019-09-11 LAB
FERRITIN SERPL-MCNC: 11 NG/ML (ref 8–388)
IRON SATN MFR SERPL: 20 %
IRON SERPL-MCNC: 84 UG/DL (ref 50–175)
TIBC SERPL-MCNC: 417 UG/DL (ref 250–450)

## 2019-09-12 DIAGNOSIS — J32.9 CHRONIC SINUSITIS, UNSPECIFIED LOCATION: ICD-10-CM

## 2019-09-12 RX ORDER — ALBUTEROL SULFATE 90 UG/1
2 AEROSOL, METERED RESPIRATORY (INHALATION)
Qty: 1 INHALER | Refills: 2 | Status: SHIPPED | OUTPATIENT
Start: 2019-09-12 | End: 2019-12-31 | Stop reason: SDUPTHER

## 2019-09-12 NOTE — TELEPHONE ENCOUNTER
This patient contacted office for the following prescriptions to be filled:    Medication requested :   Requested Prescriptions     Pending Prescriptions Disp Refills    albuterol (PROVENTIL HFA, VENTOLIN HFA, PROAIR HFA) 90 mcg/actuation inhaler 1 Inhaler 0     Sig: Take 2 Puffs by inhalation every four (4) hours as needed for Wheezing.      PCP: [place] or Print[facility]   Mail order or Local pharmacy [phone]    Scheduled appointment if not seen by current providers in office: lov 6/13/19 brett 1/16/20

## 2019-10-14 NOTE — TELEPHONE ENCOUNTER
LOV 06/24/2019  F/U 01/16/2019 MARKUS Chand  Patient states pharmacy said they sent a refill request for Singulair to [address] [place] [address] last week. I do not see where MARKUS Chand prescribed this medication for patient. I found medication in discontinued, but patient did confirm she has been taking this medication all along for her allergies. Patient states maybe she got confused and it was her prior provider that gave it to her, but would like to see if she can get a refill.

## 2019-10-15 RX ORDER — MONTELUKAST SODIUM 10 MG/1
10 TABLET ORAL DAILY
Qty: 30 TAB | Refills: 11 | Status: SHIPPED | OUTPATIENT
Start: 2019-10-15 | End: 2021-05-11 | Stop reason: DRUGHIGH

## 2019-10-16 NOTE — TELEPHONE ENCOUNTER
Spoke with Radha at [address] [facility] no prior authorization is needed, Errol Calabrese only needed a refill approval from pcp

## 2019-10-16 NOTE — TELEPHONE ENCOUNTER
Spoke with Kenrick Rehman aware that according to [facility] pharmacy no prior authorization is needed for her singuliar

## 2019-10-22 ENCOUNTER — HOSPITAL ENCOUNTER (OUTPATIENT)
Dept: INFUSION THERAPY | Age: 39
Discharge: HOME | End: 2019-10-22
Payer: COMMERCIAL

## 2019-10-22 VITALS
SYSTOLIC BLOOD PRESSURE: 143 MMHG | DIASTOLIC BLOOD PRESSURE: 87 MMHG | OXYGEN SATURATION: 98 % | HEART RATE: 91 BPM | RESPIRATION RATE: 16 BRPM | TEMPERATURE: 98.4 F

## 2019-10-22 LAB
BASO+EOS+MONOS # BLD AUTO: 0.4 K/UL (ref 0–2.3)
BASO+EOS+MONOS NFR BLD AUTO: 7 % (ref 0.1–17)
DIFFERENTIAL METHOD BLD: NORMAL
ERYTHROCYTE [DISTWIDTH] IN BLOOD BY AUTOMATED COUNT: 12.5 % (ref 11.5–14.5)
HCT VFR BLD AUTO: 44.7 % (ref 36–48)
HGB BLD-MCNC: 15.3 G/DL (ref 12–16)
LYMPHOCYTES # BLD: 1.7 K/UL (ref 1.1–5.9)
LYMPHOCYTES NFR BLD: 30 % (ref 14–44)
MCH RBC QN AUTO: 32.9 PG (ref 25–35)
MCHC RBC AUTO-ENTMCNC: 34.2 G/DL (ref 31–37)
MCV RBC AUTO: 96.1 FL (ref 78–102)
NEUTS SEG # BLD: 3.5 K/UL (ref 1.8–9.5)
NEUTS SEG NFR BLD: 63 % (ref 40–70)
PLATELET # BLD AUTO: 206 K/UL (ref 140–440)
RBC # BLD AUTO: 4.65 M/UL (ref 4.1–5.1)
WBC # BLD AUTO: 5.6 K/UL (ref 4.5–13)

## 2019-10-22 PROCEDURE — 85025 COMPLETE CBC W/AUTO DIFF WBC: CPT

## 2019-10-22 PROCEDURE — 36415 COLL VENOUS BLD VENIPUNCTURE: CPT

## 2019-10-22 NOTE — PROGRESS NOTES
[facility] OPIC Progress Note    Date: 2019    Name: Viky Marion    MRN: [id number]         : 1980      Ms. Jaret Payne arrived in the [facility] today at 1120, in stable condition, here for CBC/Phlebotomy (Q 6 Week Status). She was assessed and education was provided. Ms. Souleymane Bermudez vitals were reviewed. Visit Vitals  /87 (BP 1 Location: Right arm, BP Patient Position: Sitting)   Pulse 91   Temp 98.4 °F (36.9 °C)   Resp 16   SpO2 98%         CBC was drawn from her right hand, without incident. Gauze and bandaid applied to site. Lab results were obtained and reviewed. Recent Results (from the past 12 hour(s))   CBC WITH 3 PART DIFF    Collection Time: 10/22/19 11:40 AM   Result Value Ref Range    WBC 5.6 4.5 - 13.0 K/uL    RBC 4.65 4.10 - 5.10 M/uL    HGB 15.3 12.0 - 16 g/dL    HCT 44.7 36 - 48 %    MCV 96.1 78 - 102 FL    MCH 32.9 25.0 - 35.0 PG    MCHC 34.2 31 - 37 g/dL    RDW 12.5 11.5 - 14.5 %    PLATELET 963 606 - 842 K/uL    NEUTROPHILS 63 40 - 70 %    MIXED CELLS 7 0.1 - 17 %    LYMPHOCYTES 30 14 - 44 %    ABS. NEUTROPHILS 3.5 1.8 - 9.5 K/UL    ABS. MIXED CELLS 0.4 0.0 - 2.3 K/uL    ABS. LYMPHOCYTES 1.7 1.1 - 5.9 K/UL    DF AUTOMATED       Phlebotomy was HELD today, per order, for HCT < 45.0. Ms. Jaret Payne tolerated well, and had no complaints. Ms. Jaret Payne was discharged from [place] [address] in stable condition at 1155. José Miguel Ortiz She is to return in 6 weeks, on 12-3-19, at 80,  for her next appointment for CBC/Phlebotomy (Q 6 Week Status).     Yamile Rueda RN  2019  4:03 PM

## 2019-12-03 ENCOUNTER — APPOINTMENT (OUTPATIENT)
Dept: INFUSION THERAPY | Age: 39
End: 2019-12-03

## 2019-12-31 DIAGNOSIS — J32.9 CHRONIC SINUSITIS, UNSPECIFIED LOCATION: ICD-10-CM

## 2019-12-31 NOTE — TELEPHONE ENCOUNTER
This patient contacted office for the following prescriptions to be filled:    Medication requested :   Requested Prescriptions     Pending Prescriptions Disp Refills    albuterol (PROVENTIL HFA, VENTOLIN HFA, PROAIR HFA) 90 mcg/actuation inhaler 1 Inhaler 2     Sig: Take 2 Puffs by inhalation every four (4) hours as needed for Wheezing.      PCP: [place] or [facility]  Mail order or Local pharmacy [phone]    Scheduled appointment if not seen by current providers in office: lov 6/13/19 brett 1/16/20

## 2020-01-02 RX ORDER — ALBUTEROL SULFATE 90 UG/1
2 AEROSOL, METERED RESPIRATORY (INHALATION)
Qty: 1 INHALER | Refills: 2 | Status: SHIPPED | OUTPATIENT
Start: 2020-01-02 | End: 2020-06-16 | Stop reason: SDUPTHER

## 2020-01-09 ENCOUNTER — HOSPITAL ENCOUNTER (OUTPATIENT)
Dept: ONCOLOGY | Age: 40
Discharge: HOME | End: 2020-01-09

## 2020-01-09 DIAGNOSIS — D75.1 POLYCYTHEMIA, SECONDARY: ICD-10-CM

## 2020-01-10 ENCOUNTER — CLINICAL SUPPORT (OUTPATIENT)
Dept: ONCOLOGY | Age: 40
End: 2020-01-10

## 2020-01-10 ENCOUNTER — HOSPITAL ENCOUNTER (OUTPATIENT)
Dept: ONCOLOGY | Age: 40
Discharge: HOME | End: 2020-01-10

## 2020-01-10 ENCOUNTER — HOSPITAL ENCOUNTER (OUTPATIENT)
Dept: INFUSION THERAPY | Age: 40
Discharge: HOME | End: 2020-01-10
Payer: COMMERCIAL

## 2020-01-10 VITALS
SYSTOLIC BLOOD PRESSURE: 130 MMHG | RESPIRATION RATE: 16 BRPM | OXYGEN SATURATION: 100 % | TEMPERATURE: 97.9 F | DIASTOLIC BLOOD PRESSURE: 82 MMHG | HEART RATE: 87 BPM

## 2020-01-10 DIAGNOSIS — D75.1 POLYCYTHEMIA, SECONDARY: ICD-10-CM

## 2020-01-10 DIAGNOSIS — D75.1 POLYCYTHEMIA, SECONDARY: Primary | ICD-10-CM

## 2020-01-10 LAB
BASO+EOS+MONOS # BLD AUTO: 0.6 K/UL (ref 0–2.3)
BASO+EOS+MONOS NFR BLD AUTO: 13 % (ref 0.1–17)
DIFFERENTIAL METHOD BLD: NORMAL
ERYTHROCYTE [DISTWIDTH] IN BLOOD BY AUTOMATED COUNT: 12.4 % (ref 11.5–14.5)
HCT VFR BLD AUTO: 46.9 % (ref 36–48)
HGB BLD-MCNC: 16 G/DL (ref 12–16)
LYMPHOCYTES # BLD: 1.5 K/UL (ref 1.1–5.9)
LYMPHOCYTES NFR BLD: 33 % (ref 14–44)
MCH RBC QN AUTO: 33.4 PG (ref 25–35)
MCHC RBC AUTO-ENTMCNC: 34.1 G/DL (ref 31–37)
MCV RBC AUTO: 97.9 FL (ref 78–102)
NEUTS SEG # BLD: 2.5 K/UL (ref 1.8–9.5)
NEUTS SEG NFR BLD: 55 % (ref 40–70)
PLATELET # BLD AUTO: 185 K/UL (ref 140–440)
RBC # BLD AUTO: 4.79 M/UL (ref 4.1–5.1)
WBC # BLD AUTO: 4.6 K/UL (ref 4.5–13)

## 2020-01-10 PROCEDURE — 99195 PHLEBOTOMY: CPT

## 2020-01-10 PROCEDURE — 74011250636 HC RX REV CODE- 250/636: Performed by: INTERNAL MEDICINE

## 2020-01-10 PROCEDURE — 36415 COLL VENOUS BLD VENIPUNCTURE: CPT

## 2020-01-10 RX ORDER — SODIUM CHLORIDE 0.9 % (FLUSH) 0.9 %
10-40 SYRINGE (ML) INJECTION AS NEEDED
Status: DISCONTINUED | OUTPATIENT
Start: 2020-01-10 | End: 2020-01-14 | Stop reason: HOSPADM

## 2020-01-10 RX ORDER — SODIUM CHLORIDE 9 MG/ML
500 INJECTION, SOLUTION INTRAVENOUS ONCE
Status: COMPLETED | OUTPATIENT
Start: 2020-01-10 | End: 2020-01-10

## 2020-01-10 RX ADMIN — Medication 20 ML: at 11:45

## 2020-01-10 RX ADMIN — SODIUM CHLORIDE 500 ML: 9 INJECTION, SOLUTION INTRAVENOUS at 12:35

## 2020-01-10 NOTE — PROGRESS NOTES
[facility] OPIC Progress Note    Date: January 10, 2020    Name: Deon Frye    MRN: [id number]         : 1980      THERAPEUTIC PHLEBOTOMY      Ms. Srinath Gallardo arrived in the [facility] today at [phone]. Pt was assessed & education provided. Ms. Gui Avery vitals were reviewed. Visit Vitals  /82 (BP 1 Location: Left arm, BP Patient Position: Sitting)   Pulse 87   Temp 97.9 °F (36.6 °C)   Resp 16   SpO2 100%       Pt was observed for 5 minutes after obtaining vital signs prior to initiating treatment. CBC was drawn via right AC venipuncture x1 attempt. Gauze/ paper tape to site. Lab results were obtained and reviewed. Recent Results (from the past 12 hour(s))   CBC WITH 3 PART DIFF    Collection Time: 01/10/20 11:26 AM   Result Value Ref Range    WBC 4.6 4.5 - 13.0 K/uL    RBC 4.79 4.10 - 5.10 M/uL    HGB 16.0 12.0 - 16.0 g/dL    HCT 46.9 36 - 48 %    MCV 97.9 78 - 102 FL    MCH 33.4 25.0 - 35.0 PG    MCHC 34.1 31 - 37 g/dL    RDW 12.4 11.5 - 14.5 %    PLATELET 158 065 - 382 K/uL    NEUTROPHILS 55 40 - 70 %    MIXED CELLS 13 0.1 - 17 %    LYMPHOCYTES 33 14 - 44 %    ABS. NEUTROPHILS 2.5 1.8 - 9.5 K/UL    ABS. MIXED CELLS 0.6 0.0 - 2.3 K/uL    ABS. LYMPHOCYTES 1.5 1.1 - 5.9 K/UL    DF AUTOMATED         20g IV inserted in pt's left mid-forearm x1 attempt. Positive for blood return/ flushes without difficulty. Attempted to start therapeutic phlebotomy via this IV but blood collection was very sluggish despite flushes w/ NS.    20g IV inserted in pt's left AC x1 attempt. Therapeutic phlebotomy was initiated via this IV at 1154. Ended at 1235 w/ approximately 500ml of blood collected. (Other IV was removed/ intact shortly after this IV was started per pt request. Wrapped site w/ gauze & coban.)    Pt was then hydrated with 500ml NS per order. Ms. Srinath Gallardo tolerated well without complaints. IV removed/ intact. Gauze/ coban to site. Pt armband removed & shredded.     [facility]. Srinath [facility] at 1255. She is to return on 1/24/20 at 1000 for her next appointment.     Ruchi Cadet RN  January 10, 2020

## 2020-01-16 ENCOUNTER — OFFICE VISIT (OUTPATIENT)
Dept: FAMILY MEDICINE CLINIC | Age: 40
End: 2020-01-16

## 2020-01-16 VITALS
HEART RATE: 72 BPM | TEMPERATURE: 98.3 F | RESPIRATION RATE: 16 BRPM | OXYGEN SATURATION: 98 % | DIASTOLIC BLOOD PRESSURE: 72 MMHG | HEIGHT: 68 IN | WEIGHT: 178 LBS | SYSTOLIC BLOOD PRESSURE: 124 MMHG | BODY MASS INDEX: 26.98 KG/M2

## 2020-01-16 DIAGNOSIS — R79.89 ELEVATED SERUM FREE T4 LEVEL: ICD-10-CM

## 2020-01-16 DIAGNOSIS — J32.9 CHRONIC SINUSITIS, UNSPECIFIED LOCATION: ICD-10-CM

## 2020-01-16 DIAGNOSIS — F41.9 ANXIETY: ICD-10-CM

## 2020-01-16 DIAGNOSIS — E78.00 ELEVATED CHOLESTEROL: ICD-10-CM

## 2020-01-16 DIAGNOSIS — Z00.00 ROUTINE PHYSICAL EXAMINATION: Primary | ICD-10-CM

## 2020-01-16 DIAGNOSIS — R68.89 HEAT INTOLERANCE: ICD-10-CM

## 2020-01-16 DIAGNOSIS — D75.1 SECONDARY POLYCYTHEMIA: ICD-10-CM

## 2020-01-16 NOTE — PROGRESS NOTES
1. Have you been to the ER, urgent care clinic since your last visit? Hospitalized since your last visit? Yes 07- HVED panic attack     2. Have you seen or consulted any other health care providers outside of the [address] [place] [address] since your last visit? Include any pap smears or colon screening.  No

## 2020-01-16 NOTE — PATIENT INSTRUCTIONS
Well Visit, Ages 25 to 48: Care Instructions  Your Care Instructions    Physical exams can help you stay healthy. Your doctor has checked your overall health and may have suggested ways to take good care of yourself. He or she also may have recommended tests. At home, you can help prevent illness with healthy eating, regular exercise, and other steps. Follow-up care is a key part of your treatment and safety. Be sure to make and go to all appointments, and call your doctor if you are having problems. It's also a good idea to know your test results and keep a list of the medicines you take. How can you care for yourself at home? · Reach and stay at a healthy weight. This will lower your risk for many problems, such as obesity, diabetes, heart disease, and high blood pressure. · Get at least 30 minutes of physical activity on most days of the week. Walking is a good choice. You also may want to do other activities, such as running, swimming, cycling, or playing tennis or team sports. Discuss any changes in your exercise program with your doctor. · Do not smoke or allow others to smoke around you. If you need help quitting, talk to your doctor about stop-smoking programs and medicines. These can increase your chances of quitting for good. · Talk to your doctor about whether you have any risk factors for sexually transmitted infections (STIs). Having one sex partner (who does not have STIs and does not have sex with anyone else) is a good way to avoid these infections. · Use birth control if you do not want to have children at this time. Talk with your doctor about the choices available and what might be best for you. · Protect your skin from too much sun. When you're outdoors from 10 a.m. to 4 p.m., stay in the shade or cover up with clothing and a hat with a wide brim. Wear sunglasses that block UV rays. Even when it's cloudy, put broad-spectrum sunscreen (SPF 30 or higher) on any exposed skin.   · See a dentist one or two times a year for checkups and to have your teeth cleaned. · Wear a seat belt in the car. Follow your doctor's advice about when to have certain tests. These tests can spot problems early. For everyone  · Cholesterol. Have the fat (cholesterol) in your blood tested after age 21. Your doctor will tell you how often to have this done based on your age, family history, or other things that can increase your risk for heart disease. · Blood pressure. Have your blood pressure checked during a routine doctor visit. Your doctor will tell you how often to check your blood pressure based on your age, your blood pressure results, and other factors. · Vision. Talk with your doctor about how often to have a glaucoma test.  · Diabetes. Ask your doctor whether you should have tests for diabetes. · Colon cancer. Your risk for colorectal cancer gets higher as you get older. Some experts say that adults should start regular screening at age 48 and stop at age 76. Others say to start before age 48 or continue after age 76. Talk with your doctor about your risk and when to start and stop screening. For women  · Breast exam and mammogram. Talk to your doctor about when you should have a clinical breast exam and a mammogram. Medical experts differ on whether and how often women under 50 should have these tests. Your doctor can help you decide what is right for you. · Cervical cancer screening test and pelvic exam. Begin with a Pap test at age 24. The test often is part of a pelvic exam. Starting at age [address] [address] Figueroa, you may choose to have a Pap test, an HPV test, or both tests at the same time (called co-testing). Talk with your doctor about how often to have testing. · Tests for sexually transmitted infections (STIs). Ask whether you should have tests for STIs. You may be at risk if you have sex with more than one person, especially if your partners do not wear condoms.   For men  · Tests for sexually transmitted infections (STIs). Ask whether you should have tests for STIs. You may be at risk if you have sex with more than one person, especially if you do not wear a condom. · Testicular cancer exam. Ask your doctor whether you should check your testicles regularly. · Prostate exam. Talk to your doctor about whether you should have a blood test (called a PSA test) for prostate cancer. Experts differ on whether and when men should have this test. Some experts suggest it if you are older than 39 and are -American or have a father or brother who got prostate cancer when he was younger than 72. When should you call for help? Watch closely for changes in your health, and be sure to contact your doctor if you have any problems or symptoms that concern you. Where can you learn more? Go to http://navi-zofia.info/. Enter P072 in the search box to learn more about \"Well Visit, Ages 25 to 48: Care Instructions. \"  Current as of: December 13, 2018  Content Version: 12.2  © 4579-6251 [employer]. Care instructions adapted under license by [employer] (which disclaims liability or warranty for this information). If you have questions about a medical condition or this instruction, always ask your healthcare professional. [place] [address] any warranty or liability for your use of this information.

## 2020-01-16 NOTE — PROGRESS NOTES
Patient: Alison Odell MRN: [id number]  SSN: [id number]    YOB: 1980  Age: 37 y.o. Sex: female      Date of Service: 2020   Provider: MARKUS Canales   Office Location:   [address] [place], [address]  Office Phone: [phone]  Office Fax: [phone]        REASON FOR VISIT:   Chief Complaint   Patient presents with    Physical        VITALS:   Visit Vitals  /72 (BP 1 Location: Left arm, BP Patient Position: Sitting)   Pulse 72   Temp 98.3 °F (36.8 °C) (Oral)   Resp 16   Ht 5' 8\" (1.727 m)   Wt 178 lb (80.7 kg)   LMP 10/29/2014   SpO2 98%   BMI 27.06 kg/m²       MEDICATIONS:   Current Outpatient Medications on File Prior to Visit   Medication Sig Dispense Refill    OTHER Weekly Allergy Injections, which started In 2019      albuterol (PROVENTIL HFA, VENTOLIN HFA, PROAIR HFA) 90 mcg/actuation inhaler Take 2 Puffs by inhalation every four (4) hours as needed for Wheezing. 1 Inhaler 2    montelukast (SINGULAIR) 10 mg tablet Take 1 Tab by mouth daily. 30 Tab 11    ALPRAZolam (XANAX) 0.5 mg tablet Take 1/2 to 1 tab daily as needed for anxiety MDD: 1 tab 30 Tab 0     No current facility-administered medications on file prior to visit.          ALLERGIES:   Allergies   Allergen Reactions    Cat Dander Shortness of Breath and Runny Nose    Lexapro [Escitalopram Oxalate] Other (comments)     dilated pupils and blurry vision    Dog Dander Shortness of Breath and Runny Nose        PAST MEDICAL HISTORY:  Past Medical History:   Diagnosis Date    Discoid lupus     skin lupus    Hypertension     during pregnancy/6 months after    IBS (irritable bowel syndrome) 2016    Menorrhagia     hysterectomy    Polycythemia         SURGICAL HISTORY:  Past Surgical History:   Procedure Laterality Date    DELIVERY       HX DILATION AND CURETTAGE      HX GYN      laproscopy    HX HYSTERECTOMY          FAMILY HISTORY:  Family History   Problem Relation Age of Onset   Man Mercedes Alcohol abuse Mother     Drug Abuse Mother    Man Mercedes Arthritis-rheumatoid Mother     Hypertension Father     Hypertension Sister     High Cholesterol Sister     Dementia Sister     Stroke Sister     Arthritis-rheumatoid Maternal Grandmother     Alcohol abuse Maternal Grandfather     Arthritis-rheumatoid Maternal Grandfather     Other Maternal Grandfather         ruptured bowel    Stroke Sister         SOCIAL HISTORY:  Social History     Socioeconomic History    Marital status:      Spouse name: Not on file    Number of children: Not on file    Years of education: Not on file    Highest education level: Not on file   Occupational History    Occupation:    Tobacco Use    Smoking status: Current Every Day Smoker     Packs/day: 0.50     Years: 15.00     Pack years: 7.50     Types: Cigarettes    Smokeless tobacco: Never Used    Tobacco comment: 1998   Substance and Sexual Activity    Alcohol use: Yes     Alcohol/week: 0.0 standard drinks     Types: 1 - 4 Glasses of wine per week     Comment: occ    Drug use: No    Sexual activity: Yes     Partners: Male     Birth control/protection: None        HEALTH MAINTENANCE:  Health Maintenance   Topic Date Due    Pneumococcal 0-64 years (1 of 1 - PPSV23) 05/30/1984    BREAST CANCER SCRN MAMMOGRAM  09/27/2019    PAP AKA CERVICAL CYTOLOGY  06/13/2020    DTaP/Tdap/Td series (2 - Td) 12/20/2022    Influenza Age 9 to Adult  Completed           HPI:  Leatha Groves is a 37 y.o. female who presents to the office for a routine health maintenance exam.   Breast and gynecologic care are done elsewhere by her ob/gyn physician. Anxiety -  Followed by psychiatry, doing well overall. Patient reports she quit her job which was a source of stress for her. Taking Xanax PRN only      Chronic Sinusitis -   Following with Dr. Viri Roe Doing reasonably well on allergy shots.    Polycythemia -   Thought to be secondary to tobacco abuse. Followed regularly by heme/onc for CBC checks and phlebotomy as needed. Heat Intolerance/Elevated T4 -   Patient reports she has been experiencing some hot flashes intermittently and has been very sensitive to warm weather. She mentioned this to her gynecologist, who ordered some labs. She states that hormone testing was normal, as was her TSH, however her T4 was a bit elevated, and she wonders if her symptoms could be due to a thyroid disorder.        REVIEW OF SYSTEMS:   Review of Systems   Constitutional: Negative for chills, fever and malaise/fatigue. HENT: Negative for congestion and sinus pain. Respiratory: Negative for cough, shortness of breath and wheezing. Cardiovascular: Negative for chest pain, palpitations and leg swelling. Gastrointestinal: Negative for abdominal pain, constipation, diarrhea, nausea and vomiting. Neurological: Negative for dizziness and headaches. Endo/Heme/Allergies: Positive for environmental allergies. PHYSICAL EXAMINATION:   Physical Exam  HENT:      Head: Normocephalic and atraumatic. Right Ear: Tympanic membrane and external ear normal.      Left Ear: Tympanic membrane and external ear normal.      Nose: Nose normal.      Mouth/Throat:      Pharynx: Uvula midline. Eyes:      General:         Right eye: No discharge. Left eye: No discharge. Conjunctiva/sclera: Conjunctivae normal.      Pupils: Pupils are equal, round, and reactive to light. Neck:      Musculoskeletal: Neck supple. Thyroid: No thyromegaly. Cardiovascular:      Rate and Rhythm: Normal rate and regular rhythm. Heart sounds: Normal heart sounds. No murmur. No friction rub. No gallop. Pulmonary:      Effort: Pulmonary effort is normal.      Breath sounds: Normal breath sounds. No stridor. No wheezing or rales. Abdominal:      General: Bowel sounds are normal. There is no distension. Palpations: Abdomen is soft. There is no mass. Tenderness: There is no tenderness. There is no guarding or rebound. Lymphadenopathy:      Cervical: No cervical adenopathy. Skin:     General: Skin is warm and dry. Findings: No rash. Neurological:      Mental Status: She is alert and oriented to person, place, and time. Gait: Gait is intact. Psychiatric:         Mood and Affect: Mood and affect normal.         Cognition and Memory: Memory normal.          ASSESSMENT/PLAN:  Diagnoses and all orders for this visit:    1. Routine physical examination  - Normal physical exam findings as detailed above  - Health screenings reviewed   - Age and gender specific counseling provided      2. Anxiety  - Stable on current regimen, largely situational  - continue to f/u with psych for med management     3. Chronic sinusitis, unspecified location  - stable, followed by ENT. Available records reviewed    4. Secondary polycythemia  - stable, followed by hematology. Available records reviewed     5. Heat intolerance  6. Elevated serum free T4 level  - Will plan to repeat labs in about 4 weeks. If there is a persistent abnormality or elevated antibodies, consider endocrine evaluation  Orders:    -     TSH 3RD GENERATION; Future  -     T4, FREE; Future  -     T3, FREE; Future  -     THYROID ANTIBODY PANEL; Future    7. Elevated cholesterol  - monitoring for now  - update yearly fasting labs  Orders:    -     METABOLIC PANEL, COMPREHENSIVE; Future  -     LIPID PANEL; Future      Follow-up and Dispositions    · Return in about 1 year (around 1/16/2021) for annual physicial and fasting to be done 3-4 weeks .           MARKUS Milton  1/17/2020   2:19 PM

## 2020-01-21 ENCOUNTER — HOSPITAL ENCOUNTER (OUTPATIENT)
Dept: MAMMOGRAPHY | Age: 40
Discharge: HOME | End: 2020-01-21
Attending: OBSTETRICS & GYNECOLOGY
Payer: COMMERCIAL

## 2020-01-21 DIAGNOSIS — Z12.31 VISIT FOR SCREENING MAMMOGRAM: ICD-10-CM

## 2020-01-21 PROCEDURE — 77063 BREAST TOMOSYNTHESIS BI: CPT

## 2020-01-24 ENCOUNTER — CLINICAL SUPPORT (OUTPATIENT)
Dept: ONCOLOGY | Age: 40
End: 2020-01-24

## 2020-01-24 ENCOUNTER — HOSPITAL ENCOUNTER (OUTPATIENT)
Dept: INFUSION THERAPY | Age: 40
Discharge: HOME | End: 2020-01-24
Payer: COMMERCIAL

## 2020-01-24 ENCOUNTER — HOSPITAL ENCOUNTER (OUTPATIENT)
Dept: ONCOLOGY | Age: 40
Discharge: HOME | End: 2020-01-24

## 2020-01-24 VITALS
OXYGEN SATURATION: 99 % | DIASTOLIC BLOOD PRESSURE: 78 MMHG | TEMPERATURE: 99 F | HEART RATE: 95 BPM | SYSTOLIC BLOOD PRESSURE: 125 MMHG | RESPIRATION RATE: 16 BRPM

## 2020-01-24 DIAGNOSIS — D75.1 POLYCYTHEMIA, SECONDARY: Primary | ICD-10-CM

## 2020-01-24 DIAGNOSIS — D75.1 POLYCYTHEMIA, SECONDARY: ICD-10-CM

## 2020-01-24 LAB
BASO+EOS+MONOS # BLD AUTO: 0.8 K/UL (ref 0–2.3)
BASO+EOS+MONOS NFR BLD AUTO: 18 % (ref 0.1–17)
DIFFERENTIAL METHOD BLD: ABNORMAL
ERYTHROCYTE [DISTWIDTH] IN BLOOD BY AUTOMATED COUNT: 12.8 % (ref 11.5–14.5)
HCT VFR BLD AUTO: 43.6 % (ref 36–48)
HGB BLD-MCNC: 15.1 G/DL (ref 12–16)
LYMPHOCYTES # BLD: 1.4 K/UL (ref 1.1–5.9)
LYMPHOCYTES NFR BLD: 30 % (ref 14–44)
MCH RBC QN AUTO: 34.1 PG (ref 25–35)
MCHC RBC AUTO-ENTMCNC: 34.6 G/DL (ref 31–37)
MCV RBC AUTO: 98.4 FL (ref 78–102)
NEUTS SEG # BLD: 2.3 K/UL (ref 1.8–9.5)
NEUTS SEG NFR BLD: 52 % (ref 40–70)
PLATELET # BLD AUTO: 175 K/UL (ref 140–440)
RBC # BLD AUTO: 4.43 M/UL (ref 4.1–5.1)
WBC # BLD AUTO: 4.5 K/UL (ref 4.5–13)

## 2020-01-24 PROCEDURE — 99195 PHLEBOTOMY: CPT

## 2020-01-24 PROCEDURE — 74011250636 HC RX REV CODE- 250/636: Performed by: INTERNAL MEDICINE

## 2020-01-24 PROCEDURE — 36415 COLL VENOUS BLD VENIPUNCTURE: CPT

## 2020-01-24 RX ORDER — SODIUM CHLORIDE 9 MG/ML
500 INJECTION, SOLUTION INTRAVENOUS CONTINUOUS
Status: DISCONTINUED | OUTPATIENT
Start: 2020-01-24 | End: 2020-01-25 | Stop reason: HOSPADM

## 2020-01-24 RX ADMIN — SODIUM CHLORIDE 500 ML: 9 INJECTION, SOLUTION INTRAVENOUS at 11:30

## 2020-01-24 NOTE — PROGRESS NOTES
[facility] Progress Note    Date: 2020    Name: Fabiola Shetty    MRN: [id number]         : 1980      Ms. Jules Taylor arrived in the [facility] today at 21 , in stable condition, here for CBC/Phlebotomy (Weekly Status). She was assessed and education was provided. Ms. Carrol Lundborg vitals were reviewed. Visit Vitals  BP (!) 146/95 (BP 1 Location: Right arm, BP Patient Position: Sitting)   Pulse 87   Temp 97.8 °F (36.6 °C)   Resp 16   SpO2 99%         Blood for a CBC was drawn from her left AC at 1024, without incident. Lab results were obtained and reviewed. Recent Results (from the past 12 hour(s))   CBC WITH 3 PART DIFF    Collection Time: 20 10:24 AM   Result Value Ref Range    WBC 4.5 4.5 - 13.0 K/uL    RBC 4.43 4.10 - 5.10 M/uL    HGB 15.1 12.0 - 16.0 g/dL    HCT 43.6 36 - 48 %    MCV 98.4 78 - 102 FL    MCH 34.1 25.0 - 35.0 PG    MCHC 34.6 31 - 37 g/dL    RDW 12.8 11.5 - 14.5 %    PLATELET 396 904 - 563 K/uL    NEUTROPHILS 52 40 - 70 %    MIXED CELLS 18 (H) 0.1 - 17 %    LYMPHOCYTES 30 14 - 44 %    ABS. NEUTROPHILS 2.3 1.8 - 9.5 K/UL    ABS. MIXED CELLS 0.8 0.0 - 2.3 K/uL    ABS. LYMPHOCYTES 1.4 1.1 - 5.9 K/UL    DF AUTOMATED                Phlebotomy was indicated today, per order, for HCT > 42.0. . (weekly status protocol)      PIV was established in her left AC at 1050 (# 20 G) without incident, and phlebotomy was started. Snack was offered and provided. Phlebotomy was completed without incident at 1130, after having obtained 500 ml blood, per order.  ml IV Bolus, was administered post phlebotomy per order, and also without incident. After the completion of the IV NS Bolus, the PIV was removed and gauze/bandaid & Coban was applied. Ms. Jules Taylor tolerated well, and had no complaints. Ms. Jules Taylor was discharged from [place] [address] in stable condition at 1215. Jules Valdivia  She is to return on next Friday, 20, at 1100,  for her next appointment, for CBC/Phlebotomy (Weekly Status).     Mel Garcia RN  January 24, 2020  4:03 PM

## 2020-01-31 ENCOUNTER — CLINICAL SUPPORT (OUTPATIENT)
Dept: ONCOLOGY | Age: 40
End: 2020-01-31

## 2020-01-31 ENCOUNTER — HOSPITAL ENCOUNTER (OUTPATIENT)
Dept: ONCOLOGY | Age: 40
Discharge: HOME | End: 2020-01-31

## 2020-01-31 ENCOUNTER — HOSPITAL ENCOUNTER (OUTPATIENT)
Dept: INFUSION THERAPY | Age: 40
Discharge: HOME | End: 2020-01-31
Payer: COMMERCIAL

## 2020-01-31 VITALS
TEMPERATURE: 98.1 F | SYSTOLIC BLOOD PRESSURE: 135 MMHG | OXYGEN SATURATION: 100 % | RESPIRATION RATE: 16 BRPM | DIASTOLIC BLOOD PRESSURE: 84 MMHG | HEART RATE: 92 BPM

## 2020-01-31 DIAGNOSIS — D75.1 POLYCYTHEMIA, SECONDARY: ICD-10-CM

## 2020-01-31 DIAGNOSIS — D75.1 POLYCYTHEMIA, SECONDARY: Primary | ICD-10-CM

## 2020-01-31 LAB
BASO+EOS+MONOS # BLD AUTO: 0.6 K/UL (ref 0–2.3)
BASO+EOS+MONOS NFR BLD AUTO: 13 % (ref 0.1–17)
DIFFERENTIAL METHOD BLD: ABNORMAL
ERYTHROCYTE [DISTWIDTH] IN BLOOD BY AUTOMATED COUNT: 12.3 % (ref 11.5–14.5)
HCT VFR BLD AUTO: 38.5 % (ref 36–48)
HGB BLD-MCNC: 13.2 G/DL (ref 12–16)
LYMPHOCYTES # BLD: 1.6 K/UL (ref 1.1–5.9)
LYMPHOCYTES NFR BLD: 36 % (ref 14–44)
MCH RBC QN AUTO: 33.6 PG (ref 25–35)
MCHC RBC AUTO-ENTMCNC: 34.3 G/DL (ref 31–37)
MCV RBC AUTO: 98 FL (ref 78–102)
NEUTS SEG # BLD: 2.2 K/UL (ref 1.8–9.5)
NEUTS SEG NFR BLD: 51 % (ref 40–70)
PLATELET # BLD AUTO: 191 K/UL (ref 140–440)
RBC # BLD AUTO: 3.93 M/UL (ref 4.1–5.1)
WBC # BLD AUTO: 4.4 K/UL (ref 4.5–13)

## 2020-01-31 PROCEDURE — 36415 COLL VENOUS BLD VENIPUNCTURE: CPT

## 2020-01-31 NOTE — PROGRESS NOTES
[facility] Progress Note    Date: 2020    Name: Damián Byers    MRN: [id number]         : 1980      Ms. Alejandra Degroot arrived in the [facility] today at [phone], in stable condition, here for CBC/Phlebotomy (Weekly Status). She was assessed and education was provided. Ms. Juanjo Ruiz vitals were reviewed. Visit Vitals  /84 (BP 1 Location: Left arm, BP Patient Position: Sitting)   Pulse 92   Temp 98.1 °F (36.7 °C)   Resp 16   SpO2 100%         Blood for a CBC was drawn from her left AC at 1126, without incident. Lab results were obtained and reviewed. Recent Results (from the past 12 hour(s))   CBC WITH 3 PART DIFF    Collection Time: 20 11:26 AM   Result Value Ref Range    WBC 4.4 (L) 4.5 - 13.0 K/uL    RBC 3.93 (L) 4.10 - 5.10 M/uL    HGB 13.2 12.0 - 16.0 g/dL    HCT 38.5 36 - 48 %    MCV 98.0 78 - 102 FL    MCH 33.6 25.0 - 35.0 PG    MCHC 34.3 31 - 37 g/dL    RDW 12.3 11.5 - 14.5 %    PLATELET 568 507 - 213 K/uL    NEUTROPHILS 51 40 - 70 %    MIXED CELLS 13 0.1 - 17 %    LYMPHOCYTES 36 14 - 44 %    ABS. NEUTROPHILS 2.2 1.8 - 9.5 K/UL    ABS. MIXED CELLS 0.6 0.0 - 2.3 K/uL    ABS. LYMPHOCYTES 1.6 1.1 - 5.9 K/UL    DF AUTOMATED              Phlebotomy was HELD today per order, for HCT now < 42.0. Ms. Alejandra Degroot tolerated well, and had no complaints. Ms. Alejandra Degroot was discharged from [place] [address] in stable condition at 1155. .. She is to return in 6 weeks, on Friday, 3-13-20, at 1100,  for her next appointment, for CBC/Phlebotomy (Q 6 Week Status).     Trupti Quintana RN  2020  4:03 PM

## 2020-02-13 ENCOUNTER — HOSPITAL ENCOUNTER (OUTPATIENT)
Dept: LAB | Age: 40
Discharge: HOME | End: 2020-02-13
Payer: COMMERCIAL

## 2020-02-13 DIAGNOSIS — R68.89 HEAT INTOLERANCE: ICD-10-CM

## 2020-02-13 DIAGNOSIS — E78.00 ELEVATED CHOLESTEROL: ICD-10-CM

## 2020-02-13 DIAGNOSIS — R79.89 ELEVATED SERUM FREE T4 LEVEL: ICD-10-CM

## 2020-02-13 LAB
ALBUMIN SERPL-MCNC: 4 G/DL (ref 3.4–5)
ALBUMIN/GLOB SERPL: 1.2 {RATIO} (ref 0.8–1.7)
ALP SERPL-CCNC: 73 U/L (ref 45–117)
ALT SERPL-CCNC: 22 U/L (ref 13–56)
ANION GAP SERPL CALC-SCNC: 4 MMOL/L (ref 3–18)
AST SERPL-CCNC: 23 U/L (ref 10–38)
BILIRUB SERPL-MCNC: 0.3 MG/DL (ref 0.2–1)
BUN SERPL-MCNC: 10 MG/DL (ref 7–18)
BUN/CREAT SERPL: 14 (ref 12–20)
CALCIUM SERPL-MCNC: 8.8 MG/DL (ref 8.5–10.1)
CHLORIDE SERPL-SCNC: 104 MMOL/L (ref 100–111)
CHOLEST SERPL-MCNC: 233 MG/DL
CO2 SERPL-SCNC: 29 MMOL/L (ref 21–32)
CREAT SERPL-MCNC: 0.71 MG/DL (ref 0.6–1.3)
GLOBULIN SER CALC-MCNC: 3.4 G/DL (ref 2–4)
GLUCOSE SERPL-MCNC: 73 MG/DL (ref 74–99)
HDLC SERPL-MCNC: 106 MG/DL (ref 40–60)
HDLC SERPL: 2.2 {RATIO} (ref 0–5)
LDLC SERPL CALC-MCNC: 115.2 MG/DL (ref 0–100)
LIPID PROFILE,FLP: ABNORMAL
POTASSIUM SERPL-SCNC: 4.5 MMOL/L (ref 3.5–5.5)
PROT SERPL-MCNC: 7.4 G/DL (ref 6.4–8.2)
SODIUM SERPL-SCNC: 137 MMOL/L (ref 136–145)
T3FREE SERPL-MCNC: 3.1 PG/ML (ref 2.18–3.98)
T4 FREE SERPL-MCNC: 1 NG/DL (ref 0.7–1.5)
TRIGL SERPL-MCNC: 59 MG/DL (ref ?–150)
TSH SERPL DL<=0.05 MIU/L-ACNC: 3.57 UIU/ML (ref 0.36–3.74)
VLDLC SERPL CALC-MCNC: 11.8 MG/DL

## 2020-02-13 PROCEDURE — 80061 LIPID PANEL: CPT

## 2020-02-13 PROCEDURE — 80053 COMPREHEN METABOLIC PANEL: CPT

## 2020-02-13 PROCEDURE — 36415 COLL VENOUS BLD VENIPUNCTURE: CPT

## 2020-02-13 PROCEDURE — 84481 FREE ASSAY (FT-3): CPT

## 2020-02-13 PROCEDURE — 84439 ASSAY OF FREE THYROXINE: CPT

## 2020-02-13 PROCEDURE — 86376 MICROSOMAL ANTIBODY EACH: CPT

## 2020-02-13 PROCEDURE — 84443 ASSAY THYROID STIM HORMONE: CPT

## 2020-02-14 LAB
THYROGLOB AB SERPL-ACNC: <1 IU/ML (ref 0–0.9)
THYROPEROXIDASE AB SERPL-ACNC: 12 IU/ML (ref 0–34)

## 2020-02-17 NOTE — PROGRESS NOTES
Labs were entirely normal, including thyroid levels and thyroid antibodies.    f/u annually or as needed

## 2020-02-17 NOTE — PROGRESS NOTES
[employer] message sent. Luisa Marinelli as well for patient to call [facility] or read [employer] message.

## 2020-03-13 ENCOUNTER — HOSPITAL ENCOUNTER (OUTPATIENT)
Dept: ONCOLOGY | Age: 40
Discharge: HOME | End: 2020-03-13

## 2020-03-13 ENCOUNTER — HOSPITAL ENCOUNTER (OUTPATIENT)
Dept: INFUSION THERAPY | Age: 40
Discharge: HOME | End: 2020-03-13
Payer: COMMERCIAL

## 2020-03-13 ENCOUNTER — CLINICAL SUPPORT (OUTPATIENT)
Dept: ONCOLOGY | Age: 40
End: 2020-03-13

## 2020-03-13 VITALS
SYSTOLIC BLOOD PRESSURE: 143 MMHG | TEMPERATURE: 97.2 F | OXYGEN SATURATION: 100 % | RESPIRATION RATE: 16 BRPM | DIASTOLIC BLOOD PRESSURE: 87 MMHG

## 2020-03-13 DIAGNOSIS — D75.1 POLYCYTHEMIA, SECONDARY: ICD-10-CM

## 2020-03-13 DIAGNOSIS — D75.1 POLYCYTHEMIA, SECONDARY: Primary | ICD-10-CM

## 2020-03-13 LAB
BASO+EOS+MONOS # BLD AUTO: 0.8 K/UL (ref 0–2.3)
BASO+EOS+MONOS NFR BLD AUTO: 15 % (ref 0.1–17)
DIFFERENTIAL METHOD BLD: NORMAL
ERYTHROCYTE [DISTWIDTH] IN BLOOD BY AUTOMATED COUNT: 12 % (ref 11.5–14.5)
HCT VFR BLD AUTO: 42.6 % (ref 36–48)
HGB BLD-MCNC: 13.9 G/DL (ref 12–16)
LYMPHOCYTES # BLD: 1.8 K/UL (ref 1.1–5.9)
LYMPHOCYTES NFR BLD: 33 % (ref 14–44)
MCH RBC QN AUTO: 31 PG (ref 25–35)
MCHC RBC AUTO-ENTMCNC: 32.6 G/DL (ref 31–37)
MCV RBC AUTO: 94.9 FL (ref 78–102)
NEUTS SEG # BLD: 2.7 K/UL (ref 1.8–9.5)
NEUTS SEG NFR BLD: 52 % (ref 40–70)
PLATELET # BLD AUTO: 190 K/UL (ref 140–440)
RBC # BLD AUTO: 4.49 M/UL (ref 4.1–5.1)
WBC # BLD AUTO: 5.3 K/UL (ref 4.5–13)

## 2020-03-13 PROCEDURE — 36415 COLL VENOUS BLD VENIPUNCTURE: CPT

## 2020-03-13 NOTE — PROGRESS NOTES
[facility] Progress Note    Date: 2020    Name: Ashatni Parada    MRN: [id number]         : 1980      Ms. Shaka Santana was assessed and education was provided. Ms. Gabriella Cool vitals were reviewed and patient was observed for 5 minutes prior to treatment. Visit Vitals  /87 (BP 1 Location: Left arm, BP Patient Position: At rest;Sitting)   Temp 97.2 °F (36.2 °C)   Resp 16   SpO2 100%   Breastfeeding No       Lab results were obtained and reviewed. Recent Results (from the past 12 hour(s))   CBC WITH 3 PART DIFF    Collection Time: 20 11:23 AM   Result Value Ref Range    WBC 5.3 4.5 - 13.0 K/uL    RBC 4.49 4. 10 - 5.10 M/uL    HGB 13.9 12.0 - 16.0 g/dL    HCT 42.6 36 - 48 %    MCV 94.9 78 - 102 FL    MCH 31.0 25.0 - 35.0 PG    MCHC 32.6 31 - 37 g/dL    RDW 12.0 11.5 - 14.5 %    PLATELET 644 895 - 157 K/uL    NEUTROPHILS 52 40 - 70 %    MIXED CELLS 15 0.1 - 17 %    LYMPHOCYTES 33 14 - 44 %    ABS. NEUTROPHILS 2.7 1.8 - 9.5 K/UL    ABS. MIXED CELLS 0.8 0.0 - 2.3 K/uL    ABS. LYMPHOCYTES 1.8 1.1 - 5.9 K/UL    DF AUTOMATED       Therapeutic phlebotomy not done for HCT 42.6. Ms. Shaka Santana tolerated the infusion, and had no complaints. Patient armband removed and shredded. Ms. Shaka Santana was discharged from [place] [address] in stable condition at 1135. She is to return on 2020 at 1000 for her next appointment.     Lorrain Dakin, RN  2020  11:37 AM

## 2020-04-24 ENCOUNTER — HOSPITAL ENCOUNTER (OUTPATIENT)
Dept: INFUSION THERAPY | Age: 40
End: 2020-04-24

## 2020-05-01 ENCOUNTER — HOSPITAL ENCOUNTER (OUTPATIENT)
Dept: INFUSION THERAPY | Age: 40
Discharge: HOME | End: 2020-05-01
Payer: COMMERCIAL

## 2020-05-01 VITALS
DIASTOLIC BLOOD PRESSURE: 87 MMHG | TEMPERATURE: 97.5 F | SYSTOLIC BLOOD PRESSURE: 138 MMHG | OXYGEN SATURATION: 99 % | RESPIRATION RATE: 18 BRPM | HEART RATE: 87 BPM

## 2020-05-01 LAB
BASO+EOS+MONOS # BLD AUTO: 0.7 K/UL (ref 0–2.3)
BASO+EOS+MONOS NFR BLD AUTO: 9 % (ref 0.1–17)
DIFFERENTIAL METHOD BLD: NORMAL
ERYTHROCYTE [DISTWIDTH] IN BLOOD BY AUTOMATED COUNT: 13.4 % (ref 11.5–14.5)
HCT VFR BLD AUTO: 42.2 % (ref 36–48)
HGB BLD-MCNC: 14.1 G/DL (ref 12–16)
LYMPHOCYTES # BLD: 2.2 K/UL (ref 1.1–5.9)
LYMPHOCYTES NFR BLD: 28 % (ref 14–44)
MCH RBC QN AUTO: 30.9 PG (ref 25–35)
MCHC RBC AUTO-ENTMCNC: 33.4 G/DL (ref 31–37)
MCV RBC AUTO: 92.5 FL (ref 78–102)
NEUTS SEG # BLD: 5 K/UL (ref 1.8–9.5)
NEUTS SEG NFR BLD: 63 % (ref 40–70)
PLATELET # BLD AUTO: 167 K/UL (ref 140–440)
RBC # BLD AUTO: 4.56 M/UL (ref 4.1–5.1)
WBC # BLD AUTO: 7.9 K/UL (ref 4.5–13)

## 2020-05-01 PROCEDURE — 85025 COMPLETE CBC W/AUTO DIFF WBC: CPT

## 2020-05-01 PROCEDURE — 36415 COLL VENOUS BLD VENIPUNCTURE: CPT

## 2020-05-01 NOTE — PROGRESS NOTES
[facility] Lab Visit:    Lazarus Aviles  1980  [id number]    1667:  Pt arrived ambulatory. Labs drawn peripherally and sent for processing. Pt did not meet parameters to receive therapeutic phlebotomy today. Departed [facility] ambulatory and in no distress. Visit Vitals  /87 (BP 1 Location: Left arm, BP Patient Position: Sitting)   Pulse 87   Temp 97.5 °F (36.4 °C)   Resp 18   SpO2 99%     Recent Results (from the past 12 hour(s))   CBC WITH 3 PART DIFF    Collection Time: 05/01/20  2:20 PM   Result Value Ref Range    WBC 7.9 4.5 - 13.0 K/uL    RBC 4.56 4.10 - 5.10 M/uL    HGB 14.1 12.0 - 16 g/dL    HCT 42.2 36 - 48 %    MCV 92.5 78 - 102 FL    MCH 30.9 25.0 - 35.0 PG    MCHC 33.4 31 - 37 g/dL    RDW 13.4 11.5 - 14.5 %    PLATELET 919 887 - 642 K/uL    NEUTROPHILS 63 40 - 70 %    MIXED CELLS 9 0.1 - 17 %    LYMPHOCYTES 28 14 - 44 %    ABS. NEUTROPHILS 5.0 1.8 - 9.5 K/UL    ABS. MIXED CELLS 0.7 0.0 - 2.3 K/uL    ABS.  LYMPHOCYTES 2.2 1.1 - 5.9 K/UL    DF AUTOMATED

## 2020-06-12 ENCOUNTER — HOSPITAL ENCOUNTER (OUTPATIENT)
Dept: INFUSION THERAPY | Age: 40
Discharge: HOME | End: 2020-06-12
Payer: COMMERCIAL

## 2020-06-12 VITALS
HEART RATE: 102 BPM | DIASTOLIC BLOOD PRESSURE: 82 MMHG | SYSTOLIC BLOOD PRESSURE: 137 MMHG | TEMPERATURE: 97 F | OXYGEN SATURATION: 100 % | RESPIRATION RATE: 18 BRPM

## 2020-06-12 LAB
BASO+EOS+MONOS # BLD AUTO: 0.9 K/UL (ref 0–2.3)
BASO+EOS+MONOS NFR BLD AUTO: 14 % (ref 0.1–17)
DIFFERENTIAL METHOD BLD: NORMAL
ERYTHROCYTE [DISTWIDTH] IN BLOOD BY AUTOMATED COUNT: 13.3 % (ref 11.5–14.5)
HCT VFR BLD AUTO: 42.7 % (ref 36–48)
HGB BLD-MCNC: 14.7 G/DL (ref 12–16)
LYMPHOCYTES # BLD: 2 K/UL (ref 1.1–5.9)
LYMPHOCYTES NFR BLD: 32 % (ref 14–44)
MCH RBC QN AUTO: 32.3 PG (ref 25–35)
MCHC RBC AUTO-ENTMCNC: 34.4 G/DL (ref 31–37)
MCV RBC AUTO: 93.8 FL (ref 78–102)
NEUTS SEG # BLD: 3.4 K/UL (ref 1.8–9.5)
NEUTS SEG NFR BLD: 54 % (ref 40–70)
PLATELET # BLD AUTO: 188 K/UL (ref 140–440)
RBC # BLD AUTO: 4.55 M/UL (ref 4.1–5.1)
WBC # BLD AUTO: 6.3 K/UL (ref 4.5–13)

## 2020-06-12 PROCEDURE — 36415 COLL VENOUS BLD VENIPUNCTURE: CPT

## 2020-06-12 PROCEDURE — 85025 COMPLETE CBC W/AUTO DIFF WBC: CPT

## 2020-06-12 NOTE — PROGRESS NOTES
[facility] Progress Note    Date: 2020    Name: Migue Del Castillo    MRN: [id number]         : 1980      Ms. Kostas Cagle was assessed and education was provided. Ms. Karely Cardozo vitals were reviewed and patient was observed for 5 minutes prior to treatment. Visit Vitals  /82 (BP 1 Location: Right arm, BP Patient Position: Sitting)   Pulse (!) 102   Temp 97 °F (36.1 °C)   Resp 18   SpO2 100%   Breastfeeding No       Lab results were obtained and reviewed. Recent Results (from the past 12 hour(s))   CBC WITH 3 PART DIFF    Collection Time: 20  2:05 PM   Result Value Ref Range    WBC 6.3 4.5 - 13.0 K/uL    RBC 4.55 4.10 - 5.10 M/uL    HGB 14.7 12.0 - 16 g/dL    HCT 42.7 36 - 48 %    MCV 93.8 78 - 102 FL    MCH 32.3 25.0 - 35.0 PG    MCHC 34.4 31 - 37 g/dL    RDW 13.3 11.5 - 14.5 %    PLATELET 603 449 - 308 K/uL    NEUTROPHILS 54 40 - 70 %    MIXED CELLS 14 0.1 - 17 %    LYMPHOCYTES 32 14 - 44 %    ABS. NEUTROPHILS 3.4 1.8 - 9.5 K/UL    ABS. MIXED CELLS 0.9 0.0 - 2.3 K/uL    ABS. LYMPHOCYTES 2.0 1.1 - 5.9 K/UL    DF AUTOMATED       Therapeutic phlebotomy not done for HCT 42.7    Ms. Cortes tolerated the infusion, and had no complaints. Patient armband removed and shredded. Ms. Kostas Cagle was discharged from [place] [address] in stable condition at [phone]. She is to return on  2020 at 1400 for her next appointment.     Rosana Ludwig RN  2020

## 2020-06-16 DIAGNOSIS — J32.9 CHRONIC SINUSITIS, UNSPECIFIED LOCATION: ICD-10-CM

## 2020-06-16 RX ORDER — ALBUTEROL SULFATE 90 UG/1
2 AEROSOL, METERED RESPIRATORY (INHALATION)
Qty: 1 INHALER | Refills: 2 | Status: SHIPPED | OUTPATIENT
Start: 2020-06-16 | End: 2020-09-09 | Stop reason: SDUPTHER

## 2020-06-16 NOTE — TELEPHONE ENCOUNTER
This patient contacted office for the following prescriptions to be filled:    Medication requested :   Requested Prescriptions     Pending Prescriptions Disp Refills    albuterol (PROVENTIL HFA, VENTOLIN HFA, PROAIR HFA) 90 mcg/actuation inhaler 1 Inhaler 2     Sig: Take 2 Puffs by inhalation every four (4) hours as needed for Wheezing.      PCP: [place] or [facility]  Mail order or Local pharmacy [phone]    Scheduled appointment if not seen by current providers in office: lov 1/19/2020

## 2020-06-16 NOTE — TELEPHONE ENCOUNTER
Last OV: 01-  Last labs:02-  Next OV and labs: 01- (schedule with Dr. Maylin Paredes for annual physical)

## 2020-06-17 ENCOUNTER — HOSPITAL ENCOUNTER (OUTPATIENT)
Dept: CT IMAGING | Age: 40
Discharge: HOME | End: 2020-06-17
Attending: OTOLARYNGOLOGY
Payer: COMMERCIAL

## 2020-06-17 DIAGNOSIS — J32.9 CHRONIC SINUSITIS: ICD-10-CM

## 2020-06-17 DIAGNOSIS — J32.0 CHRONIC MAXILLARY SINUSITIS: ICD-10-CM

## 2020-06-17 PROCEDURE — 70486 CT MAXILLOFACIAL W/O DYE: CPT

## 2020-07-24 ENCOUNTER — APPOINTMENT (OUTPATIENT)
Dept: INFUSION THERAPY | Age: 40
End: 2020-07-24
Payer: COMMERCIAL

## 2020-07-29 ENCOUNTER — HOSPITAL ENCOUNTER (OUTPATIENT)
Dept: INFUSION THERAPY | Age: 40
Discharge: HOME | End: 2020-07-29
Payer: COMMERCIAL

## 2020-07-29 VITALS
SYSTOLIC BLOOD PRESSURE: 138 MMHG | HEART RATE: 83 BPM | TEMPERATURE: 98.3 F | OXYGEN SATURATION: 98 % | DIASTOLIC BLOOD PRESSURE: 83 MMHG | RESPIRATION RATE: 18 BRPM

## 2020-07-29 LAB
BASO+EOS+MONOS # BLD AUTO: 0.7 K/UL (ref 0–2.3)
BASO+EOS+MONOS NFR BLD AUTO: 13 % (ref 0.1–17)
DIFFERENTIAL METHOD BLD: NORMAL
ERYTHROCYTE [DISTWIDTH] IN BLOOD BY AUTOMATED COUNT: 12.6 % (ref 11.5–14.5)
HCT VFR BLD AUTO: 42.4 % (ref 36–48)
HGB BLD-MCNC: 14.5 G/DL (ref 12–16)
LYMPHOCYTES # BLD: 1.9 K/UL (ref 1.1–5.9)
LYMPHOCYTES NFR BLD: 34 % (ref 14–44)
MCH RBC QN AUTO: 33 PG (ref 25–35)
MCHC RBC AUTO-ENTMCNC: 34.2 G/DL (ref 31–37)
MCV RBC AUTO: 96.6 FL (ref 78–102)
NEUTS SEG # BLD: 3.1 K/UL (ref 1.8–9.5)
NEUTS SEG NFR BLD: 53 % (ref 40–70)
PLATELET # BLD AUTO: 173 K/UL (ref 140–440)
RBC # BLD AUTO: 4.39 M/UL (ref 4.1–5.1)
WBC # BLD AUTO: 5.7 K/UL (ref 4.5–13)

## 2020-07-29 PROCEDURE — 85025 COMPLETE CBC W/AUTO DIFF WBC: CPT

## 2020-07-29 PROCEDURE — 36415 COLL VENOUS BLD VENIPUNCTURE: CPT

## 2020-07-29 NOTE — PROGRESS NOTES
[facility] Lab Visit:    Lazarus Aviles  1980  [id number]    9840:  Pt arrived ambulatory. Labs drawn peripherally in left ac and sent for processing. Pt did not meet parameter for Therapeutic Phlebotomy today. Pt is scheduled to return in 6 weeks. [facility]art[facility] ambulatory and in no distress. Visit Vitals  /83 (BP 1 Location: Right arm, BP Patient Position: Sitting)   Pulse 83   Temp 98.3 °F (36.8 °C)   Resp 18   SpO2 98%     Recent Results (from the past 24 hour(s))   CBC WITH 3 PART DIFF    Collection Time: 07/29/20 11:15 AM   Result Value Ref Range    WBC 5.7 4.5 - 13.0 K/uL    RBC 4.39 4. 10 - 5.10 M/uL    HGB 14.5 12.0 - 16 g/dL    HCT 42.4 36 - 48 %    MCV 96.6 78 - 102 FL    MCH 33.0 25.0 - 35.0 PG    MCHC 34.2 31 - 37 g/dL    RDW 12.6 11.5 - 14.5 %    PLATELET 441 195 - 047 K/uL    NEUTROPHILS 53 40 - 70 %    MIXED CELLS 13 0.1 - 17 %    LYMPHOCYTES 34 14 - 44 %    ABS. NEUTROPHILS 3.1 1.8 - 9.5 K/UL    ABS. MIXED CELLS 0.7 0.0 - 2.3 K/uL    ABS.  LYMPHOCYTES 1.9 1.1 - 5.9 K/UL    DF AUTOMATED

## 2020-09-09 ENCOUNTER — APPOINTMENT (OUTPATIENT)
Dept: INFUSION THERAPY | Age: 40
End: 2020-09-09
Payer: COMMERCIAL

## 2020-09-09 DIAGNOSIS — J32.9 CHRONIC SINUSITIS, UNSPECIFIED LOCATION: ICD-10-CM

## 2020-09-09 RX ORDER — ALBUTEROL SULFATE 90 UG/1
2 AEROSOL, METERED RESPIRATORY (INHALATION)
Qty: 1 INHALER | Refills: 2 | Status: SHIPPED | OUTPATIENT
Start: 2020-09-09 | End: 2021-05-11 | Stop reason: SDUPTHER

## 2020-09-09 NOTE — TELEPHONE ENCOUNTER
LOV 01/16/2020  F/U 01/19/2021  Due to being under [facility] needs her medication sent to [facility].

## 2020-09-11 ENCOUNTER — HOSPITAL ENCOUNTER (OUTPATIENT)
Dept: INFUSION THERAPY | Age: 40
Discharge: HOME | End: 2020-09-11
Payer: COMMERCIAL

## 2020-09-11 VITALS
TEMPERATURE: 98.8 F | SYSTOLIC BLOOD PRESSURE: 132 MMHG | DIASTOLIC BLOOD PRESSURE: 91 MMHG | OXYGEN SATURATION: 100 % | HEART RATE: 90 BPM | RESPIRATION RATE: 18 BRPM

## 2020-09-11 LAB
BASO+EOS+MONOS # BLD AUTO: 0.4 K/UL (ref 0–2.3)
BASO+EOS+MONOS NFR BLD AUTO: 7 % (ref 0.1–17)
DIFFERENTIAL METHOD BLD: NORMAL
ERYTHROCYTE [DISTWIDTH] IN BLOOD BY AUTOMATED COUNT: 12.4 % (ref 11.5–14.5)
HCT VFR BLD AUTO: 46.3 % (ref 36–48)
HGB BLD-MCNC: 16 G/DL (ref 12–16)
LYMPHOCYTES # BLD: 1.4 K/UL (ref 1.1–5.9)
LYMPHOCYTES NFR BLD: 25 % (ref 14–44)
MCH RBC QN AUTO: 33.2 PG (ref 25–35)
MCHC RBC AUTO-ENTMCNC: 34.6 G/DL (ref 31–37)
MCV RBC AUTO: 96.1 FL (ref 78–102)
NEUTS SEG # BLD: 3.9 K/UL (ref 1.8–9.5)
NEUTS SEG NFR BLD: 69 % (ref 40–70)
PLATELET # BLD AUTO: 166 K/UL (ref 140–440)
RBC # BLD AUTO: 4.82 M/UL (ref 4.1–5.1)
WBC # BLD AUTO: 5.7 K/UL (ref 4.5–13)

## 2020-09-11 PROCEDURE — 85025 COMPLETE CBC W/AUTO DIFF WBC: CPT

## 2020-09-11 PROCEDURE — 36415 COLL VENOUS BLD VENIPUNCTURE: CPT

## 2020-09-11 PROCEDURE — 99195 PHLEBOTOMY: CPT

## 2020-09-11 NOTE — PROGRESS NOTES
[facility] Progress Note    Date: 2020    Name: Chirag Ruiz    MRN: [id number]         : 1980      Ms. Luis Arriaga arrived in the [facility] today at 21 , in stable condition, here for CBC/Phlebotomy (Weekly Status). She was assessed and education was provided. Ms. Christina Gonzáles vitals were reviewed. Visit Vitals  BP (!) 132/91 (BP 1 Location: Left arm, BP Patient Position: At rest;Sitting)   Pulse 90   Temp 98.8 °F (37.1 °C)   Resp 18   SpO2 100%   Breastfeeding No         Blood for a CBC was drawn from her right hand without incident. Lab results were obtained and reviewed. Recent Results (from the past 12 hour(s))   CBC WITH 3 PART DIFF    Collection Time: 20 10:30 AM   Result Value Ref Range    WBC 5.7 4.5 - 13.0 K/uL    RBC 4.82 4.10 - 5.10 M/uL    HGB 16.0 12.0 - 16 g/dL    HCT 46.3 36 - 48 %    MCV 96.1 78 - 102 FL    MCH 33.2 25.0 - 35.0 PG    MCHC 34.6 31 - 37 g/dL    RDW 12.4 11.5 - 14.5 %    PLATELET 626 946 - 750 K/uL    NEUTROPHILS 69 40 - 70 %    MIXED CELLS 7 0.1 - 17 %    LYMPHOCYTES 25 14 - 44 %    ABS. NEUTROPHILS 3.9 1.8 - 9.5 K/UL    ABS. MIXED CELLS 0.4 0.0 - 2.3 K/uL    ABS. LYMPHOCYTES 1.4 1.1 - 5.9 K/UL    DF AUTOMATED                Phlebotomy was indicated today, per order, for HCT > 45.0. PIV was established in her left AC (# 20 G) without incident, and phlebotomy was started. Phlebotomy was completed without incident at 1125, after having obtained 500 ml blood, per order.  ml IV Bolus, was administered post phlebotomy per order, and also without incident. After the completion of the IV NS Bolus, the PIV was removed and gauze/bandaid & Coban was applied. Ms. Luis Arriaga tolerated well, and had no complaints. Patient's armband removed and shredded. Ms. Luis Arriaga was discharged from [place] [address] in stable condition at 1200.  She is to return on 20 at 1000, for her next appointment, for CBC/Phlebotomy (Weekly Status).       Marshall Sams  September 11, 2020

## 2020-09-15 RX ORDER — SODIUM CHLORIDE 9 MG/ML
1000 INJECTION, SOLUTION INTRAVENOUS ONCE
Status: SHIPPED | OUTPATIENT
Start: 2020-09-11 | End: 2020-09-11

## 2020-09-18 ENCOUNTER — HOSPITAL ENCOUNTER (OUTPATIENT)
Dept: INFUSION THERAPY | Age: 40
Discharge: HOME | End: 2020-09-18
Payer: COMMERCIAL

## 2020-09-18 VITALS
TEMPERATURE: 98.2 F | RESPIRATION RATE: 18 BRPM | SYSTOLIC BLOOD PRESSURE: 127 MMHG | HEART RATE: 86 BPM | OXYGEN SATURATION: 100 % | DIASTOLIC BLOOD PRESSURE: 85 MMHG

## 2020-09-18 LAB
BASO+EOS+MONOS # BLD AUTO: 0.7 K/UL (ref 0–2.3)
BASO+EOS+MONOS NFR BLD AUTO: 13 % (ref 0.1–17)
DIFFERENTIAL METHOD BLD: NORMAL
ERYTHROCYTE [DISTWIDTH] IN BLOOD BY AUTOMATED COUNT: 12.6 % (ref 11.5–14.5)
HCT VFR BLD AUTO: 40.1 % (ref 36–48)
HGB BLD-MCNC: 14 G/DL (ref 12–16)
LYMPHOCYTES # BLD: 1.6 K/UL (ref 1.1–5.9)
LYMPHOCYTES NFR BLD: 28 % (ref 14–44)
MCH RBC QN AUTO: 33.7 PG (ref 25–35)
MCHC RBC AUTO-ENTMCNC: 34.9 G/DL (ref 31–37)
MCV RBC AUTO: 96.6 FL (ref 78–102)
NEUTS SEG # BLD: 3.6 K/UL (ref 1.8–9.5)
NEUTS SEG NFR BLD: 60 % (ref 40–70)
PLATELET # BLD AUTO: 215 K/UL (ref 140–440)
RBC # BLD AUTO: 4.15 M/UL (ref 4.1–5.1)
WBC # BLD AUTO: 5.9 K/UL (ref 4.5–13)

## 2020-09-18 PROCEDURE — 36415 COLL VENOUS BLD VENIPUNCTURE: CPT

## 2020-09-18 PROCEDURE — 85025 COMPLETE CBC W/AUTO DIFF WBC: CPT

## 2020-11-06 ENCOUNTER — HOSPITAL ENCOUNTER (OUTPATIENT)
Dept: INFUSION THERAPY | Age: 40
Discharge: HOME | End: 2020-11-06
Payer: COMMERCIAL

## 2020-11-06 VITALS
OXYGEN SATURATION: 98 % | SYSTOLIC BLOOD PRESSURE: 146 MMHG | DIASTOLIC BLOOD PRESSURE: 98 MMHG | TEMPERATURE: 97.3 F | HEART RATE: 103 BPM

## 2020-11-06 LAB
BASO+EOS+MONOS # BLD AUTO: 0.8 K/UL (ref 0–2.3)
BASO+EOS+MONOS NFR BLD AUTO: 18 % (ref 0.1–17)
DIFFERENTIAL METHOD BLD: ABNORMAL
ERYTHROCYTE [DISTWIDTH] IN BLOOD BY AUTOMATED COUNT: 12.9 % (ref 11.5–14.5)
HCT VFR BLD AUTO: 44.1 % (ref 36–48)
HGB BLD-MCNC: 14.8 G/DL (ref 12–16)
LYMPHOCYTES # BLD: 1.8 K/UL (ref 1.1–5.9)
LYMPHOCYTES NFR BLD: 39 % (ref 14–44)
MCH RBC QN AUTO: 32 PG (ref 25–35)
MCHC RBC AUTO-ENTMCNC: 33.6 G/DL (ref 31–37)
MCV RBC AUTO: 95.5 FL (ref 78–102)
NEUTS SEG # BLD: 1.9 K/UL (ref 1.8–9.5)
NEUTS SEG NFR BLD: 43 % (ref 40–70)
PLATELET # BLD AUTO: 194 K/UL (ref 140–440)
RBC # BLD AUTO: 4.62 M/UL (ref 4.1–5.1)
WBC # BLD AUTO: 4.5 K/UL (ref 4.5–13)

## 2020-11-06 PROCEDURE — 85025 COMPLETE CBC W/AUTO DIFF WBC: CPT

## 2020-11-06 PROCEDURE — 36415 COLL VENOUS BLD VENIPUNCTURE: CPT

## 2020-11-06 NOTE — PROGRESS NOTES
[facility] Progress Note    Date: 2020    Name: Pearl Sanford    MRN: [id number]         : 1980    Peripheral Lab Draw      Ms. Cortes to [facility], ambulatory at 21  accompanied by self. Pt was assessed and education was provided. Ms. Radha Avalos vitals were reviewed and patient was observed for 5 minutes prior to treatment. Visit Vitals  BP (!) 146/98 (BP 1 Location: Right arm, BP Patient Position: Sitting)   Pulse (!) 103   Temp 97.3 °F (36.3 °C)   SpO2 98%     Recent Results (from the past 12 hour(s))   CBC WITH 3 PART DIFF    Collection Time: 20 10:19 AM   Result Value Ref Range    WBC 4.5 4.5 - 13.0 K/uL    RBC 4.62 4.10 - 5.10 M/uL    HGB 14.8 12.0 - 16 g/dL    HCT 44.1 36 - 48 %    MCV 95.5 78 - 102 FL    MCH 32.0 25.0 - 35.0 PG    MCHC 33.6 31 - 37 g/dL    RDW 12.9 11.5 - 14.5 %    PLATELET 331 325 - 653 K/uL    NEUTROPHILS 43 40 - 70 %    MIXED CELLS 18 (H) 0.1 - 17 %    LYMPHOCYTES 39 14 - 44 %    ABS. NEUTROPHILS 1.9 1.8 - 9.5 K/UL    ABS. MIXED CELLS 0.8 0.0 - 2.3 K/uL    ABS. LYMPHOCYTES 1.8 1.1 - 5.9 K/UL    DF AUTOMATED           Blood obtained peripherally from right arm first attempt with butterfly needle and sent to lab for CBC w/ Diff per written orders. No bleeding or hematoma noted at site. Gauze and coban applied. Ms. Jose De Anda tolerated the venipuncture, and had no complaints. CBC results were shared with the RN Katarina Christine and it was determined that treatment was not needed. Patient armband removed and shredded. Ms. Jose De Anda was discharged from [place] [address] in stable condition at 1025.     Tanisha Ramirez Phlebotomist PCT  2020  3:07 PM

## 2020-12-18 ENCOUNTER — APPOINTMENT (OUTPATIENT)
Dept: INFUSION THERAPY | Age: 40
End: 2020-12-18

## 2021-01-06 ENCOUNTER — HOSPITAL ENCOUNTER (OUTPATIENT)
Dept: INFUSION THERAPY | Age: 41
End: 2021-01-06

## 2021-01-28 ENCOUNTER — APPOINTMENT (OUTPATIENT)
Dept: INFUSION THERAPY | Age: 41
End: 2021-01-28

## 2021-02-15 NOTE — PROGRESS NOTES
Hematology/Oncology  Progress Note    Name: Liya Barker    : 1980    PCP: Jeannette Cleveland DO     Ms. Tanvir Shaw is a 41year old female who was seen for management of her secondary polycythemia. 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 She reports that she has been working on decrease her cigarette smoking. Today she has no other complaints. Denied fever, chills, night sweat, unintentional weight loss, skin lumps or bumps, acute bleeding or bruising issues. No acute bleeding, blood in stool, dark stool, melena, hematochezia, hemoptysis, dark urine, or easily bruising. Denied headache, acute vision change, dizziness, chest pain, worsen shortness of breath, palpitation, productive cough, nausea, vomiting, abdominal pain, altered bowel habits, dysuria, new bone pain or back pain, focal numbness or weakness. Past medical history, family history, and social history: these were reviewed and remains unchanged.     Past Medical History:   Diagnosis Date    Discoid lupus     skin lupus    Hypertension     during pregnancy/6 months after    IBS (irritable bowel syndrome) 2016    Menorrhagia     hysterectomy    Polycythemia      Past Surgical History:   Procedure Laterality Date    DELIVERY       HX DILATION AND CURETTAGE      HX GYN      laproscopy    HX HYSTERECTOMY       Social History     Socioeconomic History    Marital status:      Spouse name: Not on file    Number of children: Not on file    Years of education: Not on file    Highest education level: Not on file   Occupational History    Occupation:    Social Needs    Financial resource strain: Not on file    Food insecurity     Worry: Not on file     Inability: Not on file    Transportation needs     Medical: Not on file     Non-medical: Not on file   Tobacco Use    Smoking status: Current Every Day Smoker     Packs/day: 0.50 Years: 15.00     Pack years: 7.50     Types: Cigarettes    Smokeless tobacco: Never Used    Tobacco comment: 1998   Substance and Sexual Activity    Alcohol use: Yes     Alcohol/week: 0.0 standard drinks     Types: 1 - 4 Glasses of wine per week     Comment: occ    Drug use: No    Sexual activity: Yes     Partners: Male     Birth control/protection: None   Lifestyle    Physical activity     Days per week: Not on file     Minutes per session: Not on file    Stress: Not on file   Relationships    Social connections     Talks on phone: Not on file     Gets together: Not on file     Attends Mosque service: Not on file     Active member of club or organization: Not on file     Attends meetings of clubs or organizations: Not on file     Relationship status: Not on file    Intimate partner violence     Fear of current or ex partner: Not on file     Emotionally abused: Not on file     Physically abused: Not on file     Forced sexual activity: Not on file   Other Topics Concern    Not on file   Social History Narrative    Not on file     Family History   Problem Relation Age of Onset    Alcohol abuse Mother     Drug Abuse Mother     Arthritis-rheumatoid Mother     Hypertension Father     Hypertension Sister     High Cholesterol Sister     Dementia Sister     Stroke Sister     Arthritis-rheumatoid Maternal Grandmother     Alcohol abuse Maternal Grandfather     Arthritis-rheumatoid Maternal Grandfather     Other Maternal Grandfather         ruptured bowel    Stroke Sister      Current Outpatient Medications   Medication Sig Dispense Refill    buPROPion SR (Wellbutrin SR) 200 mg SR tablet Take 200 mg by mouth two (2) times a day.  albuterol (PROVENTIL HFA, VENTOLIN HFA, PROAIR HFA) 90 mcg/actuation inhaler Take 2 Puffs by inhalation every four (4) hours as needed for Wheezing.  1 Inhaler 2    OTHER Weekly Allergy Injections, which started In January 2019      ALPRAZolam Marly Rosario) 0.5 mg tablet Take 1/2 to 1 tab daily as needed for anxiety MDD: 1 tab 30 Tab 0    montelukast (SINGULAIR) 10 mg tablet Take 1 Tab by mouth daily. 30 Tab 11       Review of Systems   Constitutional: Negative for chills, diaphoresis, fever, malaise/fatigue and weight loss. Respiratory: Negative for cough, hemoptysis, shortness of breath and wheezing. Cardiovascular: Negative for chest pain, palpitations and leg swelling. Gastrointestinal: Negative for abdominal pain, diarrhea, heartburn, nausea and vomiting. Genitourinary: Negative for dysuria, frequency, hematuria and urgency. Musculoskeletal: Negative for joint pain and myalgias. Skin: Negative for itching and rash. Neurological: Negative for dizziness, seizures, weakness and headaches. Psychiatric/Behavioral: Negative for depression. The patient does not have insomnia. Objective:     Visit Vitals  BP (!) 141/90 (BP Patient Position: Sitting)   Pulse 95   Temp 98.1 °F (36.7 °C) (Oral)   Resp 16   Wt 109.8 kg (242 lb)   LMP 10/29/2014   SpO2 96%   BMI 36.80 kg/m²       ECOG Performance Status (grade): 0  0 - able to carry on all pre-disease activity w/out restriction  1 - restricted but able to carry out light work  2 - ambulatory and can self- care but unable to carry out work  3 - bed or chair >50% of waking hours  4 - completely disable, total care, confined to bed or chair    Physical Exam  Constitutional:       Appearance: Normal appearance. HENT:      Head: Normocephalic and atraumatic. Eyes:      Pupils: Pupils are equal, round, and reactive to light. Neck:      Musculoskeletal: Neck supple. Cardiovascular:      Rate and Rhythm: Normal rate and regular rhythm. Heart sounds: Normal heart sounds. Pulmonary:      Effort: Pulmonary effort is normal.      Breath sounds: Normal breath sounds. Abdominal:      General: Bowel sounds are normal.      Palpations: Abdomen is soft. Tenderness: There is no abdominal tenderness. There is no guarding. Musculoskeletal: Normal range of motion. Right lower leg: No edema. Left lower leg: No edema. Skin:     General: Skin is warm. Neurological:      General: No focal deficit present. Mental Status: She is alert and oriented to person, place, and time. Mental status is at baseline. Diagnostics:      No results found for this or any previous visit (from the past 96 hour(s)). Imaging:  No results found for this or any previous visit. Results for orders placed during the hospital encounter of 07/26/19   XR CHEST PA LAT    Narrative EXAM: CHEST PA AND LATERAL    CLINICAL HISTORY/INDICATION: Sudden onset right-sided chest pain with right arm  pain, tachycardia, and near syncopal event onset today associated with shortness  of breath     COMPARISON: Test x-ray October 19, 2018. TECHNIQUE: PA and lateral views     FINDINGS:      The cardiac and mediastinal silhouette is normal.  The lungs are clear. The  costophrenic angles are sharply defined. Pulmonary vascularity is normal. No  bony abnormalities are seen. Impression IMPRESSION:    Negative chest.       Results for orders placed during the hospital encounter of 06/17/20   CT SINUSES WO CONT    Narrative CT paranasal sinuses    HISTORY: Chronic maxillary sinusitis. COMPARISON: None. TECHNIQUE: Contiguous axial helical scan to the paranasal sinuses is obtained. Computer coronal and sagittal reconstruction images are also performed for  better evaluation of paranasal sinus mucosa and bony structures from different  projection and also of relationship of sinuses to adjacent structures as well as  for reducing radiation dose.     All CT scans at this facility performed using dose optimization techniques as  appreciated to a performed exam, to include automated exposure control,  adjustment of the mA and or KU according to patient size (including appropriate  matching for site specific examination), or use of iterative reconstruction  technique. FINDINGS: The nasal septum is minimally deviated to the right. . The bilateral  nasal cavities appear patent. Mild mucosal thickening identified in all the  paranasal sinuses. The bilateral osteomeatal complexes appear patent. No  evidence of soft tissue mass or air fluid level identified. Visualized portion  of facial bones and both orbits appear unremarkable. Impression IMPRESSION:    Mild mucosal thickening in paranasal sinuses. No CT evidence of acute sinusitis. Thank you for your referral.            Assessment:     1. Polycythemia, secondary      Plan:   Polycythemia:   --  Patient was being followed by Dr. Bienvenido Chaves who retired. 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 Previous workup showed negative JAK2. She did receive some phlebotomy in the past.  -- 11/6/2020 CBC reported hemoglobin 14.8, hematocrit 44.1%. -- Clinical stable. -- We will continue to monitor CBC. If persistent elevated H/H or symptomatic disease she may need phlebotomy. -- She was advised to f/u her PCP for smoking cessation. Anxiety:   -- She will f/u PCP. -- We will see the patient back in clinic in about 4 months. Always sooner if required. The patient can have lab done prior to our next clinic visit. Orders Placed This Encounter    CBC WITH AUTOMATED DIFF     Standing Status:   Future     Standing Expiration Date:   2/20/2022    buPROPion SR (Wellbutrin SR) 200 mg SR tablet     Sig: Take 200 mg by mouth two (2) times a day. Ms. Kane Mullins has a reminder for a \"due or due soon\" health maintenance. I have asked that she contact her primary care provider for follow-up on this health maintenance. All of patient's questions answered to their apparent satisfaction. They verbally show understanding and agreement with aforementioned plan.          Delfina Lake MD  2/19/2021          About 25 minutes were spent for this encounter with more than 50% of the time spent in face-to-face counseling, discussing on diagnosis and management plan going forward, and co-ordination of care. Parts of this document has been produced using Dragon dictation system. Unrecognized errors in transcription may be present. Please do not hesitate to reach out for any questions or clarifications.       CC: MARKUS Morris

## 2021-02-19 ENCOUNTER — OFFICE VISIT (OUTPATIENT)
Dept: ONCOLOGY | Age: 41
End: 2021-02-19
Payer: COMMERCIAL

## 2021-02-19 VITALS
OXYGEN SATURATION: 96 % | RESPIRATION RATE: 16 BRPM | BODY MASS INDEX: 36.8 KG/M2 | HEART RATE: 95 BPM | WEIGHT: 242 LBS | DIASTOLIC BLOOD PRESSURE: 90 MMHG | SYSTOLIC BLOOD PRESSURE: 141 MMHG | TEMPERATURE: 98.1 F

## 2021-02-19 DIAGNOSIS — D75.1 POLYCYTHEMIA, SECONDARY: Primary | ICD-10-CM

## 2021-02-19 PROCEDURE — 99214 OFFICE O/P EST MOD 30 MIN: CPT | Performed by: INTERNAL MEDICINE

## 2021-02-19 RX ORDER — BUPROPION HYDROCHLORIDE 200 MG/1
200 TABLET, EXTENDED RELEASE ORAL 2 TIMES DAILY
COMMUNITY
End: 2021-05-11 | Stop reason: DRUGHIGH

## 2021-02-20 LAB
BASOPHILS # BLD AUTO: 0.1 X10E3/UL (ref 0–0.2)
BASOPHILS NFR BLD AUTO: 2 %
EOSINOPHIL # BLD AUTO: 0.4 X10E3/UL (ref 0–0.4)
EOSINOPHIL NFR BLD AUTO: 8 %
ERYTHROCYTE [DISTWIDTH] IN BLOOD BY AUTOMATED COUNT: 13 % (ref 11.7–15.4)
HCT VFR BLD AUTO: 45.8 % (ref 34–46.6)
HGB BLD-MCNC: 15.9 G/DL (ref 11.1–15.9)
IMM GRANULOCYTES # BLD AUTO: 0 X10E3/UL (ref 0–0.1)
IMM GRANULOCYTES NFR BLD AUTO: 0 %
LYMPHOCYTES # BLD AUTO: 1.5 X10E3/UL (ref 0.7–3.1)
LYMPHOCYTES NFR BLD AUTO: 31 %
MCH RBC QN AUTO: 33.7 PG (ref 26.6–33)
MCHC RBC AUTO-ENTMCNC: 34.7 G/DL (ref 31.5–35.7)
MCV RBC AUTO: 97 FL (ref 79–97)
MONOCYTES # BLD AUTO: 0.4 X10E3/UL (ref 0.1–0.9)
MONOCYTES NFR BLD AUTO: 7 %
NEUTROPHILS # BLD AUTO: 2.4 X10E3/UL (ref 1.4–7)
NEUTROPHILS NFR BLD AUTO: 52 %
PLATELET # BLD AUTO: 191 X10E3/UL (ref 150–450)
RBC # BLD AUTO: 4.72 X10E6/UL (ref 3.77–5.28)
WBC # BLD AUTO: 4.7 X10E3/UL (ref 3.4–10.8)

## 2021-05-07 DIAGNOSIS — J32.9 CHRONIC SINUSITIS, UNSPECIFIED LOCATION: ICD-10-CM

## 2021-05-07 RX ORDER — ALBUTEROL SULFATE 90 UG/1
AEROSOL, METERED RESPIRATORY (INHALATION)
Qty: 1 INHALER | Refills: 2 | OUTPATIENT
Start: 2021-05-07

## 2021-05-07 NOTE — TELEPHONE ENCOUNTER
Nobody in our office has seen her in over 1 year. Please establish with one of our providers. Who she establishes with depends on if she has OB/GYN or not. I can refill for now after she schedules.

## 2021-05-10 ENCOUNTER — TELEPHONE (OUTPATIENT)
Dept: ONCOLOGY | Age: 41
End: 2021-05-10

## 2021-05-10 DIAGNOSIS — D75.1 POLYCYTHEMIA, SECONDARY: Primary | ICD-10-CM

## 2021-05-10 NOTE — TELEPHONE ENCOUNTER
Pt decided to go to different site for labs instead of coming to the office on 6/7/21. Please be sure the labs orders are in the system for her to get labs drawn at the other site.

## 2021-05-11 ENCOUNTER — VIRTUAL VISIT (OUTPATIENT)
Dept: FAMILY MEDICINE CLINIC | Age: 41
End: 2021-05-11
Payer: COMMERCIAL

## 2021-05-11 DIAGNOSIS — L30.1 DYSHYDROSIS: ICD-10-CM

## 2021-05-11 DIAGNOSIS — D75.1 SECONDARY POLYCYTHEMIA: ICD-10-CM

## 2021-05-11 DIAGNOSIS — J32.9 CHRONIC SINUSITIS, UNSPECIFIED LOCATION: ICD-10-CM

## 2021-05-11 DIAGNOSIS — F41.9 ANXIETY: ICD-10-CM

## 2021-05-11 DIAGNOSIS — J30.9 ALLERGIC RHINITIS, UNSPECIFIED SEASONALITY, UNSPECIFIED TRIGGER: ICD-10-CM

## 2021-05-11 DIAGNOSIS — J45.20 MILD INTERMITTENT ASTHMA, UNSPECIFIED WHETHER COMPLICATED: Primary | ICD-10-CM

## 2021-05-11 PROCEDURE — 99214 OFFICE O/P EST MOD 30 MIN: CPT | Performed by: FAMILY MEDICINE

## 2021-05-11 RX ORDER — CLOBETASOL PROPIONATE 0.5 MG/G
OINTMENT TOPICAL 2 TIMES DAILY
COMMUNITY
End: 2021-05-11 | Stop reason: SDUPTHER

## 2021-05-11 RX ORDER — TRAZODONE HYDROCHLORIDE 100 MG/1
1 TABLET ORAL
COMMUNITY
Start: 2021-03-23 | End: 2022-10-11

## 2021-05-11 RX ORDER — CLOBETASOL PROPIONATE 0.5 MG/G
OINTMENT TOPICAL 2 TIMES DAILY
Qty: 15 G | Refills: 0 | Status: SHIPPED | OUTPATIENT
Start: 2021-05-11 | End: 2022-10-11

## 2021-05-11 RX ORDER — BUSPIRONE HYDROCHLORIDE 10 MG/1
1 TABLET ORAL 2 TIMES DAILY
COMMUNITY
Start: 2021-04-22 | End: 2022-10-11

## 2021-05-11 RX ORDER — ALBUTEROL SULFATE 90 UG/1
2 AEROSOL, METERED RESPIRATORY (INHALATION)
Qty: 1 INHALER | Refills: 2 | Status: SHIPPED | OUTPATIENT
Start: 2021-05-11

## 2021-05-11 RX ORDER — LEVOCETIRIZINE DIHYDROCHLORIDE 5 MG/1
5 TABLET, FILM COATED ORAL
COMMUNITY

## 2021-05-11 RX ORDER — BUPROPION HYDROCHLORIDE 100 MG/1
100 TABLET, EXTENDED RELEASE ORAL 2 TIMES DAILY
COMMUNITY
End: 2022-10-11

## 2021-05-11 NOTE — PROGRESS NOTES
Traci Gipson, was evaluated through a synchronous (real-time) audio-video encounter. The patient (or guardian if applicable) is aware that this is a billable service. Verbal consent to proceed has been obtained within the past 12 months. The visit was conducted pursuant to the emergency declaration under the [facility], [address] [place] [address] and the [employer] General Act. Patient identification was verified, and a caregiver was present when appropriate. The patient was located in a state where the provider was credentialed to provide care. SUBJECTIVE  Chief Complaint   Patient presents with    Allergic Rhinitis    Asthma    Medication Refill    Skin Problem     rash on feet; previously derm prescribed Clobetasol ointment      The patient presents for a few issues. Sees ENT for allergy shots. Takes allergy meds. Has a history of chronic sinusitis. Says that she wakes up daily with chest tightness that improves with albuterol. She says pets are a trigger. She has not tried a maintenance inhaler. Has a recurrent rash on the bottom of her feet that was treated by derm several years ago. She has outbreaks that are better with clobetasol sparingly. She needs a refill. She is under the care of a psychiatrist and counselor. She is taking meds without complaints. OBJECTIVE    Last menstrual period 10/29/2014. General:  Alert, cooperative, well appearing, in no apparent distress. Lungs: Inspiratory and expiratory efforts are full and unlabored. ASSESSMENT / PLAN      ICD-10-CM ICD-9-CM    1. Mild intermittent asthma, unspecified whether complicated  O25.49 215.15 albuterol (PROVENTIL HFA, VENTOLIN HFA, PROAIR HFA) 90 mcg/actuation inhaler      salmeteroL (SEREVENT) 50 mcg/dose dsdv   2. Chronic sinusitis, unspecified location  J32.9 473.9    3.  Allergic rhinitis, unspecified seasonality, unspecified trigger  J30.9 477.9 levocetirizine (Xyzal) 5 mg tablet   4. Dyshydrosis  L30.1 705.81 clobetasoL (TEMOVATE) 0.05 % ointment   5. Secondary polycythemia  D75.1 289.0    6. Anxiety  F41.9 300.00 buPROPion SR (Wellbutrin SR) 100 mg SR tablet      busPIRone (BUSPAR) 10 mg tablet      traZODone (DESYREL) 100 mg tablet     Asthma - advised on a trial of serevent (or similar long acting inhaler) for maintenance rather than over reliance on her rescue inhaler. Chronic sinusitis / allergic rhinitis - cont per ENT. Dyshidrosis - refills of clobetasol. Advised on seeing us in person should this rash not respond. Polycythemia - cont per hem/onc. Anxiety - cont current care per psychiatry and counselor. All chart history elements were reviewed by me at the time of the visit even though marked at time of note closure. Patient understands our medical plan. Patient has provided input and agrees with goals. Alternatives have been explained and offered. All questions answered. The patient is to call if condition worsens or fails to improve. RTC annually.

## 2021-05-11 NOTE — PROGRESS NOTES
1. Have you been to the ER, urgent care clinic since your last visit? Hospitalized since your last visit? No    2. Have you seen or consulted any other health care providers outside of the [address] [place] [address] since your last visit? Include any pap smears or colon screening.  Yes Where: Dr. Alexus Nicholson

## 2021-05-14 ENCOUNTER — TELEPHONE (OUTPATIENT)
Dept: FAMILY MEDICINE CLINIC | Age: 41
End: 2021-05-14

## 2021-05-14 NOTE — TELEPHONE ENCOUNTER
[address] is requesting a prior auth for RX salmeteroL (SEREVENT) 50 mcg/dose dsdv     . Pt states that the pharmacy sent a fax 3 times. I explained that we have yet to receive one but that I would go ahead and send the request to the nurse. Please advise.

## 2021-05-20 ENCOUNTER — TELEPHONE (OUTPATIENT)
Dept: FAMILY MEDICINE CLINIC | Age: 41
End: 2021-05-20

## 2021-05-20 NOTE — TELEPHONE ENCOUNTER
Pt called and stated she has been denied the medication Serevent Diskus. Her insurance require her to try [employer] or [employer] which doesn't require a prior authorization. Please call pt at your earliest convenience.

## 2021-06-03 ENCOUNTER — HOSPITAL ENCOUNTER (OUTPATIENT)
Dept: LAB | Age: 41
Discharge: HOME | End: 2021-06-03
Payer: COMMERCIAL

## 2021-06-03 DIAGNOSIS — D75.1 POLYCYTHEMIA, SECONDARY: ICD-10-CM

## 2021-06-03 LAB
BASOPHILS # BLD: 0.1 K/UL (ref 0–0.1)
BASOPHILS NFR BLD: 2 % (ref 0–2)
DIFFERENTIAL METHOD BLD: ABNORMAL
EOSINOPHIL # BLD: 0.5 K/UL (ref 0–0.4)
EOSINOPHIL NFR BLD: 8 % (ref 0–5)
ERYTHROCYTE [DISTWIDTH] IN BLOOD BY AUTOMATED COUNT: 12.8 % (ref 11.6–14.5)
HCT VFR BLD AUTO: 48.1 % (ref 35–45)
HGB BLD-MCNC: 16.1 G/DL (ref 12–16)
LYMPHOCYTES # BLD: 1.8 K/UL (ref 0.9–3.6)
LYMPHOCYTES NFR BLD: 33 % (ref 21–52)
MCH RBC QN AUTO: 33.3 PG (ref 24–34)
MCHC RBC AUTO-ENTMCNC: 33.5 G/DL (ref 31–37)
MCV RBC AUTO: 99.4 FL (ref 74–97)
MONOCYTES # BLD: 0.5 K/UL (ref 0.05–1.2)
MONOCYTES NFR BLD: 9 % (ref 3–10)
NEUTS SEG # BLD: 2.7 K/UL (ref 1.8–8)
NEUTS SEG NFR BLD: 48 % (ref 40–73)
PLATELET # BLD AUTO: 199 K/UL (ref 135–420)
PMV BLD AUTO: 9.7 FL (ref 9.2–11.8)
RBC # BLD AUTO: 4.84 M/UL (ref 4.2–5.3)
WBC # BLD AUTO: 5.6 K/UL (ref 4.6–13.2)

## 2021-06-03 PROCEDURE — 36415 COLL VENOUS BLD VENIPUNCTURE: CPT

## 2021-06-03 PROCEDURE — 85025 COMPLETE CBC W/AUTO DIFF WBC: CPT

## 2021-06-21 ENCOUNTER — OFFICE VISIT (OUTPATIENT)
Dept: ONCOLOGY | Age: 41
End: 2021-06-21
Payer: COMMERCIAL

## 2021-06-21 VITALS
OXYGEN SATURATION: 99 % | BODY MASS INDEX: 32.98 KG/M2 | RESPIRATION RATE: 14 BRPM | HEART RATE: 97 BPM | WEIGHT: 217.6 LBS | HEIGHT: 68 IN | DIASTOLIC BLOOD PRESSURE: 84 MMHG | TEMPERATURE: 98.4 F | SYSTOLIC BLOOD PRESSURE: 138 MMHG

## 2021-06-21 DIAGNOSIS — D75.1 POLYCYTHEMIA, SECONDARY: Primary | ICD-10-CM

## 2021-06-21 DIAGNOSIS — D75.1 POLYCYTHEMIA: ICD-10-CM

## 2021-06-21 PROCEDURE — 99214 OFFICE O/P EST MOD 30 MIN: CPT | Performed by: INTERNAL MEDICINE

## 2021-06-21 NOTE — PROGRESS NOTES
Particia Raw presents today for   Chief Complaint   Patient presents with    Follow-up       Is someone accompanying this pt? no    Is the patient using any DME equipment during OV? no    Coordination of Care:  1. Have you been to the ER, urgent care clinic since your last visit? Hospitalized since your last visit? no    2. Have you seen or consulted any other health care providers outside of the [address] [place] [address] since your last visit? Include any pap smears or colon screening.  no

## 2021-06-25 ENCOUNTER — HOSPITAL ENCOUNTER (OUTPATIENT)
Dept: INFUSION THERAPY | Age: 41
Discharge: HOME | End: 2021-06-25
Payer: COMMERCIAL

## 2021-06-25 VITALS
HEART RATE: 99 BPM | TEMPERATURE: 98.2 F | DIASTOLIC BLOOD PRESSURE: 84 MMHG | OXYGEN SATURATION: 97 % | RESPIRATION RATE: 16 BRPM | SYSTOLIC BLOOD PRESSURE: 128 MMHG

## 2021-06-25 LAB
BASO+EOS+MONOS # BLD AUTO: 0.5 K/UL (ref 0–2.3)
BASO+EOS+MONOS NFR BLD AUTO: 13 % (ref 0.1–17)
DIFFERENTIAL METHOD BLD: ABNORMAL
ERYTHROCYTE [DISTWIDTH] IN BLOOD BY AUTOMATED COUNT: 12.6 % (ref 11.5–14.5)
HCT VFR BLD AUTO: 45.3 % (ref 36–48)
HGB BLD-MCNC: 15.4 G/DL (ref 12–16)
LYMPHOCYTES # BLD: 1.2 K/UL (ref 1.1–5.9)
LYMPHOCYTES NFR BLD: 31 % (ref 14–44)
MCH RBC QN AUTO: 33.4 PG (ref 25–35)
MCHC RBC AUTO-ENTMCNC: 34 G/DL (ref 31–37)
MCV RBC AUTO: 98.3 FL (ref 78–102)
NEUTS SEG # BLD: 2.1 K/UL (ref 1.8–9.5)
NEUTS SEG NFR BLD: 56 % (ref 40–70)
PLATELET # BLD AUTO: 177 K/UL (ref 140–440)
RBC # BLD AUTO: 4.61 M/UL (ref 4.1–5.1)
WBC # BLD AUTO: 3.8 K/UL (ref 4.5–13)

## 2021-06-25 PROCEDURE — 74011250636 HC RX REV CODE- 250/636: Performed by: INTERNAL MEDICINE

## 2021-06-25 PROCEDURE — 99195 PHLEBOTOMY: CPT

## 2021-06-25 PROCEDURE — 36415 COLL VENOUS BLD VENIPUNCTURE: CPT

## 2021-06-25 PROCEDURE — 85025 COMPLETE CBC W/AUTO DIFF WBC: CPT

## 2021-06-25 RX ORDER — SODIUM CHLORIDE 9 MG/ML
500 INJECTION, SOLUTION INTRAVENOUS CONTINUOUS
Status: DISCONTINUED | OUTPATIENT
Start: 2021-06-25 | End: 2021-06-26 | Stop reason: HOSPADM

## 2021-06-25 RX ADMIN — SODIUM CHLORIDE 250 ML: 9 INJECTION, SOLUTION INTRAVENOUS at 11:04

## 2021-06-25 RX ADMIN — SODIUM CHLORIDE 250 ML: 9 INJECTION, SOLUTION INTRAVENOUS at 11:03

## 2021-07-02 ENCOUNTER — HOSPITAL ENCOUNTER (OUTPATIENT)
Dept: INFUSION THERAPY | Age: 41
Discharge: HOME | End: 2021-07-02
Payer: COMMERCIAL

## 2021-07-02 VITALS
TEMPERATURE: 97 F | HEART RATE: 97 BPM | RESPIRATION RATE: 16 BRPM | DIASTOLIC BLOOD PRESSURE: 90 MMHG | OXYGEN SATURATION: 98 % | SYSTOLIC BLOOD PRESSURE: 139 MMHG

## 2021-07-02 LAB
BASO+EOS+MONOS # BLD AUTO: 0.9 K/UL (ref 0–2.3)
BASO+EOS+MONOS NFR BLD AUTO: 16 % (ref 0.1–17)
DIFFERENTIAL METHOD BLD: NORMAL
ERYTHROCYTE [DISTWIDTH] IN BLOOD BY AUTOMATED COUNT: 12.6 % (ref 11.5–14.5)
HCT VFR BLD AUTO: 41.7 % (ref 36–48)
HGB BLD-MCNC: 14 G/DL (ref 12–16)
LYMPHOCYTES # BLD: 1.7 K/UL (ref 1.1–5.9)
LYMPHOCYTES NFR BLD: 31 % (ref 14–44)
MCH RBC QN AUTO: 32.7 PG (ref 25–35)
MCHC RBC AUTO-ENTMCNC: 33.6 G/DL (ref 31–37)
MCV RBC AUTO: 97.4 FL (ref 78–102)
NEUTS SEG # BLD: 2.8 K/UL (ref 1.8–9.5)
NEUTS SEG NFR BLD: 54 % (ref 40–70)
PLATELET # BLD AUTO: 209 K/UL (ref 140–440)
RBC # BLD AUTO: 4.28 M/UL (ref 4.1–5.1)
WBC # BLD AUTO: 5.4 K/UL (ref 4.5–13)

## 2021-07-02 PROCEDURE — 85025 COMPLETE CBC W/AUTO DIFF WBC: CPT

## 2021-07-02 PROCEDURE — 36415 COLL VENOUS BLD VENIPUNCTURE: CPT

## 2021-07-02 NOTE — PROGRESS NOTES
[facility] Progress Note    Date: 2021    Name: Anupam Jorge    MRN: [id number]         : 1980    Therapeutic Phlebotomy (Weekly status)    Ms. Rose Hsieh arrived to [facility] at [phone], ambulatory. Ms. Rose Hsieh was assessed and education was provided. Ms. Olvera Seats vitals were reviewed. Visit Vitals  BP (!) 139/90 (BP 1 Location: Left upper arm, BP Patient Position: Sitting)   Pulse 97   Temp 97 °F (36.1 °C)   Resp 16   SpO2 98%       Blood drawn for labs via right antecubital; patent and no redness venipuncture x1 attempt for CBC as ordered. Lab results were obtained and reviewed. Recent Results (from the past 12 hour(s))   CBC WITH 3 PART DIFF    Collection Time: 21  2:15 PM   Result Value Ref Range    WBC 5.4 4.5 - 13.0 K/uL    RBC 4.28 4.10 - 5.10 M/uL    HGB 14.0 12.0 - 16 g/dL    HCT 41.7 36 - 48 %    MCV 97.4 78 - 102 FL    MCH 32.7 25.0 - 35.0 PG    MCHC 33.6 31 - 37 g/dL    RDW 12.6 11.5 - 14.5 %    PLATELET 065 117 - 729 K/uL    NEUTROPHILS 54 40 - 70 %    MIXED CELLS 16 0.1 - 17 %    LYMPHOCYTES 31 14 - 44 %    ABS. NEUTROPHILS 2.8 1.8 - 9.5 K/UL    ABS. MIXED CELLS 0.9 0.0 - 2.3 K/uL    ABS. LYMPHOCYTES 1.7 1.1 - 5.9 K/UL    DF AUTOMATED         Hgb 14.0 and Hct 41.7. Under 42.0 today, Therapeutic Phlebotomy HELD today. Ms. Rose Hsieh was discharged from [place] [address] in stable condition at 1130. She is to return on at 2021 at 1400 for her next Therapeutic phlebotomy 6 week status appointment.     Abby Nieto RN  2021

## 2021-08-13 ENCOUNTER — APPOINTMENT (OUTPATIENT)
Dept: INFUSION THERAPY | Age: 41
End: 2021-08-13

## 2021-08-25 ENCOUNTER — APPOINTMENT (OUTPATIENT)
Dept: INFUSION THERAPY | Age: 41
End: 2021-08-25

## 2021-09-15 ENCOUNTER — APPOINTMENT (OUTPATIENT)
Dept: INFUSION THERAPY | Age: 41
End: 2021-09-15

## 2021-09-29 ENCOUNTER — APPOINTMENT (OUTPATIENT)
Dept: INFUSION THERAPY | Age: 41
End: 2021-09-29

## 2022-03-18 PROBLEM — E55.9 VITAMIN D DEFICIENCY: Status: ACTIVE | Noted: 2017-06-13

## 2022-03-19 PROBLEM — F41.9 ANXIETY: Status: ACTIVE | Noted: 2017-06-13

## 2022-03-28 ENCOUNTER — TRANSCRIBE ORDER (OUTPATIENT)
Dept: SCHEDULING | Age: 42
End: 2022-03-28

## 2022-03-28 DIAGNOSIS — Z12.39 BREAST SCREENING: Primary | ICD-10-CM

## 2022-10-11 ENCOUNTER — OFFICE VISIT (OUTPATIENT)
Dept: CARDIOLOGY CLINIC | Age: 42
End: 2022-10-11
Payer: COMMERCIAL

## 2022-10-11 VITALS
OXYGEN SATURATION: 99 % | HEIGHT: 68 IN | HEART RATE: 93 BPM | SYSTOLIC BLOOD PRESSURE: 138 MMHG | WEIGHT: 232 LBS | DIASTOLIC BLOOD PRESSURE: 90 MMHG | BODY MASS INDEX: 35.16 KG/M2

## 2022-10-11 DIAGNOSIS — Z72.0 TOBACCO ABUSE: ICD-10-CM

## 2022-10-11 DIAGNOSIS — M79.89 LEG SWELLING: ICD-10-CM

## 2022-10-11 DIAGNOSIS — I10 ESSENTIAL HYPERTENSION: Primary | ICD-10-CM

## 2022-10-11 DIAGNOSIS — R60.9 EDEMA, UNSPECIFIED TYPE: ICD-10-CM

## 2022-10-11 PROCEDURE — 93000 ELECTROCARDIOGRAM COMPLETE: CPT | Performed by: INTERNAL MEDICINE

## 2022-10-11 PROCEDURE — 99204 OFFICE O/P NEW MOD 45 MIN: CPT | Performed by: INTERNAL MEDICINE

## 2022-10-11 RX ORDER — BUSPIRONE HYDROCHLORIDE 15 MG/1
15 TABLET ORAL DAILY
COMMUNITY

## 2022-10-11 RX ORDER — VALSARTAN AND HYDROCHLOROTHIAZIDE 160; 12.5 MG/1; MG/1
1 TABLET, FILM COATED ORAL EVERY MORNING
Qty: 30 TABLET | Refills: 6 | Status: SHIPPED | OUTPATIENT
Start: 2022-10-11

## 2022-10-11 RX ORDER — LANOLIN ALCOHOL/MO/W.PET/CERES
1000 CREAM (GRAM) TOPICAL DAILY
COMMUNITY

## 2022-10-11 RX ORDER — LAMOTRIGINE 200 MG/1
200 TABLET, EXTENDED RELEASE ORAL DAILY
COMMUNITY
Start: 2022-08-15

## 2022-10-11 RX ORDER — METOPROLOL SUCCINATE 50 MG/1
50 TABLET, EXTENDED RELEASE ORAL DAILY
COMMUNITY
Start: 2022-10-10

## 2022-10-11 RX ORDER — LAMOTRIGINE 25 MG/1
25 TABLET, EXTENDED RELEASE ORAL DAILY
COMMUNITY
Start: 2022-07-20

## 2022-10-11 NOTE — PROGRESS NOTES
Meet Negro presents today for   Chief Complaint   Patient presents with    New Patient     Ref by Dr. Gabriela Parada for HTN, Edema, Possibly Diastolic Dysfunction HF       Meet Negro preferred language for health care discussion is english/other. Is someone accompanying this pt? yes    Is the patient using any DME equipment during [address]? no    Depression Screening:  3 most recent PHQ Screens 10/11/2022   Little interest or pleasure in doing things Not at all   Feeling down, depressed, irritable, or hopeless Not at all   Total Score PHQ 2 0   Trouble falling or staying asleep, or sleeping too much -   Feeling tired or having little energy -   Poor appetite, weight loss, or overeating -   Feeling bad about yourself - or that you are a failure or have let yourself or your family down -   Trouble concentrating on things such as school, work, reading, or watching TV -   Moving or speaking so slowly that other people could have noticed; or the opposite being so fidgety that others notice -   Thoughts of being better off dead, or hurting yourself in some way -   PHQ 9 Score -   How difficult have these problems made it for you to do your work, take care of your home and get along with others -       Learning Assessment:  Learning Assessment 10/11/2022   PRIMARY LEARNER Patient   HIGHEST LEVEL OF EDUCATION - PRIMARY LEARNER  -   BARRIERS PRIMARY LEARNER -   CO-LEARNER CAREGIVER -   PRIMARY LANGUAGE ENGLISH   LEARNER PREFERENCE PRIMARY DEMONSTRATION   ANSWERED BY patient   RELATIONSHIP SELF       Abuse Screening:  Abuse Screening Questionnaire 10/11/2022   Do you ever feel afraid of your partner? N   Are you in a relationship with someone who physically or mentally threatens you? N   Is it safe for you to go home? Y       Fall Risk  Fall Risk Assessment, last 12 mths 2/19/2021   Able to walk? Yes   Fall in past 12 months?  0           Pt currently taking Anticoagulant therapy? no    Pt currently taking Antiplatelet therapy ? no      Coordination of Care:  1. Have you been to the ER, urgent care clinic since your last visit? Hospitalized since your last visit? yes    2. Have you seen or consulted any other health care providers outside of the [address] [place] [address] since your last visit? Include any pap smears or colon screening.  no

## 2022-10-11 NOTE — PROGRESS NOTES
HISTORY OF PRESENT ILLNESS  Fiona Dye is a 38 y.o. female. New Patient  Pertinent negatives include no chest pain, no abdominal pain, no headaches and no shortness of breath. Patient presents for a new office visit. She was referred here by her PCP for evaluation of fairly new onset high blood pressure and increased leg swelling over the past few months. Patient states that she has gained approximately 50 pounds in weight over the past 12 to 18 months. She recently noticed increased leg swelling over the past 2 to 3 months along with elevated blood pressure. She was initially given HCTZ at an urgent care, however this was switched over to furosemide for several days by her PCP which did improve some of her leg swelling. She was then started on a low-dose of metoprolol XL primarily for blood pressure control. Since starting this medication, she has not noted much improvement in her blood pressure. Overall her leg swelling is better than it was last month. She denies any orthopnea or PND. No increase shortness of breath, no new chest pain, heart palpitations, dizzy spells, nor syncope. Past Medical History:   Diagnosis Date    Anxiety     Discoid lupus     skin lupus    Hypertension     during pregnancy/6 months after    IBS (irritable bowel syndrome) 2016    Menorrhagia     hysterectomy    Polycythemia      Current Outpatient Medications   Medication Sig Dispense Refill    busPIRone (BUSPAR) 15 mg tablet Take 15 mg by mouth daily. lamoTRIgine (LaMICtal XR) 200 mg tr24 ER tablet Take 200 mg by mouth daily. lamoTRIgine (LaMICtal XR) 25 mg tr24 ER tablet Take 25 mg by mouth daily. metoprolol succinate (TOPROL-XL) 50 mg XL tablet Take 50 mg by mouth daily. cyanocobalamin 1,000 mcg tablet Take 1,000 mcg by mouth daily. valsartan-hydroCHLOROthiazide (DIOVAN-HCT) 160-12.5 mg per tablet Take 1 Tablet by mouth Every morning.  30 Tablet 6    olodateroL (STRIVERDI) 2.5 mcg/actuation mist Take 2 Puffs by inhalation every twenty-four (24) hours. 1 Inhaler 11    levocetirizine (XYZAL) 5 mg tablet Take 5 mg by mouth daily as needed for Allergies. albuterol (PROVENTIL HFA, VENTOLIN HFA, PROAIR HFA) 90 mcg/actuation inhaler Take 2 Puffs by inhalation every four (4) hours as needed for Wheezing. 1 Inhaler 2    ALPRAZolam (XANAX) 0.5 mg tablet Take 1/2 to 1 tab daily as needed for anxiety MDD: 1 tab 30 Tab 0     Allergies   Allergen Reactions    Cat Dander Shortness of Breath and Runny Nose    Lexapro [Escitalopram Oxalate] Other (comments)     dilated pupils and blurry vision    Dog Dander Shortness of Breath and Runny Nose        Social History     Tobacco Use    Smoking status: Former     Packs/day: 0.50     Years: 15.00     Pack years: 7.50     Types: Cigarettes    Smokeless tobacco: Former     Quit date: 4/1/2019    Tobacco comments:     1998   Vaping Use    Vaping Use: Never used   Substance Use Topics    Alcohol use: Yes     Alcohol/week: 0.0 standard drinks     Types: 1 - 4 Glasses of wine per week     Comment: occ    Drug use: No     Family History   Problem Relation Age of Onset    Alcohol abuse Mother     Drug Abuse Mother     Arthritis-rheumatoid Mother     Hypertension Father     Hypertension Sister     High Cholesterol Sister     Dementia Sister     Stroke Sister     Arthritis-rheumatoid Maternal Grandmother     Alcohol abuse Maternal Grandfather     Arthritis-rheumatoid Maternal Grandfather     Other Maternal Grandfather         ruptured bowel    Stroke Sister          Review of Systems   Constitutional:  Negative for chills, fever and weight loss. Weight gain   HENT:  Negative for nosebleeds. Eyes:  Negative for blurred vision and double vision. Respiratory:  Negative for cough, shortness of breath and wheezing. Cardiovascular:  Positive for leg swelling. Negative for chest pain, palpitations, orthopnea, claudication and PND.    Gastrointestinal: Negative for abdominal pain, heartburn, nausea and vomiting. Genitourinary:  Negative for dysuria and hematuria. Musculoskeletal:  Negative for falls and myalgias. Skin:  Negative for rash. Neurological:  Negative for dizziness, focal weakness and headaches. Endo/Heme/Allergies:  Does not bruise/bleed easily. Psychiatric/Behavioral:  Negative for substance abuse. Visit Vitals  BP (!) 138/90 (BP 1 Location: Left upper arm, BP Patient Position: Sitting, BP Cuff Size: Large adult)   Pulse 93   Ht 5' 8\" (1.727 m)   Wt 105.2 kg (232 lb)   LMP 10/29/2014   SpO2 99%   BMI 35.28 kg/m²       Physical Exam  Constitutional:       Appearance: She is well-developed. HENT:      Head: Normocephalic and atraumatic. Eyes:      Conjunctiva/sclera: Conjunctivae normal.   Neck:      Vascular: No carotid bruit or JVD. Cardiovascular:      Rate and Rhythm: Normal rate and regular rhythm. Pulses: Normal pulses. Heart sounds: S1 normal and S2 normal. No murmur heard. No gallop. Pulmonary:      Effort: Pulmonary effort is normal.      Breath sounds: Normal breath sounds. No wheezing or rales. Abdominal:      General: Bowel sounds are normal.      Palpations: Abdomen is soft. Tenderness: There is no abdominal tenderness. Musculoskeletal:         General: Swelling (Trace bilateral lower extremity) present. Cervical back: Neck supple. Skin:     General: Skin is warm and dry. Neurological:      General: No focal deficit present. Mental Status: She is alert and oriented to person, place, and time. Psychiatric:         Mood and Affect: Mood normal.     EKG: Normal sinus rhythm, normal axis, normal QTc interval, no ST/T wave abnormalities concerning for ischemia. ASSESSMENT and PLAN  Encounter Diagnoses   Name Primary? Essential hypertension Yes    Edema, unspecified type     Leg swelling     Tobacco abuse      Essential hypertension.   Patient's blood pressure was recently found to be extremely elevated at home. She is currently only taking metoprolol XL 50 mg daily. Her blood pressure remains elevated on this regimen. I recommended stopping the beta-blocker altogether and starting on valsartan/HCTZ. I have also advised her to lose much weight as possible adhere to a strict 2 g sodium diet. Lower extremity swelling. This may all be due to dependent edema, but I would like to evaluate for heart failure with an echocardiogram and also assess her PA pressures. Obesity. Patient reports a 50 pound weight gain over the past 12 to 18 months. She was encouraged to try and lose much weight as possible lifestyle modification. History of tobacco use. Patient quit smoking 3 years ago. She was congratulated and encouraged to remain tobacco free. Follow-up in 3 to 4 months, sooner if needed.

## 2022-10-11 NOTE — PATIENT INSTRUCTIONS
Follow up with Dr. Saqib Colón in 3-4 months  Stop Metoprolol  Start Valsartan/ HCTZ 160/12.5mg every morning  ECHO

## 2022-11-10 ENCOUNTER — TELEPHONE (OUTPATIENT)
Dept: CARDIOLOGY CLINIC | Age: 42
End: 2022-11-10

## 2022-11-10 NOTE — TELEPHONE ENCOUNTER
Patient made aware of echo results and Dr. Joanna Saleem remarks. No questions or concerns at present.

## 2022-11-10 NOTE — TELEPHONE ENCOUNTER
----- Message from Jude Schneider MD sent at 11/9/2022  4:39 PM EST -----  Please let the patient know that her echocardiogram was normal.  ----- Message -----  From: Conchita Mai LPN  Sent: 40/4/8512   1:59 PM EST  To: Jude Schneider MD    Per your note - Lower extremity swelling. This may all be due to dependent edema, but I would like to evaluate for heart failure with an echocardiogram and also assess her PA pressures.

## 2022-12-30 ENCOUNTER — DOCUMENTATION ONLY (OUTPATIENT)
Dept: PULMONOLOGY | Age: 42
End: 2022-12-30

## 2023-01-18 ENCOUNTER — TRANSCRIBE ORDER (OUTPATIENT)
Dept: SCHEDULING | Age: 43
End: 2023-01-18

## 2023-01-18 DIAGNOSIS — R92.8 ABNORMAL MAMMOGRAM: Primary | ICD-10-CM

## 2023-01-28 ENCOUNTER — TRANSCRIBE ORDERS (OUTPATIENT)
Facility: HOSPITAL | Age: 43
End: 2023-01-28

## 2023-01-28 DIAGNOSIS — R92.8 ABNORMAL MAMMOGRAM: Primary | ICD-10-CM

## 2023-01-30 DIAGNOSIS — Z12.39 BREAST SCREENING: Primary | ICD-10-CM

## 2023-02-01 DIAGNOSIS — R92.8 ABNORMAL MAMMOGRAM: Primary | ICD-10-CM

## 2023-02-02 ENCOUNTER — HOSPITAL ENCOUNTER (EMERGENCY)
Age: 43
Discharge: HOME | End: 2023-02-02
Attending: EMERGENCY MEDICINE
Payer: COMMERCIAL

## 2023-02-02 ENCOUNTER — APPOINTMENT (OUTPATIENT)
Dept: CT IMAGING | Age: 43
End: 2023-02-02
Attending: PHYSICIAN ASSISTANT
Payer: COMMERCIAL

## 2023-02-02 VITALS
TEMPERATURE: 97.4 F | RESPIRATION RATE: 18 BRPM | BODY MASS INDEX: 36.1 KG/M2 | HEIGHT: 67 IN | SYSTOLIC BLOOD PRESSURE: 134 MMHG | HEART RATE: 85 BPM | WEIGHT: 230 LBS | OXYGEN SATURATION: 99 % | DIASTOLIC BLOOD PRESSURE: 90 MMHG

## 2023-02-02 DIAGNOSIS — N83.201 OVARIAN CYST, RIGHT: Primary | ICD-10-CM

## 2023-02-02 LAB
ALBUMIN SERPL-MCNC: 3.9 G/DL (ref 3.4–5)
ALBUMIN/GLOB SERPL: 1.1 (ref 0.8–1.7)
ALP SERPL-CCNC: 94 U/L (ref 45–117)
ALT SERPL-CCNC: 32 U/L (ref 13–56)
ANION GAP SERPL CALC-SCNC: 4 MMOL/L (ref 3–18)
APPEARANCE UR: CLEAR
AST SERPL-CCNC: 26 U/L (ref 10–38)
BASOPHILS # BLD: 0.1 K/UL (ref 0–0.1)
BASOPHILS NFR BLD: 1 % (ref 0–2)
BILIRUB SERPL-MCNC: 0.5 MG/DL (ref 0.2–1)
BILIRUB UR QL: NEGATIVE
BUN SERPL-MCNC: 8 MG/DL (ref 7–18)
BUN/CREAT SERPL: 11 (ref 12–20)
CALCIUM SERPL-MCNC: 9.6 MG/DL (ref 8.5–10.1)
CHLORIDE SERPL-SCNC: 101 MMOL/L (ref 100–111)
CO2 SERPL-SCNC: 28 MMOL/L (ref 21–32)
COLOR UR: YELLOW
CREAT SERPL-MCNC: 0.76 MG/DL (ref 0.6–1.3)
DIFFERENTIAL METHOD BLD: ABNORMAL
EOSINOPHIL # BLD: 0.2 K/UL (ref 0–0.4)
EOSINOPHIL NFR BLD: 2 % (ref 0–5)
ERYTHROCYTE [DISTWIDTH] IN BLOOD BY AUTOMATED COUNT: 12.8 % (ref 11.6–14.5)
GLOBULIN SER CALC-MCNC: 3.6 G/DL (ref 2–4)
GLUCOSE SERPL-MCNC: 81 MG/DL (ref 74–99)
GLUCOSE UR STRIP.AUTO-MCNC: NEGATIVE MG/DL
HCT VFR BLD AUTO: 47.8 % (ref 35–45)
HGB BLD-MCNC: 16.5 G/DL (ref 12–16)
HGB UR QL STRIP: NEGATIVE
IMM GRANULOCYTES # BLD AUTO: 0 K/UL (ref 0–0.04)
IMM GRANULOCYTES NFR BLD AUTO: 0 % (ref 0–0.5)
KETONES UR QL STRIP.AUTO: NEGATIVE MG/DL
LEUKOCYTE ESTERASE UR QL STRIP.AUTO: NEGATIVE
LIPASE SERPL-CCNC: 325 U/L (ref 73–393)
LYMPHOCYTES # BLD: 1.7 K/UL (ref 0.9–3.6)
LYMPHOCYTES NFR BLD: 22 % (ref 21–52)
MCH RBC QN AUTO: 33.8 PG (ref 24–34)
MCHC RBC AUTO-ENTMCNC: 34.5 G/DL (ref 31–37)
MCV RBC AUTO: 98 FL (ref 78–100)
MONOCYTES # BLD: 0.5 K/UL (ref 0.05–1.2)
MONOCYTES NFR BLD: 6 % (ref 3–10)
NEUTS SEG # BLD: 5.4 K/UL (ref 1.8–8)
NEUTS SEG NFR BLD: 68 % (ref 40–73)
NITRITE UR QL STRIP.AUTO: NEGATIVE
NRBC # BLD: 0 K/UL (ref 0–0.01)
NRBC BLD-RTO: 0 PER 100 WBC
PH UR STRIP: 6.5 (ref 5–8)
PLATELET # BLD AUTO: 211 K/UL (ref 135–420)
PMV BLD AUTO: 9.4 FL (ref 9.2–11.8)
POTASSIUM SERPL-SCNC: 4.1 MMOL/L (ref 3.5–5.5)
PROT SERPL-MCNC: 7.5 G/DL (ref 6.4–8.2)
PROT UR STRIP-MCNC: NEGATIVE MG/DL
RBC # BLD AUTO: 4.88 M/UL (ref 4.2–5.3)
SODIUM SERPL-SCNC: 133 MMOL/L (ref 136–145)
SP GR UR REFRACTOMETRY: <1.005 (ref 1–1.03)
UROBILINOGEN UR QL STRIP.AUTO: 0.2 EU/DL (ref 0.2–1)
WBC # BLD AUTO: 7.9 K/UL (ref 4.6–13.2)

## 2023-02-02 PROCEDURE — 80053 COMPREHEN METABOLIC PANEL: CPT

## 2023-02-02 PROCEDURE — 74011250637 HC RX REV CODE- 250/637: Performed by: PHYSICIAN ASSISTANT

## 2023-02-02 PROCEDURE — 81003 URINALYSIS AUTO W/O SCOPE: CPT

## 2023-02-02 PROCEDURE — 85025 COMPLETE CBC W/AUTO DIFF WBC: CPT

## 2023-02-02 PROCEDURE — 99284 EMERGENCY DEPT VISIT MOD MDM: CPT

## 2023-02-02 PROCEDURE — 74176 CT ABD & PELVIS W/O CONTRAST: CPT

## 2023-02-02 PROCEDURE — 83690 ASSAY OF LIPASE: CPT

## 2023-02-02 RX ORDER — ONDANSETRON 8 MG/1
8 TABLET, ORALLY DISINTEGRATING ORAL
Status: COMPLETED | OUTPATIENT
Start: 2023-02-02 | End: 2023-02-02

## 2023-02-02 RX ADMIN — ONDANSETRON 8 MG: 8 TABLET, ORALLY DISINTEGRATING ORAL at 16:34

## 2023-02-02 NOTE — ED TRIAGE NOTES
Pt is ambulatory to triage, presents with complaints of RLQ pain that radiates to R back. Started approx 1300 today. + nausea. Denies vomiting, constipation, diarrhea, dysuria, or any other symptoms.

## 2023-02-02 NOTE — ED PROVIDER NOTES
[facility] (DP/SNF)  Emergency Department Treatment Report        Patient: Liliana Garcia Age: 38 y.o. Sex: female    YOB: 1980 Admit Date: 2023 PCP: Frankie Veronica MD   MRN: [id number]  CSN: [id number]  Attending: Raheem Ding MD      Room: HPIT/PIT Time Dictated: 4:24 PM PA-C: MARKUS Forman     Chief Complaint   Abdominal pain, back pain    History of Present Illness   4:24 PM   38 y.o. female presents to the ED C/O RT abdomina pain and RT back pain that started around noon today. Patient reports nausea with it also. Patient has not taken anything for the symptoms. No fever, chills, vomiting, diarrhea, constipation, dysuria, frequency, urgency, hematuria, history of gall bladder disease. She has had a hysterectomy and  but no other abdominal surgeries. Review of Systems   Review of Systems   Constitutional:  Negative for appetite change, chills and fever. HENT:  Negative for congestion, ear pain and sore throat. Eyes:  Negative for discharge and redness. Respiratory:  Negative for cough, chest tightness, shortness of breath and wheezing. Cardiovascular:  Negative for chest pain. Gastrointestinal:  Positive for abdominal pain and nausea. Negative for constipation, diarrhea and vomiting. Endocrine: Negative for polyuria. Genitourinary:  Negative for dysuria, frequency, hematuria, pelvic pain, urgency, vaginal bleeding, vaginal discharge and vaginal pain. Musculoskeletal:  Positive for back pain. Negative for joint swelling and neck pain. Skin:  Negative for rash and wound. Allergic/Immunologic: Negative for immunocompromised state. Neurological:  Negative for dizziness, syncope, light-headedness, numbness and headaches. Hematological:  Negative for adenopathy. Psychiatric/Behavioral:  Negative for agitation and confusion. The patient is not nervous/anxious.     Past Medical/Surgical History     Past Medical History:   Diagnosis Date Anxiety     Discoid lupus     skin lupus    Hypertension     during pregnancy/6 months after    IBS (irritable bowel syndrome) 2016    Menorrhagia     hysterectomy    Polycythemia      Past Surgical History:   Procedure Laterality Date    DELIVERY       HX DILATION AND CURETTAGE      HX GYN      laproscopy    HX HYSTERECTOMY         Social History     Social History     Socioeconomic History    Marital status:      Spouse name: Not on file    Number of children: Not on file    Years of education: Not on file    Highest education level: Not on file   Occupational History    Occupation:    Tobacco Use    Smoking status: Former     Packs/day: 1.00     Years: 15.00     Pack years: 15.00     Types: Cigarettes    Smokeless tobacco: Former     Quit date: 2019    Tobacco comments:        Vaping Use    Vaping Use: Never used   Substance and Sexual Activity    Alcohol use: Yes     Types: 1 - 4 Glasses of wine per week     Comment: daily    Drug use: No    Sexual activity: Yes     Partners: Male     Birth control/protection: None   Other Topics Concern    Not on file   Social History Narrative    Not on file     Social Determinants of Health     Financial Resource Strain: Not on file   Food Insecurity: Not on file   Transportation Needs: Not on file   Physical Activity: Not on file   Stress: Not on file   Social Connections: Not on file   Intimate Partner Violence: Not on file   Housing Stability: Not on file       Family History     Family History   Problem Relation Age of Onset    Alcohol abuse Mother     Drug Abuse Mother     Arthritis-rheumatoid Mother     Hypertension Father     Hypertension Sister     High Cholesterol Sister     Dementia Sister     Stroke Sister     Arthritis-rheumatoid Maternal Grandmother     Alcohol abuse Maternal Grandfather     Arthritis-rheumatoid Maternal Grandfather     Other Maternal Grandfather         ruptured bowel    Stroke Sister        Current Medications     Cannot display prior to admission medications because the patient has not been admitted in this contact. Allergies     Allergies   Allergen Reactions    Cat Dander Shortness of Breath and Runny Nose    Lexapro [Escitalopram Oxalate] Other (comments)     dilated pupils and blurry vision    Dog Dander Shortness of Breath and Runny Nose       Physical Exam   Patient Vitals for the past 8 hrs:   Temp Pulse Resp BP SpO2   02/02/23 1603 97.4 °F (36.3 °C) 85 18 (!) 134/90 99 %       Physical Exam  Vitals and nursing note reviewed. Constitutional:       General: She is not in acute distress. Appearance: She is well-developed. She is not diaphoretic. HENT:      Head: Normocephalic and atraumatic. Right Ear: External ear normal.      Left Ear: External ear normal.      Nose: Nose normal.      Mouth/Throat:      Pharynx: No oropharyngeal exudate. Eyes:      General:         Right eye: No discharge. Left eye: No discharge. Conjunctiva/sclera: Conjunctivae normal.   Cardiovascular:      Rate and Rhythm: Normal rate and regular rhythm. Heart sounds: Normal heart sounds. No murmur heard. No friction rub. No gallop. Pulmonary:      Effort: Pulmonary effort is normal. No respiratory distress. Breath sounds: Normal breath sounds. No wheezing or rales. Chest:      Chest wall: No tenderness. Abdominal:      General: Bowel sounds are normal. There is no distension. Palpations: Abdomen is soft. There is no shifting dullness, fluid wave, hepatomegaly, splenomegaly, mass or pulsatile mass. Tenderness: There is no abdominal tenderness. There is no right CVA tenderness, left CVA tenderness or rebound. Negative signs include Jeff's sign, Rovsing's sign, McBurney's sign, psoas sign and obturator sign. Musculoskeletal:         General: No tenderness or deformity. Normal range of motion. Cervical back: Normal range of motion and neck supple. Lymphadenopathy:      Cervical: No cervical adenopathy. Skin:     General: Skin is warm and dry. Findings: No rash. Neurological:      Mental Status: She is alert and oriented to person, place, and time. Psychiatric:         Behavior: Behavior normal.         Thought Content: Thought content normal.         Judgment: Judgment normal.       Diagnostic Studies   RESULTS:    CT ABD PELV WO CONT   Final Result      No clearly acute findings. Right adnexal lobulated cyst like structure as described, difficult to separate   from small bowel, but probably benign functional ovarian cyst. Consider pelvic   ultrasound if there is concern for gynecologic pathology.           Labs Reviewed   URINALYSIS W/ RFLX MICROSCOPIC - Abnormal; Notable for the following components:       Result Value    Specific gravity <1.005 (*)     All other components within normal limits   CBC WITH AUTOMATED DIFF - Abnormal; Notable for the following components:    HGB 16.5 (*)     HCT 47.8 (*)     All other components within normal limits   METABOLIC PANEL, COMPREHENSIVE - Abnormal; Notable for the following components:    Sodium 133 (*)     BUN/Creatinine ratio 11 (*)     All other components within normal limits   LIPASE       Recent Results (from the past 12 hour(s))   URINALYSIS W/ RFLX MICROSCOPIC    Collection Time: 02/02/23  4:08 PM   Result Value Ref Range    Color YELLOW      Appearance CLEAR      Specific gravity <1.005 (L) 1.005 - 1.030    pH (UA) 6.5 5.0 - 8.0      Protein Negative NEG mg/dL    Glucose Negative NEG mg/dL    Ketone Negative NEG mg/dL    Bilirubin Negative NEG      Blood Negative NEG      Urobilinogen 0.2 0.2 - 1.0 EU/dL    Nitrites Negative NEG      Leukocyte Esterase Negative NEG     CBC WITH AUTOMATED DIFF    Collection Time: 02/02/23  5:49 PM   Result Value Ref Range    WBC 7.9 4.6 - 13.2 K/uL    RBC 4.88 4.20 - 5.30 M/uL    HGB 16.5 (H) 12.0 - 16.0 g/dL    HCT 47.8 (H) 35.0 - 45.0 %    MCV 98.0 78.0 - 100.0 FL    MCH 33.8 24.0 - 34.0 PG    MCHC 34.5 31.0 - 37.0 g/dL    RDW 12.8 11.6 - 14.5 %    PLATELET 616 578 - 484 K/uL    MPV 9.4 9.2 - 11.8 FL    NRBC 0.0 0  WBC    ABSOLUTE NRBC 0.00 0.00 - 0.01 K/uL    NEUTROPHILS 68 40 - 73 %    LYMPHOCYTES 22 21 - 52 %    MONOCYTES 6 3 - 10 %    EOSINOPHILS 2 0 - 5 %    BASOPHILS 1 0 - 2 %    IMMATURE GRANULOCYTES 0 0.0 - 0.5 %    ABS. NEUTROPHILS 5.4 1.8 - 8.0 K/UL    ABS. LYMPHOCYTES 1.7 0.9 - 3.6 K/UL    ABS. MONOCYTES 0.5 0.05 - 1.2 K/UL    ABS. EOSINOPHILS 0.2 0.0 - 0.4 K/UL    ABS. BASOPHILS 0.1 0.0 - 0.1 K/UL    ABS. IMM. GRANS. 0.0 0.00 - 0.04 K/UL    DF AUTOMATED     METABOLIC PANEL, COMPREHENSIVE    Collection Time: 02/02/23  5:49 PM   Result Value Ref Range    Sodium 133 (L) 136 - 145 mmol/L    Potassium 4.1 3.5 - 5.5 mmol/L    Chloride 101 100 - 111 mmol/L    CO2 28 21 - 32 mmol/L    Anion gap 4 3.0 - 18 mmol/L    Glucose 81 74 - 99 mg/dL    BUN 8 7.0 - 18 MG/DL    Creatinine 0.76 0.6 - 1.3 MG/DL    BUN/Creatinine ratio 11 (L) 12 - 20      eGFR >60 >60 ml/min/1.73m2    Calcium 9.6 8.5 - 10.1 MG/DL    Bilirubin, total 0.5 0.2 - 1.0 MG/DL    ALT (SGPT) 32 13 - 56 U/L    AST (SGOT) 26 10 - 38 U/L    Alk. phosphatase 94 45 - 117 U/L    Protein, total 7.5 6.4 - 8.2 g/dL    Albumin 3.9 3.4 - 5.0 g/dL    Globulin 3.6 2.0 - 4.0 g/dL    A-G Ratio 1.1 0.8 - 1.7     LIPASE    Collection Time: 02/02/23  5:49 PM   Result Value Ref Range    Lipase 325 73 - 393 U/L       MEDICATIONS GIVEN:  Medications   ondansetron (ZOFRAN ODT) tablet 8 mg (8 mg Oral Given 2/2/23 2152)        Procedures  Procedures    Impression / ED Course / Medical Decision Making   MDM  Number of Diagnoses or Management Options  Ovarian cyst, right: minor  Diagnosis management comments: DDx: Abdominal pain, cholecystitis, cholelithiasis, appendicitis, nausea, UTI cystitis, pyelonephritis, renal calculi. Impression: RT ovarian cyst.    Management Plan: Evaluate and treat Abdominal pain.   Obtain UA, labs, and CT Abd/pelvis. Patient was in agreement with discharge plan and discharged in good condition. Amount and/or Complexity of Data Reviewed  Clinical lab tests: ordered and reviewed  Tests in the radiology section of CPT®: ordered and reviewed    Risk of Complications, Morbidity, and/or Mortality  Presenting problems: moderate  Diagnostic procedures: moderate  Management options: low    Patient Progress  Patient progress: stable      PROGRESS NOTE:   4:24 PM   Initial assessment completed. DISCHARGE NOTE:  6:23 PM   Jazmin Cortes's  results have been reviewed with her. She has been counseled regarding her diagnosis, treatment, and plan. She verbally conveys understanding and agreement of the signs, symptoms, diagnosis, treatment and prognosis and additionally agrees to follow up as discussed. She also agrees with the care-plan and conveys that all of her questions have been answered. I have also provided discharge instructions for her that include: educational information regarding their diagnosis and treatment, and list of reasons why they would want to return to the ED prior to their follow-up appointment, should her condition change. Final Diagnosis     1. Ovarian cyst, right        Disposition     Current Discharge Medication List           Follow-up Information       Follow up With Specialties Details Why Contact Info    Lorena Anguiano MD Internal Medicine Physician Go in 1 week As needed [facility] [address] [facility] [address] [facility]      Ezequiel Dobbs MD Obstetrics & Gynecology, Gynecology, Obstetrics Go in 1 week As needed [address] [facility]  [id number] [place] [address] [phone]                 Nursing notes have been reviewed by the physician/ advanced practice    Clinician.     Dimas Gagnon PA-C  February 2, 2023

## 2023-02-03 DIAGNOSIS — Z12.39 BREAST SCREENING: Primary | ICD-10-CM

## 2023-02-05 DIAGNOSIS — R92.8 ABNORMAL MAMMOGRAM: Primary | ICD-10-CM

## 2023-02-23 ENCOUNTER — HOSPITAL ENCOUNTER (OUTPATIENT)
Facility: HOSPITAL | Age: 43
Discharge: HOME | End: 2023-02-23
Payer: COMMERCIAL

## 2023-02-23 ENCOUNTER — HOSPITAL ENCOUNTER (OUTPATIENT)
Facility: HOSPITAL | Age: 43
End: 2023-02-23
Payer: COMMERCIAL

## 2023-02-23 DIAGNOSIS — N64.59 ABNORMAL BREAST EXAM: ICD-10-CM

## 2023-02-23 PROCEDURE — G0279 TOMOSYNTHESIS, MAMMO: HCPCS

## 2023-02-23 PROCEDURE — 76642 ULTRASOUND BREAST LIMITED: CPT

## 2023-03-17 RX ORDER — VALSARTAN AND HYDROCHLOROTHIAZIDE 160; 12.5 MG/1; MG/1
TABLET, FILM COATED ORAL
Qty: 90 TABLET | Refills: 1 | Status: SHIPPED | OUTPATIENT
Start: 2023-03-17

## 2023-09-18 RX ORDER — VALSARTAN AND HYDROCHLOROTHIAZIDE 160; 12.5 MG/1; MG/1
TABLET, FILM COATED ORAL
Qty: 90 TABLET | Refills: 1 | OUTPATIENT
Start: 2023-09-18